# Patient Record
Sex: MALE | Race: WHITE | NOT HISPANIC OR LATINO | Employment: OTHER | URBAN - METROPOLITAN AREA
[De-identification: names, ages, dates, MRNs, and addresses within clinical notes are randomized per-mention and may not be internally consistent; named-entity substitution may affect disease eponyms.]

---

## 2017-01-31 ENCOUNTER — TRANSFERRED RECORDS (OUTPATIENT)
Dept: HEALTH INFORMATION MANAGEMENT | Facility: CLINIC | Age: 77
End: 2017-01-31

## 2017-02-01 ENCOUNTER — OFFICE VISIT (OUTPATIENT)
Dept: FAMILY MEDICINE | Facility: CLINIC | Age: 77
End: 2017-02-01
Payer: COMMERCIAL

## 2017-02-01 VITALS
BODY MASS INDEX: 23.43 KG/M2 | DIASTOLIC BLOOD PRESSURE: 74 MMHG | WEIGHT: 140.6 LBS | SYSTOLIC BLOOD PRESSURE: 130 MMHG | RESPIRATION RATE: 15 BRPM | HEIGHT: 65 IN | OXYGEN SATURATION: 97 % | HEART RATE: 65 BPM | TEMPERATURE: 97.5 F

## 2017-02-01 DIAGNOSIS — C44.310 BCC (BASAL CELL CARCINOMA), FACE: ICD-10-CM

## 2017-02-01 DIAGNOSIS — I10 HYPERTENSION GOAL BP (BLOOD PRESSURE) < 140/90: Primary | ICD-10-CM

## 2017-02-01 DIAGNOSIS — J43.9 PULMONARY EMPHYSEMA, UNSPECIFIED EMPHYSEMA TYPE (H): ICD-10-CM

## 2017-02-01 DIAGNOSIS — J42 CHRONIC BRONCHITIS, UNSPECIFIED CHRONIC BRONCHITIS TYPE (H): ICD-10-CM

## 2017-02-01 PROCEDURE — 99214 OFFICE O/P EST MOD 30 MIN: CPT | Performed by: INTERNAL MEDICINE

## 2017-02-01 NOTE — NURSING NOTE
"Chief Complaint   Patient presents with     COPD     insurance will no longer cover Advair Diskus   possbile alternative Dulera, Symbicort.       Initial /74 mmHg  Pulse 65  Temp(Src) 97.5  F (36.4  C) (Oral)  Resp 15  Ht 5' 5\" (1.651 m)  Wt 140 lb 9.6 oz (63.776 kg)  BMI 23.40 kg/m2  SpO2 97% Estimated body mass index is 23.4 kg/(m^2) as calculated from the following:    Height as of this encounter: 5' 5\" (1.651 m).    Weight as of this encounter: 140 lb 9.6 oz (63.776 kg).  BP completed using cuff size: regular    "

## 2017-02-01 NOTE — PROGRESS NOTES
"  SUBJECTIVE:                                                    Deepak Mckay is a 76 year old male who presents to clinic today for the following health issues:      HISTORY OF PRESENT ILLNESS:    COPD Follow-Up    Symptoms are currently: Stable    Current fatigue or dyspnea with ambulation: Stable       Shortness of breath: Stable    Increased or change in Cough/Sputum: No    Fever(s): No    Baseline ambulation without stopping to rest 1.5 miles. Able to walk up three flights of stairs without stopping to rest.    Any ER/UC or hospital admissions since your last visit? No     Concerns Today:             The patient presents to the clinic today to discuss alternatives to Advair because his insurance will no longer cover it. He is interested in either Dulera or Symbicort, whichever will be covered.    History   Smoking status     Former Smoker     Quit date: 01/01/1980   Smokeless tobacco     Never Used     Comment: Pipie smoker, quit 1980 max 1/2ppd cigarettes since 1955     FEV1        1.47   11/21/2013  [id number]       43   11/21/2013       Amount of exercise or physical activity: 4 days/week for an average of greater than 60 minutes    Problems taking medications regularly: No    Medication side effects: none    Diet: regular (no restrictions)    Problem list and histories reviewed & adjusted, as indicated.  Additional history: as documented    Labs reviewed in EPIC  Problem list, Medication list, Allergies, and Medical/Social/Surgical histories reviewed in [facility] and updated as appropriate.      REVIEW OF SYSTEMS:  C: NEGATIVE for fever, chills, or change in weight  I: Hx of Basal Cell Carcinoma - \"Blue Light Treatment\" at [facility]; POSITIVE for a seborrheic keratosis located on top of his head - follows with Dr. Regino Portillo at [facility]; NEGATIVE for worrisome rashes  R:  Chronic Obstructive Pulmonary Disease - use of Advair and Albuterol; no frequent exacerbations; NEGATIVE for " "significant cough or SOB  CV: Hypertension - use of Valsartan-Hydrochlorothiazide; NEGATIVE for chest pain, palpitations or peripheral edema  GI: POSITIVE for intermittent heartburn; NEGATIVE for nausea, abdominal pain, heartburn, or change in bowel habits  N: NEGATIVE for weakness, dizziness or paresthesias  P: NEGATIVE for changes in mood or affect    This document serves as a record of the services and decisions personally performed and made by Gaby Richmond MD. It was created on her behalf by Juliana Proctor, a trained medical scribe. The creation of this document is based the provider's statements to the medical scribe.  Juliana Proctor, February 1, 2017 11:45 AM     OBJECTIVE:                                                    /74 mmHg  Pulse 65  Temp(Src) 97.5  F (36.4  C) (Oral)  Resp 15  Ht 1.651 m (5' 5\")  Wt 63.776 kg (140 lb 9.6 oz)  BMI 23.40 kg/m2  SpO2 97%  Body mass index is 23.4 kg/(m^2).    EXAM:  GENERAL: Patient appears healthy, alert and not distressed.  EYES: Eyes appear grossly normal to inspection, with normal conjunctivae and sclerae  NECK: No adenopathy present, no asymmetry, masses, or scars noted, thyroid is normal to palpation  RESP: Lungs are clear to auscultation - no rales, rhonchi or wheezes present  CV: Regular rate and rhythm, normal S1 S2 heart sounds, no ectopy, no peripheral edema present, peripheral pulses normal, no carotid bruit.  ABDOMEN: Soft, nontender, no hepatosplenomegaly, no masses, normal bowel sounds  MS: No gross musculoskeletal defects noted, no edema, gait is age appropriate without ataxia  SKIN: No suspicious rashes or moles; Seborrheic keratosis noted on scalp.  NEURO: Patient exhibits normal strength and tone, normal speech and mentation  PSYCH: Mentation appears normal, affect is normal/bright    Diagnostic Test Results:  No results found for this or any previous visit (from the past 24 hour(s)).      ASSESSMENT/PLAN:                                 " "                   (C44.310) BCC (basal cell carcinoma), face    Comment: Shave biopsy performed and treated with \"Blue Light Treatment\" by Dr. Regino Portillo at [facility]; Next appointment with Dr. Portillo is 5/6/2017.   Plan: Follow-up appointment with Dr. Portillo on 5/6/2017.    (I10) Hypertension goal BP (blood pressure) < 140/90 (primary encounter diagnosis)  Comment: Stable and optimally controlled with Valsartan-Hydrochlorothiazide; Well-tolerated; No changes in medication or dosing.  Plan: Will continue to monitor.    (J42) Chronic bronchitis, unspecified chronic bronchitis type (H)  Comment: Insurance will no longer cover Advair; Formulary switch to Dulera; Symptoms are otherwise very well-controlled without the need for Albuterol rescue inhaler for over five months; Continues to keep Albuterol on hand just in case.   Plan: mometasone-formoterol (DULERA) 200-5 MCG/ACT oral inhaler            MEDICATIONS:   Orders Placed This Encounter   Medications     mometasone-formoterol (DULERA) 200-5 MCG/ACT oral inhaler     Sig: Inhale 2 puffs into the lungs 2 times daily     Dispense:  3 Inhaler     Refill:  3     Medications Discontinued During This Encounter   Medication Reason     fluticasone-salmeterol (ADVAIR) 250-50 MCG/DOSE diskus inhaler Cost/Formulary change          - Continue other medications without change      The information in this document, created by a medical scribe for me, accurately reflects the services I personally performed and the decisions made by me. I have reviewed and approved this document for accuracy.  Dr. Gaby Richmond, 11:54 AM, February 1, 2017    Gaby Richmond MD  Internal Medicine   [facility]  "

## 2017-02-01 NOTE — MR AVS SNAPSHOT
"              After Visit Summary   2017    Deepak Mckay    MRN: [id number]           Patient Information     Date Of Birth          1940        Visit Information        Provider Department      2017 11:00 AM Gaby Richmond MD [facility]        Today's Diagnoses     BCC (basal cell carcinoma), face    -  1     Hypertension goal BP (blood pressure) < 140/90         Chronic bronchitis, unspecified chronic bronchitis type (H)            Follow-ups after your visit        Who to contact     If you have questions or need follow up information about today's clinic visit or your schedule please contact [facility] directly at [phone].  Normal or non-critical lab and imaging results will be communicated to you by MyChart, letter or phone within 4 business days after the clinic has received the results. If you do not hear from us within 7 days, please contact the clinic through MyChart or phone. If you have a critical or abnormal lab result, we will notify you by phone as soon as possible.  Submit refill requests through [employer] or call your pharmacy and they will forward the refill request to us. Please allow 3 business days for your refill to be completed.          Additional Information About Your Visit        MyChart Information     [employer] lets you send messages to your doctor, view your test results, renew your prescriptions, schedule appointments and more. To sign up, go to [email or web address] . Click on \"Log in\" on the left side of the screen, which will take you to the Welcome page. Then click on \"Sign up Now\" on the right side of the page.     You will be asked to enter the access code listed below, as well as some personal information. Please follow the directions to create your username and password.     Your access code is: [email or web address]  Expires: 2017 11:53 AM     Your access code will  in 90 days. If you need help or a new code, please " "call your [facility] clinic or [phone].        Care EveryWhere ID     This is your Care EveryWhere ID. This could be used by other organizations to access your [facility] medical records  [id number]        Your Vitals Were     Pulse Temperature Respirations Height BMI (Body Mass Index) Pulse Oximetry    65 97.5  F (36.4  C) (Oral) 15 5' 5\" (1.651 m) 23.40 kg/m2 97%       Blood Pressure from Last 3 Encounters:   02/01/17 130/74   08/10/16 122/64   08/03/15 118/66    Weight from Last 3 Encounters:   02/01/17 140 lb 9.6 oz (63.776 kg)   08/10/16 144 lb 6.4 oz (65.499 kg)   08/03/15 143 lb 4.8 oz (65 kg)              Today, you had the following     No orders found for display         Today's Medication Changes          These changes are accurate as of: 2/1/17 11:53 AM.  If you have any questions, ask your nurse or doctor.               Start taking these medicines.        Dose/Directions    mometasone-formoterol 200-5 MCG/ACT oral inhaler   Commonly known as:  DULERA   Used for:  Chronic bronchitis, unspecified chronic bronchitis type (H)   Started by:  Gaby Richmond MD        Dose:  2 puff   Inhale 2 puffs into the lungs 2 times daily   Quantity:  3 Inhaler   Refills:  3         Stop taking these medicines if you haven't already. Please contact your care team if you have questions.     fluticasone-salmeterol 250-50 MCG/DOSE diskus inhaler   Commonly known as:  ADVAIR   Stopped by:  Gaby Richmond MD                Where to get your medicines      These medications were sent to [id number] [address] [address]  [address] [facility] [address]     Phone:  [phone]    - mometasone-formoterol 200-5 MCG/ACT oral inhaler             Primary Care Provider Office Phone # Fax #    Gaby Richmond [phone]       [facility] [address] [facility] [address]        Thank you!     Thank you for choosing [facility] " [facility]  for your care. Our goal is always to provide you with excellent care. Hearing back from our patients is one way we can continue to improve our services. Please take a few minutes to complete the written survey that you may receive in the mail after your visit with us. Thank you!             Your Updated Medication List - Protect others around you: Learn how to safely use, store and throw away your medicines at www.disposemymeds.org.          This list is accurate as of: 2/1/17 11:53 AM.  Always use your most recent med list.                   Brand Name Dispense Instructions for use    albuterol 108 (90 BASE) MCG/ACT Inhaler    albuterol    1 Inhaler    Inhale 2 puffs into the lungs every 4 hours as needed       mometasone-formoterol 200-5 MCG/ACT oral inhaler    DULERA    3 Inhaler    Inhale 2 puffs into the lungs 2 times daily       valsartan-hydrochlorothiazide 160-25 MG per tablet    DIOVAN HCT    90 tablet    Take 1 tablet by mouth daily Profile Rx: patient will contact pharmacy when needed       VITAMIN D (CHOLECALCIFEROL) PO      Take by mouth daily

## 2017-05-05 ENCOUNTER — TRANSFERRED RECORDS (OUTPATIENT)
Dept: HEALTH INFORMATION MANAGEMENT | Facility: CLINIC | Age: 77
End: 2017-05-05

## 2017-10-20 ENCOUNTER — TRANSFERRED RECORDS (OUTPATIENT)
Dept: HEALTH INFORMATION MANAGEMENT | Facility: CLINIC | Age: 77
End: 2017-10-20

## 2017-11-27 ENCOUNTER — OFFICE VISIT (OUTPATIENT)
Dept: FAMILY MEDICINE | Facility: CLINIC | Age: 77
End: 2017-11-27
Payer: COMMERCIAL

## 2017-11-27 VITALS
WEIGHT: 140.5 LBS | SYSTOLIC BLOOD PRESSURE: 116 MMHG | OXYGEN SATURATION: 97 % | RESPIRATION RATE: 17 BRPM | HEIGHT: 65 IN | BODY MASS INDEX: 23.41 KG/M2 | TEMPERATURE: 97.6 F | DIASTOLIC BLOOD PRESSURE: 78 MMHG | HEART RATE: 74 BPM

## 2017-11-27 DIAGNOSIS — I10 ESSENTIAL HYPERTENSION, BENIGN: ICD-10-CM

## 2017-11-27 DIAGNOSIS — J42 CHRONIC BRONCHITIS, UNSPECIFIED CHRONIC BRONCHITIS TYPE (H): ICD-10-CM

## 2017-11-27 DIAGNOSIS — Q35.9 CLEFT PALATE: ICD-10-CM

## 2017-11-27 DIAGNOSIS — Z92.89 HISTORY OF USE OF HEARING AID IN BOTH EARS: ICD-10-CM

## 2017-11-27 DIAGNOSIS — H61.23 BILATERAL IMPACTED CERUMEN: ICD-10-CM

## 2017-11-27 DIAGNOSIS — Z00.00 ROUTINE GENERAL MEDICAL EXAMINATION AT A HEALTH CARE FACILITY: Primary | ICD-10-CM

## 2017-11-27 PROCEDURE — 80061 LIPID PANEL: CPT | Performed by: INTERNAL MEDICINE

## 2017-11-27 PROCEDURE — 80053 COMPREHEN METABOLIC PANEL: CPT | Performed by: INTERNAL MEDICINE

## 2017-11-27 PROCEDURE — 82043 UR ALBUMIN QUANTITATIVE: CPT | Performed by: INTERNAL MEDICINE

## 2017-11-27 PROCEDURE — 99207 C PAF COMPLETED  NO CHARGE: CPT | Performed by: INTERNAL MEDICINE

## 2017-11-27 PROCEDURE — 99397 PER PM REEVAL EST PAT 65+ YR: CPT | Performed by: INTERNAL MEDICINE

## 2017-11-27 PROCEDURE — 99213 OFFICE O/P EST LOW 20 MIN: CPT | Mod: 25 | Performed by: INTERNAL MEDICINE

## 2017-11-27 PROCEDURE — 36415 COLL VENOUS BLD VENIPUNCTURE: CPT | Performed by: INTERNAL MEDICINE

## 2017-11-27 RX ORDER — VALSARTAN AND HYDROCHLOROTHIAZIDE 160; 25 MG/1; MG/1
1 TABLET ORAL DAILY
Qty: 90 TABLET | Refills: 3 | Status: SHIPPED | OUTPATIENT
Start: 2017-11-27 | End: 2018-12-10

## 2017-11-27 RX ORDER — ALBUTEROL SULFATE 90 UG/1
2 AEROSOL, METERED RESPIRATORY (INHALATION) EVERY 4 HOURS PRN
Qty: 1 INHALER | Refills: 1 | Status: SHIPPED | OUTPATIENT
Start: 2017-11-27 | End: 2018-12-10

## 2017-11-27 NOTE — LETTER
My COPD Action Plan     Name: Deepak Mckay    YOB: 1940   Date: 11/27/2017    My doctor: Gaby Richmond MD   My clinic: [address] [address] [phone]  My Controller Medicine: Formoterol/mometasone (Dulera)   Dose: 200/5     My Rescue Medicine: Albuterol (Proair/Ventolin/Proventil) inhaler   Dose:      My Flare Up Medicine: none   Dose:  FEV-1 (no units)   Date Value   11/21/2013 1.47     FEV1/FVC (no units)   Date Value   11/21/2013 43      My COPD Severity:       Use of Oxygen: Oxygen Not Prescribed      Make sure you've had your pneumonia   vaccines.          GREEN ZONE       Doing well today      Usual level of activity and exercise    Usual amount of cough and mucus    No shortness of breath    Usual level of health (thinking clearly, sleeping well, feel like eating) Actions:      Take daily medicines    Use oxygen as prescribed    Follow regular exercise and diet plan    Avoid cigarette smoke and other irritants that harm the lungs           YELLOW ZONE          Having a bad day or flare up      Short of breath more than usual    A lot more sputum (mucus) than usual    Sputum looks yellow, green, tan, brown or bloody    More coughing or wheezing    Fever or chills    Less energy; trouble completing activities    Trouble thinking or focusing    Using quick relief inhaler or nebulizer more often    Poor sleep; symptoms wake me up    Do not feel like eating Actions:      Get plenty of rest    Take daily medicines    Use quick relief inhaler every 6 hours    If you use oxygen, call you doctor to see if you should adjust your oxygen    Do breathing exercises or other things to help you relax    Let a loved one, friend or neighbor know you are feeling worse    Call your care team if you have 2 or more symptoms.  Start taking steroids or antibiotics if directed by your care team           RED ZONE       Need medical care  now      Severe shortness of breath (feel you can't breathe)    Fever, chills    Not enough breath to do any activity    Trouble coughing up mucus, walking or talking    Blood in mucus    Frequent coughing   Rescue medicines are not working    Not able to sleep because of breathing    Feel confused or drowsy    Chest pain    Actions:      Call your health care team.  If you cannot reach your care team, call 911 or go to the emergency room.        Electronically signed by: Claudette Bright, November 27, 2017  Annual Reminders:  Meet with Care Team, Flu Shot every Fall  Pharmacy: [facility] [address] [place] [address] [facility]

## 2017-11-27 NOTE — PROGRESS NOTES
SUBJECTIVE:   Deepak Mckay is a 77 year old male who presents for Preventive Visit.  He is also due for assessment of Chronic Bronchitis ( COPD), Hypertension and decreased hearing    Are you in the first 12 months of your Medicare Part B coverage?  No    Healthy Habits:    Do you get at least three servings of calcium containing foods daily (dairy, green leafy vegetables, etc.)? yes    Amount of exercise or daily activities, outside of work: 7 day(s) per week  Walks 2 miles and lifts weights     Problems taking medications regularly No    Medication side effects: No    Have you had an eye exam in the past two years? yes    Do you see a dentist twice per year? yes    Do you have sleep apnea, excessive snoring or daytime drowsiness?no      COGNITIVE SCREEN  1) Repeat 3 items (Banana, Sunrise, Chair)    2) Clock draw: NORMAL  3) 3 item recall: Recalls 3 objects  Results: 3 items recalled: COGNITIVE IMPAIRMENT LESS LIKELY    Mini-CogTM Copyright S Gisela. Licensed by the author for use in [facility]; reprinted with permission (ghazala@[facility]). All rights reserved.      Reviewed and updated as needed this visit by clinical staffTobacco  Allergies  Meds  Med Hx  Surg Hx  Fam Hx  Soc Hx      Reviewed and updated as needed this visit by Provider        Social History   Substance Use Topics     Smoking status: Former Smoker     Quit date: 1/1/1980     Smokeless tobacco: Never Used      Comment: Pipie smoker, quit 1980 max 1/2ppd cigarettes since 1955     Alcohol use 1.0 oz/week     2 drink(s) per week     The patient does not drink >3 drinks per day nor >7 drinks per week.     Today's PHQ-2 Score:   PHQ-2 ( 1999 Pfizer) 11/27/2017 2/1/2017   Q1: Little interest or pleasure in doing things 0 0   Q2: Feeling down, depressed or hopeless 0 0   PHQ-2 Score 0 0     Do you feel safe in your environment - Yes    Do you have a Health Care Directive?: Yes: Advance Directive has been received and  scanned.    Current providers sharing in care for this patient include: Patient Care Team:  Gaby Richmond MD as PCP - General (Internal Medicine)      Hearing impairment: Yes, wears hearing aids    Ability to successfully perform activities of daily living: Yes, no assistance needed     Fall risk:  Fallen 2 or more times in the past year?: No  Any fall with injury in the past year?: No    Home safety:  none identified    The following health maintenance items are reviewed in Epic and correct as of today:  Health Maintenance   Topic Date Due     COPD ACTION PLAN Q1 YR  1940     FALL RISK ASSESSMENT  08/10/2017     ADVANCE DIRECTIVE PLANNING Q5 YRS  09/01/2020     LIPID SCREEN Q5 YR MALE (SYSTEM ASSIGNED)  08/10/2021     TETANUS IMMUNIZATION (SYSTEM ASSIGNED)  12/15/2021     INFLUENZA VACCINE (SYSTEM ASSIGNED)  Completed     SPIROMETRY ONETIME  Completed     PNEUMOCOCCAL  Completed     Labs reviewed in EPIC    ROS:  CONSTITUTIONAL: NEGATIVE for fever, chills, change in weight  INTEGUMENTARY/SKIN: NEGATIVE for worrisome rashes, moles or lesions  EYES: NEGATIVE for vision changes or irritation  ENT/MOUTH: POSITIVE for increased hearing loss, use of bilateral hearing aids, follows with [facility] in [place]; Cleft palate; NEGATIVE for mouth and throat problems  RESP: Hx of pulmonary emphysema and COPD - use of albuterol inhaler and dulera; NEGATIVE for significant cough or SOB  CV: Hypertension - use of valsartan-HCTZ; NEGATIVE for chest pain, palpitations or peripheral edema  GI: NEGATIVE for nausea, abdominal pain, heartburn, or change in bowel habits  : Hx of BPH; negative for dysuria, hematuria, erectile dysfunction  MUSCULOSKELETAL: NEGATIVE for significant arthralgias or myalgia  NEURO: NEGATIVE for weakness, dizziness or paresthesias  ENDOCRINE: Hx of hyperlipidemia; NEGATIVE for temperature intolerance, skin/hair changes  HEME/ALLERGY/IMMUNE: NEGATIVE for bleeding problems  PSYCHIATRIC:  "NEGATIVE for changes in mood or affect    This document serves as a record of the services and decisions personally performed and made by Gaby Richmond MD. It was created on her behalf by Anitra Jeffers, a trained medical scribe. The creation of this document is based on the provider's statements to the medical scribe.   Anitra Jeffers, 10:36 AM, November 27, 2017    OBJECTIVE:   /78  Pulse 74  Temp 97.6  F (36.4  C) (Oral)  Resp 17  Ht 5' 5\" (1.651 m)  Wt 140 lb 8 oz (63.7 kg)  SpO2 97%  BMI 23.38 kg/m2 Estimated body mass index is 23.38 kg/(m^2) as calculated from the following:    Height as of this encounter: 5' 5\" (1.651 m).    Weight as of this encounter: 140 lb 8 oz (63.7 kg).     EXAM:   GENERAL: healthy, alert and no distress  EYES: Eyes grossly normal to inspection and PERRL  HENT: Mild cerumen, R>L; normal cephalic/atraumatic, ear canals and TM's normal, nose and mouth without ulcers or lesions, oropharynx clear and oral mucous membranes moist  NECK: no adenopathy, no asymmetry, masses, or scars, thyroid normal to palpation and no carotid bruits  RESP: Prolonged expiratory phase; lungs clear to auscultation - no rales, rhonchi or wheezes  CV: regular rates and rhythm, normal S1 S2, no murmur, peripheral pulses strong and no peripheral edema  ABDOMEN: soft, nontender, without hepatosplenomegaly or masses and bowel sounds normal  MS: extremities normal- no gross deformities noted  SKIN: no suspicious lesions or rashes  NEURO: Normal strength and tone, sensory exam grossly normal and mentation intact  PSYCH: mentation appears normal and affect normal/bright    ASSESSMENT / PLAN:     (Z00.00) Routine general medical examination at a health care facility  (primary encounter diagnosis)  Comment: HEALTH CARE MAINTENANCE and immunizations reviewed and up to date; Fasting for labs today  Plan: Annual preventative visit     (I10) Essential hypertension, benign  Comment: BP reviewed and " well-controlled with valsartan-HCTZ; /78 today; no change in medications/dosage at this time; will continue to monitor   Plan: valsartan-hydrochlorothiazide (DIOVAN HCT)         160-25 MG per tablet, Comprehensive metabolic         panel, Albumin Random Urine Quantitative with         Creat Ratio    (J42) Chronic bronchitis, unspecified chronic bronchitis type (H)  Comment: Formulary switch to Dulera; has Albuterol available but not needed in over 5 months; Pt is using dulera once per day and has not used albuterol since his last appt; pt reports that his COPD is gradually becoming a little more difficult; he is recommended to use dulera twice per day during the winter if things worsen; pt walks daily outside and during the winter he goes to [employer]  Plan: albuterol (PROAIR HFA) 108 (90 BASE) MCG/ACT         Inhaler, mometasone-formoterol (DULERA) 200-5         MCG/ACT oral inhaler, Lipid panel reflex to         direct LDL Fasting, COPD ACTION PLAN    (Q35.9) Cleft palate  Comment: noted  Plan: no ongoing issues     (Z92.89) History of use of hearing aid in both ears  Comment: follows with [facility]; bilateral hearing aids since ~ 2010  Plan: continue to follow with [facility]    (H61.23) Bilateral impacted cerumen  Comment: recommend Debrox in ears to soften and could return for removal or have removed when hearing aids checked next week; Mild cerumen in bilateral ears, R>L; pt notes increased hearing loss; use of bilateral hearing aids; follows with [facility] in [place]; has an appt scheduled for his hearing loss  Plan: carbamide peroxide (DEBROX) 6.5 % otic solution to help clear cerumen; could then irrigate himself at home or return for nursing staff to assist clear ears; another option is to see hearing aid folks and upconing appt      End of Life Planning:  Patient currently has an advanced directive: Yes.  Practitioner is supportive of decision.    COUNSELING:  Reviewed preventive health  "counseling, as reflected in patient instructions  Special attention given to:       Regular exercise       Healthy diet/nutrition    Estimated body mass index is 23.38 kg/(m^2) as calculated from the following:    Height as of this encounter: 5' 5\" (1.651 m).    Weight as of this encounter: 140 lb 8 oz (63.7 kg).     reports that he quit smoking about 37 years ago. He has never used smokeless tobacco.    Appropriate preventive services were discussed with this patient, including applicable screening as appropriate for cardiovascular disease, diabetes, osteopenia/osteoporosis, and glaucoma.  As appropriate for age/gender, discussed screening for colorectal cancer, prostate cancer, breast cancer, and cervical cancer. Checklist reviewing preventive services available has been given to the patient.    Reviewed patients plan of care and provided an AVS. The Basic Care Plan (routine screening as documented in Health Maintenance) for Deepak meets the Care Plan requirement. This Care Plan has been established and reviewed with the Patient.    Counseling Resources:  ATP IV Guidelines  Pooled Cohorts Equation Calculator  Breast Cancer Risk Calculator  FRAX Risk Assessment  ICSI Preventive Guidelines  Dietary Guidelines for Americans, 2010  USDA's MyPlate  ASA Prophylaxis  Lung CA Screening    Gaby Richmond MD  Internal Medicine  [facility]  15 minutes in addition to HEALTH CARE MAINTENANCE are spent with patient, over 50% of that time spent providing counselling, discussing and reviewing medical conditions/concerns, meds and potential side effects.      The information in this document, created by a medical scribe for me, accurately reflects the services I personally performed and the decisions made by me. I have reviewed and approved this document for accuracy.  Dr. Gaby Richmond, 10:55 AM, November 27, 2017    "

## 2017-11-27 NOTE — MR AVS SNAPSHOT
After Visit Summary   11/27/2017    Deepak Mckay    MRN: [id number]           Patient Information     Date Of Birth          1940        Visit Information        Provider Department      11/27/2017 10:00 AM Gaby Richmond MD [facility]        Today's Diagnoses     Routine general medical examination at a health care facility    -  1    Essential hypertension, benign        Chronic bronchitis, unspecified chronic bronchitis type (H)        Cleft palate        History of use of hearing aid in both ears        Bilateral impacted cerumen          Care Instructions      Preventive Health Recommendations:       Male Ages 65 and over    Yearly exam:             See your health care provider every year in order to  o   Review health changes.   o   Discuss preventive care.    o   Review your medicines if your doctor has prescribed any.    Talk with your health care provider about whether you should have a test to screen for prostate cancer (PSA).    Every 3 years, have a diabetes test (fasting glucose). If you are at risk for diabetes, you should have this test more often.    Every 5 years, have a cholesterol test. Have this test more often if you are at risk for high cholesterol or heart disease.     Every 10 years, have a colonoscopy. Or, have a yearly FIT test (stool test). These exams will check for colon cancer.    Talk to with your health care provider about screening for Abdominal Aortic Aneurysm if you have a family history of AAA or have a history of smoking.  Shots:     Get a flu shot each year.     Get a tetanus shot every 10 years.     Talk to your doctor about your pneumonia vaccines. There are now two you should receive - Pneumovax (PPSV 23) and Prevnar (PCV 13).    Talk to your doctor about a shingles vaccine.     Talk to your doctor about the hepatitis B vaccine.  Nutrition:     Eat at least 5 servings of fruits and vegetables each day.     Eat whole-grain bread,  "whole-wheat pasta and brown rice instead of white grains and rice.     Talk to your doctor about Calcium and Vitamin D.   Lifestyle    Exercise for at least 150 minutes a week (30 minutes a day, 5 days a week). This will help you control your weight and prevent disease.     Limit alcohol to one drink per day.     No smoking.     Wear sunscreen to prevent skin cancer.     See your dentist every six months for an exam and cleaning.     See your eye doctor every 1 to 2 years to screen for conditions such as glaucoma, macular degeneration and cataracts.          Follow-ups after your visit        Who to contact     If you have questions or need follow up information about today's clinic visit or your schedule please contact [facility] directly at [phone].  Normal or non-critical lab and imaging results will be communicated to you by [employer], letter or phone within 4 business days after the clinic has received the results. If you do not hear from us within 7 days, please contact the clinic through [employer] or phone. If you have a critical or abnormal lab result, we will notify you by phone as soon as possible.  Submit refill requests through [email or web address] or call your pharmacy and they will forward the refill request to us. Please allow 3 business days for your refill to be completed.          Additional Information About Your Visit        [email or web address] Information     [email or web address] lets you send messages to your doctor, view your test results, renew your prescriptions, schedule appointments and more. To sign up, go to [email or web address] . Click on \"Log in\" on the left side of the screen, which will take you to the Welcome page. Then click on \"Sign up Now\" on the right side of the page.     You will be asked to enter the access code listed below, as well as some personal information. Please follow the directions to create your username and password.     Your access code is: [email or web address]  Expires: 2/25/2018 10:50 " "AM     Your access code will  in 90 days. If you need help or a new code, please call your [facility] clinic or [phone].        Care EveryWhere ID     This is your Care EveryWhere ID. This could be used by other organizations to access your [facility] medical records  [id number]        Your Vitals Were     Pulse Temperature Respirations Height Pulse Oximetry BMI (Body Mass Index)    74 97.6  F (36.4  C) (Oral) 17 5' 5\" (1.651 m) 97% 23.38 kg/m2       Blood Pressure from Last 3 Encounters:   17 116/78   17 130/74   08/10/16 122/64    Weight from Last 3 Encounters:   17 140 lb 8 oz (63.7 kg)   17 140 lb 9.6 oz (63.8 kg)   08/10/16 144 lb 6.4 oz (65.5 kg)              We Performed the Following     Albumin Random Urine Quantitative with Creat Ratio     Comprehensive metabolic panel     COPD ACTION PLAN     Lipid panel reflex to direct LDL Fasting          Today's Medication Changes          These changes are accurate as of: 17 10:50 AM.  If you have any questions, ask your nurse or doctor.               Start taking these medicines.        Dose/Directions    carbamide peroxide 6.5 % otic solution   Commonly known as:  DEBROX   Used for:  Bilateral impacted cerumen   Started by:  Gaby Richmond MD        Dose:  5-10 drop   Place 5-10 drops into the right ear 2 times daily   Quantity:  30 mL   Refills:  0            Where to get your medicines      These medications were sent to [facility] [address] [place] [address] [facility]  [address] [address] [facility] [address]     Phone:  [phone]     albuterol 108 (90 BASE) MCG/ACT Inhaler    mometasone-formoterol 200-5 MCG/ACT oral inhaler    valsartan-hydrochlorothiazide 160-25 MG per tablet         Some of these will need a paper prescription and others can be bought over the counter.  Ask your nurse if you have questions.     You don't need a prescription for these medications     carbamide peroxide 6.5 % " otic solution                Primary Care Provider Office Phone # Fax #    Gaby Richmond [phone]       [address] [address] [facility] [address]        Equal Access to Services     DANIEL GOLD : Hadii aad ku hadlovelyo Soomaali, waaxda luqadaha, qaybta kaalmada adeegyada, zoey gibbonsn aviva vizcarra laLeroyclaudine huber. So [facility] [phone].    ATENCIÓN: Si habla español, tiene a harris disposición servicios gratuitos de asistencia lingüística. Llame al [phone].    We comply with applicable federal civil rights laws and [place] laws. We do not discriminate on the basis of race, color, national origin, age, disability, sex, sexual orientation, or gender identity.            Thank you!     Thank you for choosing [facility]  for your care. Our goal is always to provide you with excellent care. Hearing back from our patients is one way we can continue to improve our services. Please take a few minutes to complete the written survey that you may receive in the mail after your visit with us. Thank you!             Your Updated Medication List - Protect others around you: Learn how to safely use, store and throw away your medicines at www.disposemymeds.org.          This list is accurate as of: 11/27/17 10:50 AM.  Always use your most recent med list.                   Brand Name Dispense Instructions for use Diagnosis    albuterol 108 (90 BASE) MCG/ACT Inhaler    PROAIR HFA    1 Inhaler    Inhale 2 puffs into the lungs every 4 hours as needed    Chronic bronchitis, unspecified chronic bronchitis type (H)       carbamide peroxide 6.5 % otic solution    DEBROX    30 mL    Place 5-10 drops into the right ear 2 times daily    Bilateral impacted cerumen       mometasone-formoterol 200-5 MCG/ACT oral inhaler    DULERA    3 Inhaler    Inhale 2 puffs into the lungs 2 times daily    Chronic bronchitis, unspecified chronic bronchitis type (H)       valsartan-hydrochlorothiazide 160-25 MG per  tablet    DIOVAN HCT    90 tablet    Take 1 tablet by mouth daily Profile Rx: patient will contact pharmacy when needed    Essential hypertension, benign       VITAMIN D (CHOLECALCIFEROL) PO      Take by mouth daily

## 2017-11-27 NOTE — NURSING NOTE
"Chief Complaint   Patient presents with     Physical     pt fasting        Initial /78  Pulse 74  Temp 97.6  F (36.4  C) (Oral)  Resp 17  Ht 5' 5\" (1.651 m)  Wt 140 lb 8 oz (63.7 kg)  SpO2 97%  BMI 23.38 kg/m2 Estimated body mass index is 23.38 kg/(m^2) as calculated from the following:    Height as of this encounter: 5' 5\" (1.651 m).    Weight as of this encounter: 140 lb 8 oz (63.7 kg).  Medication Reconciliation: complete    "

## 2017-11-28 LAB
ALBUMIN SERPL-MCNC: 3.8 G/DL (ref 3.4–5)
ALP SERPL-CCNC: 60 U/L (ref 40–150)
ALT SERPL W P-5'-P-CCNC: 17 U/L (ref 0–70)
ANION GAP SERPL CALCULATED.3IONS-SCNC: 9 MMOL/L (ref 3–14)
AST SERPL W P-5'-P-CCNC: 17 U/L (ref 0–45)
BILIRUB SERPL-MCNC: 0.6 MG/DL (ref 0.2–1.3)
BUN SERPL-MCNC: 16 MG/DL (ref 7–30)
CALCIUM SERPL-MCNC: 8.9 MG/DL (ref 8.5–10.1)
CHLORIDE SERPL-SCNC: 102 MMOL/L (ref 94–109)
CHOLEST SERPL-MCNC: 192 MG/DL
CO2 SERPL-SCNC: 28 MMOL/L (ref 20–32)
CREAT SERPL-MCNC: 0.79 MG/DL (ref 0.66–1.25)
CREAT UR-MCNC: 59 MG/DL
GFR SERPL CREATININE-BSD FRML MDRD: >90 ML/MIN/1.7M2
GLUCOSE SERPL-MCNC: 110 MG/DL (ref 70–99)
HDLC SERPL-MCNC: 75 MG/DL
LDLC SERPL CALC-MCNC: 101 MG/DL
MICROALBUMIN UR-MCNC: 7 MG/L
MICROALBUMIN/CREAT UR: 12.09 MG/G CR (ref 0–17)
NONHDLC SERPL-MCNC: 117 MG/DL
POTASSIUM SERPL-SCNC: 3.7 MMOL/L (ref 3.4–5.3)
PROT SERPL-MCNC: 7.3 G/DL (ref 6.8–8.8)
SODIUM SERPL-SCNC: 139 MMOL/L (ref 133–144)
TRIGL SERPL-MCNC: 82 MG/DL

## 2017-12-04 ENCOUNTER — TRANSFERRED RECORDS (OUTPATIENT)
Dept: HEALTH INFORMATION MANAGEMENT | Facility: CLINIC | Age: 77
End: 2017-12-04

## 2018-12-10 ENCOUNTER — OFFICE VISIT (OUTPATIENT)
Dept: FAMILY MEDICINE | Facility: CLINIC | Age: 78
End: 2018-12-10
Payer: COMMERCIAL

## 2018-12-10 VITALS
SYSTOLIC BLOOD PRESSURE: 131 MMHG | DIASTOLIC BLOOD PRESSURE: 81 MMHG | HEIGHT: 65 IN | BODY MASS INDEX: 22.78 KG/M2 | HEART RATE: 72 BPM | RESPIRATION RATE: 12 BRPM | WEIGHT: 136.7 LBS | TEMPERATURE: 97 F | OXYGEN SATURATION: 97 %

## 2018-12-10 DIAGNOSIS — Z00.00 ENCOUNTER FOR ROUTINE ADULT HEALTH EXAMINATION WITHOUT ABNORMAL FINDINGS: Primary | ICD-10-CM

## 2018-12-10 DIAGNOSIS — J42 CHRONIC BRONCHITIS, UNSPECIFIED CHRONIC BRONCHITIS TYPE (H): ICD-10-CM

## 2018-12-10 DIAGNOSIS — E78.5 HYPERLIPIDEMIA LDL GOAL <130: ICD-10-CM

## 2018-12-10 DIAGNOSIS — I10 HYPERTENSION GOAL BP (BLOOD PRESSURE) < 140/90: ICD-10-CM

## 2018-12-10 LAB
ALBUMIN SERPL-MCNC: 4.2 G/DL (ref 3.4–5)
ALP SERPL-CCNC: 58 U/L (ref 40–150)
ALT SERPL W P-5'-P-CCNC: 20 U/L (ref 0–70)
ANION GAP SERPL CALCULATED.3IONS-SCNC: 10 MMOL/L (ref 3–14)
AST SERPL W P-5'-P-CCNC: 20 U/L (ref 0–45)
BILIRUB SERPL-MCNC: 0.7 MG/DL (ref 0.2–1.3)
BUN SERPL-MCNC: 18 MG/DL (ref 7–30)
CALCIUM SERPL-MCNC: 9.6 MG/DL (ref 8.5–10.1)
CHLORIDE SERPL-SCNC: 99 MMOL/L (ref 94–109)
CHOLEST SERPL-MCNC: 197 MG/DL
CO2 SERPL-SCNC: 26 MMOL/L (ref 20–32)
CREAT SERPL-MCNC: 0.75 MG/DL (ref 0.66–1.25)
CREAT UR-MCNC: 53 MG/DL
GFR SERPL CREATININE-BSD FRML MDRD: >90 ML/MIN/1.7M2
GLUCOSE SERPL-MCNC: 116 MG/DL (ref 70–99)
HDLC SERPL-MCNC: 79 MG/DL
LDLC SERPL CALC-MCNC: 107 MG/DL
MICROALBUMIN UR-MCNC: 6 MG/L
MICROALBUMIN/CREAT UR: 11.52 MG/G CR (ref 0–17)
NONHDLC SERPL-MCNC: 118 MG/DL
POTASSIUM SERPL-SCNC: 3.4 MMOL/L (ref 3.4–5.3)
PROT SERPL-MCNC: 7.7 G/DL (ref 6.8–8.8)
SODIUM SERPL-SCNC: 135 MMOL/L (ref 133–144)
TRIGL SERPL-MCNC: 56 MG/DL

## 2018-12-10 PROCEDURE — 80053 COMPREHEN METABOLIC PANEL: CPT | Performed by: PHYSICIAN ASSISTANT

## 2018-12-10 PROCEDURE — 99213 OFFICE O/P EST LOW 20 MIN: CPT | Mod: 25 | Performed by: PHYSICIAN ASSISTANT

## 2018-12-10 PROCEDURE — G0439 PPPS, SUBSEQ VISIT: HCPCS | Performed by: PHYSICIAN ASSISTANT

## 2018-12-10 PROCEDURE — 80061 LIPID PANEL: CPT | Performed by: PHYSICIAN ASSISTANT

## 2018-12-10 PROCEDURE — 36415 COLL VENOUS BLD VENIPUNCTURE: CPT | Performed by: PHYSICIAN ASSISTANT

## 2018-12-10 PROCEDURE — 82043 UR ALBUMIN QUANTITATIVE: CPT | Performed by: PHYSICIAN ASSISTANT

## 2018-12-10 RX ORDER — VALSARTAN AND HYDROCHLOROTHIAZIDE 160; 25 MG/1; MG/1
1 TABLET ORAL DAILY
Qty: 90 TABLET | Refills: 3 | Status: SHIPPED | OUTPATIENT
Start: 2018-12-10 | End: 2019-12-16

## 2018-12-10 RX ORDER — ALBUTEROL SULFATE 90 UG/1
2 AEROSOL, METERED RESPIRATORY (INHALATION) EVERY 4 HOURS PRN
Qty: 1 INHALER | Refills: 1 | Status: SHIPPED | OUTPATIENT
Start: 2018-12-10 | End: 2021-01-20

## 2018-12-10 ASSESSMENT — ENCOUNTER SYMPTOMS
CHILLS: 0
PALPITATIONS: 0
DIZZINESS: 0
FREQUENCY: 0
NAUSEA: 0
FEVER: 0
COUGH: 1
HEMATOCHEZIA: 0
ABDOMINAL PAIN: 0
DIARRHEA: 0
SORE THROAT: 0
CONSTIPATION: 0
JOINT SWELLING: 0
NERVOUS/ANXIOUS: 0
HEARTBURN: 0
EYE PAIN: 0
HEADACHES: 0
HEMATURIA: 0

## 2018-12-10 ASSESSMENT — ACTIVITIES OF DAILY LIVING (ADL): CURRENT_FUNCTION: NO ASSISTANCE NEEDED

## 2018-12-10 ASSESSMENT — MIFFLIN-ST. JEOR: SCORE: 1249.01

## 2018-12-10 NOTE — LETTER
My COPD Action Plan     Name: Deepak Mckay    YOB: 1940   Date: 12/10/2018    My doctor: Davis Garcia PA-C   My clinic: [address] [address] [phone]  My Controller Medicine: Formoterol/mometasone (Dulera)   Dose: 2 puffs bid     My Rescue Medicine: Albuterol (Proair/Ventolin/Proventil) inhaler   Dose: as needed     My Flare Up Medicine: none   Dose: none   FEV-1 (no units)   Date Value   11/21/2013 1.47     FEV1/FVC (no units)   Date Value   11/21/2013 43      My COPD Severity:       Use of Oxygen: Oxygen Not Prescribed      Make sure you've had your pneumonia   vaccines.          GREEN ZONE       Doing well today      Usual level of activity and exercise    Usual amount of cough and mucus    No shortness of breath    Usual level of health (thinking clearly, sleeping well, feel like eating) Actions:      Take daily medicines    Use oxygen as prescribed    Follow regular exercise and diet plan    Avoid cigarette smoke and other irritants that harm the lungs           YELLOW ZONE          Having a bad day or flare up      Short of breath more than usual    A lot more sputum (mucus) than usual    Sputum looks yellow, green, tan, brown or bloody    More coughing or wheezing    Fever or chills    Less energy; trouble completing activities    Trouble thinking or focusing    Using quick relief inhaler or nebulizer more often    Poor sleep; symptoms wake me up    Do not feel like eating Actions:      Get plenty of rest    Take daily medicines    Use quick relief inhaler every 4 hours    If you use oxygen, call you doctor to see if you should adjust your oxygen    Do breathing exercises or other things to help you relax    Let a loved one, friend or neighbor know you are feeling worse    Call your care team if you have 2 or more symptoms.  Start taking steroids or antibiotics if directed by your care team           RED ZONE       Need  medical care now      Severe shortness of breath (feel you can't breathe)    Fever, chills    Not enough breath to do any activity    Trouble coughing up mucus, walking or talking    Blood in mucus    Frequent coughing   Rescue medicines are not working    Not able to sleep because of breathing    Feel confused or drowsy    Chest pain    Actions:      Call your health care team.  If you cannot reach your care team, call 911 or go to the emergency room.        Annual Reminders:  Meet with Care Team, Flu Shot every Fall  [facility] [address] [id number] [address]

## 2018-12-10 NOTE — PATIENT INSTRUCTIONS
Preventive Health Recommendations:     See your health care provider every year to    Review health changes.     Discuss preventive care.      Review your medicines if your doctor has prescribed any.    Talk with your health care provider about whether you should have a test to screen for prostate cancer (PSA).    Every 3 years, have a diabetes test (fasting glucose). If you are at risk for diabetes, you should have this test more often.    Every 5 years, have a cholesterol test. Have this test more often if you are at risk for high cholesterol or heart disease.     Every 10 years, have a colonoscopy. Or, have a yearly FIT test (stool test). These exams will check for colon cancer.    Talk to with your health care provider about screening for Abdominal Aortic Aneurysm if you have a family history of AAA or have a history of smoking.  Shots:     Get a flu shot each year.     Get a tetanus shot every 10 years.     Talk to your doctor about your pneumonia vaccines. There are now two you should receive - Pneumovax (PPSV 23) and Prevnar (PCV 13).    Talk to your pharmacist about a shingles vaccine.     Talk to your doctor about the hepatitis B vaccine.  Nutrition:     Eat at least 5 servings of fruits and vegetables each day.     Eat whole-grain bread, whole-wheat pasta and brown rice instead of white grains and rice.     Get adequate Calcium and Vitamin D.   Lifestyle    Exercise for at least 150 minutes a week (30 minutes a day, 5 days a week). This will help you control your weight and prevent disease.     Limit alcohol to one drink per day.     No smoking.     Wear sunscreen to prevent skin cancer.     See your dentist every six months for an exam and cleaning.     See your eye doctor every 1 to 2 years to screen for conditions such as glaucoma, macular degeneration and cataracts.    Personalized Prevention Plan  You are due for the preventive services outlined below.  Your care team is available to assist you in  scheduling these services.  If you have already completed any of these items, please share that information with your care team to update in your medical record.    Health Maintenance Due   Topic Date Due     Zoster (Chicken Pox) Vaccine (1 of 2) 08/26/1988     Flu Vaccine (1) 09/01/2018     COPD ACTION PLAN Q1 YR  11/27/2018     FALL RISK ASSESSMENT  11/27/2018

## 2018-12-10 NOTE — PROGRESS NOTES
"SUBJECTIVE:   Deepak Mckay is a 78 year old male who presents for Preventive Visit.  Are you in the first 12 months of your Medicare coverage?  No    Annual Wellness Visit     In general, how would you rate your overall health?  Good    Frequency of exercise:  4-5 days/week    Do you usually eat at least 4 servings of fruit and vegetables a day, include whole grains    & fiber and avoid regularly eating high fat or \"junk\" foods?  Yes    Taking medications regularly:  Yes    Medication side effects:  None    Ability to successfully perform activities of daily living:  No assistance needed    Home Safety:  No safety concerns identified    Hearing Impairment:  Difficulty following a conversation in a noisy restaurant or crowded room, feel that people are mumbling or not speaking clearly, difficulty following dialogue in the theater, difficult to understand a speaker at a public meeting or [employer] service, need to ask people to speak up or repeat themselves, find that men's voices are easier to understand than woman's, difficulty understanding soft or whispered speech and difficulty understanding speech on the telephone    In the past 6 months, have you been bothered by leaking of urine?  No    In general, how would you rate your overall mental or emotional health?  Excellent    PHQ-2 Total Score: 0    Additional concerns today:  No    Patient is an 79yo male presenting for annual physical  Overall he feels well; using dulera once daily; rarely needing rescue  He continues to have adequate control of BP; tolerating medications      Do you feel safe in your environment? Yes    Do you have a Health Care Directive? Yes: Advance Directive has been received and scanned.    Fall risk  Fallen 2 or more times in the past year?: No  Any fall with injury in the past year?: No  Cognitive Screening    1) Repeat 3 items (Leader, Season, Table)    2) Clock draw: NORMAL  3) 3 item recall: Recalls 3 objects  Results: 3 items " recalled: COGNITIVE IMPAIRMENT LESS LIKELY    Mini-CogTM Copyright S Gisela. Licensed by the author for use in [facility]; reprinted with permission (ghazala@.[facility]). All rights reserved.      Do you have sleep apnea, excessive snoring or daytime drowsiness?: no    Reviewed and updated as needed this visit by clinical staff  Tobacco  Allergies  Meds  Med Hx  Surg Hx  Fam Hx  Soc Hx        Reviewed and updated as needed this visit by Provider        Social History     Tobacco Use     Smoking status: Former Smoker     Last attempt to quit: 1980     Years since quittin.9     Smokeless tobacco: Never Used     Tobacco comment: Pipie smoker, quit  max 1/2ppd cigarettes since    Substance Use Topics     Alcohol use: Yes     Alcohol/week: 1.0 oz     Types: 2 drink(s) per week       Alcohol Use 12/10/2018   If you drink alcohol do you typically have greater than 3 drinks per day OR greater than 7 drinks per week? No       Current providers sharing in care for this patient include:   Patient Care Team:  Gaby Richmond MD as PCP - General (Internal Medicine)    The following health maintenance items are reviewed in Epic and correct as of today:  Health Maintenance   Topic Date Due     ZOSTER IMMUNIZATION (1 of 2) 1988     INFLUENZA VACCINE (1) 2018     COPD ACTION PLAN Q1 YR  2018     FALL RISK ASSESSMENT  2018     PHQ-2 Q1 YR  12/10/2019     ADVANCE DIRECTIVE PLANNING Q5 YRS  2020     DTAP/TDAP/TD IMMUNIZATION (4 - Td) 12/15/2021     LIPID SCREEN Q5 YR MALE (SYSTEM ASSIGNED)  2022     SPIROMETRY ONETIME  Completed     PNEUMOVAX IMMUNIZATION 65+ HIGH/HIGHEST RISK  Completed     IPV IMMUNIZATION  Aged Out     MENINGITIS IMMUNIZATION  Aged Out     Labs reviewed in EPIC  Pneumonia Vaccine: Up to date  Shingrix: planning to get    Review of Systems   Constitutional: Negative for chills and fever.   HENT: Positive for congestion and hearing loss (he  "continues with outside audiology; using hearing aids). Negative for ear pain and sore throat.    Eyes: Negative for pain.   Respiratory: Positive for cough (attributes this to chronic lung disease).    Cardiovascular: Negative for chest pain and palpitations.   Gastrointestinal: Negative for abdominal pain, constipation, diarrhea, heartburn, hematochezia and nausea.   Genitourinary: Negative for frequency, genital sores and hematuria.   Musculoskeletal: Negative for joint swelling.   Skin: Negative for rash.   Neurological: Negative for dizziness and headaches.   Psychiatric/Behavioral: Negative for mood changes. The patient is not nervous/anxious.        OBJECTIVE:   /81   Pulse 72   Temp 97  F (36.1  C) (Tympanic)   Resp 12   Ht 1.638 m (5' 4.5\")   Wt 62 kg (136 lb 11.2 oz)   SpO2 97%   BMI 23.10 kg/m   Estimated body mass index is 23.1 kg/m  as calculated from the following:    Height as of this encounter: 1.638 m (5' 4.5\").    Weight as of this encounter: 62 kg (136 lb 11.2 oz).  Physical Exam  GENERAL: healthy, alert and no distress  EYES: Eyes grossly normal to inspection, PERRL and conjunctivae and sclerae normal  HENT: ear canals and TM's normal, nose and mouth without ulcers or lesions  NECK: no adenopathy, no asymmetry, masses, or scars and thyroid normal to palpation  RESP: lungs clear to auscultation - no rales, rhonchi or wheezes  CV: regular rate and rhythm, normal S1 S2, no S3 or S4, no murmur, click or rub, no peripheral edema and peripheral pulses strong  ABDOMEN: soft, nontender, no hepatosplenomegaly, no masses and bowel sounds normal  MS: no gross musculoskeletal defects noted, no edema  SKIN: numerous seb k and actinic k to the scalp. Sees dermatology  PSYCH: mentation appears normal, affect normal/bright    Diagnostic Test Results:  See A/P    ASSESSMENT / PLAN:   1. Encounter for routine adult health examination without abnormal findings  Updated COPD plan. He has rec'd flu " "vaccine. Will update shingles today (was on wait list at [facility], will go to our pharmacy).    2. Hypertension goal BP (blood pressure) < 140/90  Well controlled. Continue present management.   - Comprehensive metabolic panel  - Albumin Random Urine Quantitative with Creat Ratio  - valsartan-hydrochlorothiazide (DIOVAN HCT) 160-25 MG tablet; Take 1 tablet by mouth daily Profile Rx: patient will contact pharmacy when needed  Dispense: 90 tablet; Refill: 3      3. Hyperlipidemia LDL goal <130  Rechecking. This has been managed with diet/exercise  - Lipid panel reflex to direct LDL Fasting    4. Chronic bronchitis, unspecified chronic bronchitis type (H)  Controlled. He often uses dulera even just 2 puffs once daily. Reviewed usage  - mometasone-formoterol (DULERA) 200-5 MCG/ACT inhaler; Inhale 2 puffs into the lungs 2 times daily  Dispense: 3 Inhaler; Refill: 3  - albuterol (PROAIR HFA) 108 (90 Base) MCG/ACT inhaler; Inhale 2 puffs into the lungs every 4 hours as needed for shortness of breath / dyspnea or wheezing  Dispense: 1 Inhaler; Refill: 1    End of Life Planning:  Patient currently has an advanced directive: Yes.  Practitioner is supportive of decision.    COUNSELING:  Reviewed preventive health counseling, as reflected in patient instructions    BP Readings from Last 1 Encounters:   12/10/18 131/81     Estimated body mass index is 23.1 kg/m  as calculated from the following:    Height as of this encounter: 1.638 m (5' 4.5\").    Weight as of this encounter: 62 kg (136 lb 11.2 oz).           reports that he quit smoking about 38 years ago. he has never used smokeless tobacco.      Appropriate preventive services were discussed with this patient, including applicable screening as appropriate for cardiovascular disease, diabetes, osteopenia/osteoporosis, and glaucoma.  As appropriate for age/gender, discussed screening for colorectal cancer, prostate cancer, breast cancer, and cervical cancer. Checklist reviewing " preventive services available has been given to the patient.    Reviewed patients plan of care and provided an AVS. The Basic Care Plan (routine screening as documented in Health Maintenance) for Deepak meets the Care Plan requirement. This Care Plan has been established and reviewed with the Patient.    Counseling Resources:  ATP IV Guidelines  Pooled Cohorts Equation Calculator  Breast Cancer Risk Calculator  FRAX Risk Assessment  ICSI Preventive Guidelines  Dietary Guidelines for Americans, 2010  USDA's MyPlate  ASA Prophylaxis  Lung CA Screening    Davis Garcia PA-C  [facility]

## 2018-12-10 NOTE — LETTER
December 11, 2018      Deepak Mckay  [address] [place] [address]          Misha Sotelo,    Very nice meeting you yesterday.  All of the labs are now returned and overall look pretty good.    The urine test, checking for protein, shows a normal level that is healthy.    The cholesterol test remains excellently controlled. Diet and exercise are certainly helping this one!    Finally, while the screening of the liver enzymes, kidney and electrolytes in the blood looked good, the sugar level was pretty high.  Getting close to a diabetic level. I would like to recheck this in about one month - I will put in some lab orders that I would l like you to complete.  All you have to do is schedule a lab only visit in about 1-2 months. I do recommend being fasting for these.    Please let me know any questions in the meantime and for now, try to go a bit easy on the carbs and sugars and keep active.      Toro Garcia PA-C

## 2018-12-11 DIAGNOSIS — R73.9 ELEVATED BLOOD SUGAR LEVEL: Primary | ICD-10-CM

## 2019-06-07 ENCOUNTER — TRANSFERRED RECORDS (OUTPATIENT)
Dept: HEALTH INFORMATION MANAGEMENT | Facility: CLINIC | Age: 79
End: 2019-06-07

## 2019-06-10 DIAGNOSIS — R73.9 ELEVATED BLOOD SUGAR LEVEL: ICD-10-CM

## 2019-06-10 LAB
ANION GAP SERPL CALCULATED.3IONS-SCNC: 7 MMOL/L (ref 3–14)
BUN SERPL-MCNC: 26 MG/DL (ref 7–30)
CALCIUM SERPL-MCNC: 8.9 MG/DL (ref 8.5–10.1)
CHLORIDE SERPL-SCNC: 102 MMOL/L (ref 94–109)
CO2 SERPL-SCNC: 29 MMOL/L (ref 20–32)
CREAT SERPL-MCNC: 0.92 MG/DL (ref 0.66–1.25)
GFR SERPL CREATININE-BSD FRML MDRD: 78 ML/MIN/{1.73_M2}
GLUCOSE SERPL-MCNC: 103 MG/DL (ref 70–99)
HBA1C MFR BLD: 5.6 % (ref 0–5.6)
POTASSIUM SERPL-SCNC: 3.7 MMOL/L (ref 3.4–5.3)
SODIUM SERPL-SCNC: 138 MMOL/L (ref 133–144)

## 2019-06-10 PROCEDURE — 36415 COLL VENOUS BLD VENIPUNCTURE: CPT | Performed by: PHYSICIAN ASSISTANT

## 2019-06-10 PROCEDURE — 80048 BASIC METABOLIC PNL TOTAL CA: CPT | Performed by: PHYSICIAN ASSISTANT

## 2019-06-10 PROCEDURE — 83036 HEMOGLOBIN GLYCOSYLATED A1C: CPT | Performed by: PHYSICIAN ASSISTANT

## 2019-10-22 ENCOUNTER — TELEPHONE (OUTPATIENT)
Dept: FAMILY MEDICINE | Facility: CLINIC | Age: 79
End: 2019-10-22

## 2019-10-22 DIAGNOSIS — E78.5 HYPERLIPIDEMIA LDL GOAL <130: Primary | ICD-10-CM

## 2019-10-22 DIAGNOSIS — I10 HYPERTENSION GOAL BP (BLOOD PRESSURE) < 140/90: ICD-10-CM

## 2019-10-22 NOTE — TELEPHONE ENCOUNTER
Palma Sotelo would like to do his labs prior to his Phy 12/16/19. Please call him and let them know when they are in so that he can schedule his labs and if this doesn't work out please let him know that as well.

## 2019-12-02 ENCOUNTER — TRANSFERRED RECORDS (OUTPATIENT)
Dept: HEALTH INFORMATION MANAGEMENT | Facility: CLINIC | Age: 79
End: 2019-12-02

## 2019-12-11 DIAGNOSIS — E78.5 HYPERLIPIDEMIA LDL GOAL <130: ICD-10-CM

## 2019-12-11 DIAGNOSIS — I10 HYPERTENSION GOAL BP (BLOOD PRESSURE) < 140/90: ICD-10-CM

## 2019-12-11 PROCEDURE — 80061 LIPID PANEL: CPT | Performed by: INTERNAL MEDICINE

## 2019-12-11 PROCEDURE — 82043 UR ALBUMIN QUANTITATIVE: CPT | Performed by: INTERNAL MEDICINE

## 2019-12-11 PROCEDURE — 80053 COMPREHEN METABOLIC PANEL: CPT | Performed by: INTERNAL MEDICINE

## 2019-12-11 PROCEDURE — 36415 COLL VENOUS BLD VENIPUNCTURE: CPT | Performed by: INTERNAL MEDICINE

## 2019-12-12 LAB
ALBUMIN SERPL-MCNC: 3.9 G/DL (ref 3.4–5)
ALP SERPL-CCNC: 56 U/L (ref 40–150)
ALT SERPL W P-5'-P-CCNC: 18 U/L (ref 0–70)
ANION GAP SERPL CALCULATED.3IONS-SCNC: 8 MMOL/L (ref 3–14)
AST SERPL W P-5'-P-CCNC: 18 U/L (ref 0–45)
BILIRUB SERPL-MCNC: 0.7 MG/DL (ref 0.2–1.3)
BUN SERPL-MCNC: 19 MG/DL (ref 7–30)
CALCIUM SERPL-MCNC: 9.2 MG/DL (ref 8.5–10.1)
CHLORIDE SERPL-SCNC: 102 MMOL/L (ref 94–109)
CHOLEST SERPL-MCNC: 180 MG/DL
CO2 SERPL-SCNC: 28 MMOL/L (ref 20–32)
CREAT SERPL-MCNC: 0.8 MG/DL (ref 0.66–1.25)
GFR SERPL CREATININE-BSD FRML MDRD: 84 ML/MIN/{1.73_M2}
GLUCOSE SERPL-MCNC: 100 MG/DL (ref 70–99)
HDLC SERPL-MCNC: 60 MG/DL
LDLC SERPL CALC-MCNC: 106 MG/DL
NONHDLC SERPL-MCNC: 120 MG/DL
POTASSIUM SERPL-SCNC: 3.7 MMOL/L (ref 3.4–5.3)
PROT SERPL-MCNC: 7.2 G/DL (ref 6.8–8.8)
SODIUM SERPL-SCNC: 138 MMOL/L (ref 133–144)
TRIGL SERPL-MCNC: 70 MG/DL

## 2019-12-13 LAB
CREAT UR-MCNC: 58 MG/DL
MICROALBUMIN UR-MCNC: 6 MG/L
MICROALBUMIN/CREAT UR: 9.69 MG/G CR (ref 0–17)

## 2019-12-16 ENCOUNTER — OFFICE VISIT (OUTPATIENT)
Dept: FAMILY MEDICINE | Facility: CLINIC | Age: 79
End: 2019-12-16
Payer: COMMERCIAL

## 2019-12-16 VITALS
WEIGHT: 138.2 LBS | DIASTOLIC BLOOD PRESSURE: 66 MMHG | BODY MASS INDEX: 23.03 KG/M2 | SYSTOLIC BLOOD PRESSURE: 112 MMHG | HEIGHT: 65 IN | RESPIRATION RATE: 17 BRPM | HEART RATE: 67 BPM | TEMPERATURE: 97.5 F | OXYGEN SATURATION: 97 %

## 2019-12-16 DIAGNOSIS — Z79.899 ENCOUNTER FOR MONITORING DIURETIC THERAPY: ICD-10-CM

## 2019-12-16 DIAGNOSIS — I10 HYPERTENSION GOAL BP (BLOOD PRESSURE) < 140/90: ICD-10-CM

## 2019-12-16 DIAGNOSIS — Z51.81 ENCOUNTER FOR MONITORING DIURETIC THERAPY: ICD-10-CM

## 2019-12-16 DIAGNOSIS — Z00.00 ENCOUNTER FOR MEDICARE ANNUAL WELLNESS EXAM: Primary | ICD-10-CM

## 2019-12-16 DIAGNOSIS — E78.5 HYPERLIPIDEMIA LDL GOAL <130: ICD-10-CM

## 2019-12-16 DIAGNOSIS — Q35.9 CLEFT PALATE: ICD-10-CM

## 2019-12-16 DIAGNOSIS — Z92.89 HISTORY OF USE OF HEARING AID IN BOTH EARS: ICD-10-CM

## 2019-12-16 DIAGNOSIS — J42 CHRONIC BRONCHITIS, UNSPECIFIED CHRONIC BRONCHITIS TYPE (H): ICD-10-CM

## 2019-12-16 PROCEDURE — 99213 OFFICE O/P EST LOW 20 MIN: CPT | Mod: 25 | Performed by: INTERNAL MEDICINE

## 2019-12-16 PROCEDURE — 99397 PER PM REEVAL EST PAT 65+ YR: CPT | Performed by: INTERNAL MEDICINE

## 2019-12-16 RX ORDER — VALSARTAN AND HYDROCHLOROTHIAZIDE 160; 25 MG/1; MG/1
1 TABLET ORAL DAILY
Qty: 90 TABLET | Refills: 3 | Status: SHIPPED | OUTPATIENT
Start: 2019-12-16 | End: 2021-01-20

## 2019-12-16 ASSESSMENT — ENCOUNTER SYMPTOMS
NERVOUS/ANXIOUS: 0
DIZZINESS: 0
CONSTIPATION: 0
EYE PAIN: 0
HEMATURIA: 0
COUGH: 0
ABDOMINAL PAIN: 0
CHILLS: 0
DIARRHEA: 0
HEMATOCHEZIA: 0
FEVER: 0
FREQUENCY: 1

## 2019-12-16 ASSESSMENT — ACTIVITIES OF DAILY LIVING (ADL): CURRENT_FUNCTION: NO ASSISTANCE NEEDED

## 2019-12-16 ASSESSMENT — MIFFLIN-ST. JEOR: SCORE: 1254.78

## 2019-12-16 NOTE — PROGRESS NOTES
"SUBJECTIVE:   Deepak Mckay is a 79 year old male who presents for Preventive Visit.    Discussed additional charges that may incur if addressing non physical related concerns.   Pt agreeable.  TISH Bright LPN    Are you in the first 12 months of your Medicare coverage?  No    Healthy Habits:     In general, how would you rate your overall health?  Good    Frequency of exercise:  4-5 days/week    Duration of exercise:  30-45 minutes    Do you usually eat at least 4 servings of fruit and vegetables a day, include whole grains    & fiber and avoid regularly eating high fat or \"junk\" foods?  Yes    Taking medications regularly:  Yes    Medication side effects:  None    Ability to successfully perform activities of daily living:  No assistance needed    Home Safety:  No safety concerns identified    Hearing Impairment:  Difficulty following a conversation in a noisy restaurant or crowded room, feel that people are mumbling or not speaking clearly, difficulty following dialogue in the theater, difficult to understand a speaker at a public meeting or Mu-ism service, need to ask people to speak up or repeat themselves, find that men's voices are easier to understand than woman's, difficulty understanding soft or whispered speech and difficulty understanding speech on the telephone    In the past 6 months, have you been bothered by leaking of urine?  No    In general, how would you rate your overall mental or emotional health?  Excellent      PHQ-2 Total Score: 0    Additional concerns today:  No    Do you feel safe in your environment? Yes    Have you ever done Advance Care Planning? (For example, a Health Directive, POLST, or a discussion with a medical provider or your loved ones about your wishes): Yes, advance care planning is on file.      Fall risk  Fallen 2 or more times in the past year?: No  Any fall with injury in the past year?: No    Cognitive Screening   1) Repeat 3 items (Leader, Season, Table)    2) " Clock draw: NORMAL  3) 3 item recall: Recalls 3 objects  Results: 3 items recalled: COGNITIVE IMPAIRMENT LESS LIKELY    Mini-CogTM Copyright S Gisela. Licensed by the author for use in [facility]; reprinted with permission (ghazala@[email or web address]). All rights reserved.      Do you have sleep apnea, excessive snoring or daytime drowsiness?: no    Reviewed and updated as needed this visit by clinical staff  Tobacco  Allergies  Meds  Med Hx  Surg Hx  Fam Hx  Soc Hx        Reviewed and updated as needed this visit by Provider  Tobacco  Allergies  Meds  Problems  Med Hx  Surg Hx  Fam Hx        Social History     Tobacco Use     Smoking status: Former Smoker     Last attempt to quit: 1980     Years since quittin.9     Smokeless tobacco: Never Used     Tobacco comment: Pipie smoker, quit  max 2ppd cigarettes since    Substance Use Topics     Alcohol use: Yes     Alcohol/week: 1.7 standard drinks     Types: 2 drink(s) per week     If you drink alcohol do you typically have >3 drinks per day or >7 drinks per week? No    Alcohol Use 2019   Prescreen: >3 drinks/day or >7 drinks/week? No   Prescreen: >3 drinks/day or >7 drinks/week? -           Hypertension Follow-up      Do you check your blood pressure regularly outside of the clinic? No     Are you following a low salt diet? Yes    Are your blood pressures ever more than 140 on the top number (systolic) OR more   than 90 on the bottom number (diastolic), for example 140/90? No    COPD Follow-Up    Overall, how are your COPD symptoms since your last clinic visit?  Slightly worse    How much fatigue or shortness of breath do you have when you are walking?  Same as usual    How much shortness of breath do you have when you are resting?  None    How often do you cough? Often    Have you noticed any change in your sputum/phlegm?  No    Have you experienced a recent fever? No    Please describe how far you can walk without stopping to rest:   Greater than 2 miles    How many flights of stairs are you able to walk up without stopping?  2    Have you had any Emergency Room Visits, Urgent Care Visits, or Hospital Admissions because of your COPD since your last office visit?  No    History   Smoking Status     Former Smoker     Quit date: 1/1/1980   Smokeless Tobacco     Never Used     Comment: Pipie smoker, quit 1980 max 1/2ppd cigarettes since 1955     Lab Results   Component Value Date    FEV1 1.47 11/21/2013    XBK0UZK 43 11/21/2013         Current providers sharing in care for this patient include:   Patient Care Team:  Gaby Richmond MD as PCP - General (Internal Medicine)  Davis Garcia PA-C as Assigned PCP    The following health maintenance items are reviewed in Epic and correct as of today:  Health Maintenance   Topic Date Due     FALL RISK ASSESSMENT  12/10/2019     MEDICARE ANNUAL WELLNESS VISIT  12/10/2019     ADVANCE CARE PLANNING  09/01/2020     DTAP/TDAP/TD IMMUNIZATION (4 - Td) 12/15/2021     COPD ACTION PLAN  Completed     PHQ-2  Completed     INFLUENZA VACCINE  Completed     PNEUMOCOCCAL IMMUNIZATION 65+ LOW/MEDIUM RISK  Completed     ZOSTER IMMUNIZATION  Completed     SPIROMETRY  Addressed     IPV IMMUNIZATION  Aged Out     MENINGITIS IMMUNIZATION  Aged Out     Labs reviewed in EPIC  BP Readings from Last 3 Encounters:   12/16/19 112/66   12/10/18 131/81   11/27/17 116/78    Wt Readings from Last 3 Encounters:   12/16/19 62.7 kg (138 lb 3.2 oz)   12/10/18 62 kg (136 lb 11.2 oz)   11/27/17 63.7 kg (140 lb 8 oz)                  Patient Active Problem List   Diagnosis     Other psoriasis     Cataract     Hyperlipidemia LDL goal <130     Pulmonary emphysema (H)     Cleft palate     Hypertension goal BP (blood pressure) < 140/90     Health Care Home     Advance Care Planning     BPH (benign prostatic hyperplasia)     COPD (chronic obstructive pulmonary disease) (H)     History of use of hearing aid in both ears     Past  Surgical History:   Procedure Laterality Date     C NONSPECIFIC PROCEDURE      cervical disk 4th plating     COLONOSCOPY N/A 2014    Procedure: COLONOSCOPY;  Surgeon: Clay Henley MD;  Location: [facility]     EYE SURGERY      catarct removal      HC COLONOSCOPY THRU STOMA, DIAGNOSTIC  ;     benign polyps in      HERNIA REPAIR           INSERT APPLIANCE CLEFT PALATE      multiple revisions.        Social History     Tobacco Use     Smoking status: Former Smoker     Last attempt to quit: 1980     Years since quittin.9     Smokeless tobacco: Never Used     Tobacco comment: Pipie smoker, quit  max 1/2ppd cigarettes since    Substance Use Topics     Alcohol use: Yes     Alcohol/week: 1.7 standard drinks     Types: 2 drink(s) per week     Family History   Problem Relation Age of Onset     C.A.D. Mother         CABG     Diabetes Maternal Uncle      C.A.D. Maternal Uncle         MI     Hypertension Maternal Uncle      Cancer - colorectal No family hx of          Current Outpatient Medications   Medication Sig Dispense Refill     albuterol (PROAIR HFA) 108 (90 Base) MCG/ACT inhaler Inhale 2 puffs into the lungs every 4 hours as needed for shortness of breath / dyspnea or wheezing 1 Inhaler 1     carbamide peroxide (DEBROX) 6.5 % otic solution Place 5-10 drops into the right ear 2 times daily 30 mL 0     mometasone-formoterol (DULERA) 200-5 MCG/ACT inhaler Inhale 2 puffs into the lungs 2 times daily 3 Inhaler 3     valsartan-hydrochlorothiazide (DIOVAN HCT) 160-25 MG tablet Take 1 tablet by mouth daily Profile Rx: patient will contact pharmacy when needed 90 tablet 3     Allergies   Allergen Reactions     No Known Allergies      Recent Labs   Lab Test 19  0803 06/10/19  0824 12/10/18  0939 17  1058 08/10/16  0929 08/03/15  0919  12/15/11  1432   A1C  --  5.6  --   --  5.8 5.7   < >  --    *  --  107* 101* 125* 93   < > 122   HDL 60  --  79 75 75 75   < > 79  "  TRIG 70  --  56 82 108 95   < > 65   ALT 18  --  20 17 17 21   < > 16   CR 0.80 0.92 0.75 0.79 0.78 0.79   < > 0.90   GFRESTIMATED 84 78 >90 >90 >90  Non  GFR Calc   >90  Non  GFR Calc     < > 83   GFRESTBLACK >90 90 >90 >90 >90   GFR Calc   >90   GFR Calc     < > >90   POTASSIUM 3.7 3.7 3.4 3.7 3.7 3.6   < > 3.6   TSH  --   --   --   --   --   --   --  0.62    < > = values in this interval not displayed.        Review of Systems   Constitutional: Negative for chills and fever.   HENT: Negative for congestion and ear pain.    Eyes: Negative for pain.   Respiratory: Negative for cough.    Cardiovascular: Negative for chest pain.   Gastrointestinal: Negative for abdominal pain, constipation, diarrhea and hematochezia.   Genitourinary: Positive for frequency (try to lessen intake after 4 PM). Negative for hematuria.   Neurological: Negative for dizziness.   Psychiatric/Behavioral: The patient is not nervous/anxious.          OBJECTIVE:   /66   Pulse 67   Temp 97.5  F (36.4  C) (Oral)   Resp 17   Ht 1.645 m (5' 4.75\")   Wt 62.7 kg (138 lb 3.2 oz)   SpO2 97%   BMI 23.18 kg/m   Estimated body mass index is 23.18 kg/m  as calculated from the following:    Height as of this encounter: 1.645 m (5' 4.75\").    Weight as of this encounter: 62.7 kg (138 lb 3.2 oz).  Physical Exam  GENERAL: healthy, alert and no distress  EYES: Eyes grossly normal to inspection  HENT: ear canals and TM's normal, nose and mouth without ulcers or lesions, oropharynx clear, oral mucous membranes moist and past cleft palate scars well healed.   NECK: no adenopathy, no asymmetry, masses, or scars and thyroid normal to palpation  RESP: lungs clear to auscultation - no rales, rhonchi or wheezes  CV: regular rates and rhythm, normal S1 S2, no S3 or S4, peripheral pulses strong and no peripheral edema  ABDOMEN: soft, nontender, no hepatosplenomegaly, no masses and bowel sounds " "normal  MS: no gross musculoskeletal defects noted, no edema  NEURO: Normal strength and tone, sensory exam grossly normal, mentation intact, gait normal including heel/toe/tandem walking and Romberg normal  PSYCH: mentation appears normal, affect normal/bright    Diagnostic Test Results:  Labs reviewed in Epic    ASSESSMENT / PLAN:   (Z00.00) Encounter for Medicare annual wellness exam  (primary encounter diagnosis)  Comment: HEALTH CARE MAINTENANCE reviewed  Plan:     (J42) Chronic bronchitis, unspecified chronic bronchitis type (H)  Comment: Formulary switched to Dulera few years back; well tolerated and effective; has Albuterol available but not needed in over 12 months  Plan: mometasone-formoterol (DULERA) 200-5 MCG/ACT         inhaler          (I10) Hypertension goal BP (blood pressure) < 140/90  Comment: goals of therapy reviewed; meds well tolerated.   Plan: valsartan-hydrochlorothiazide (DIOVAN HCT) 160-25 MG tablet          (E78.5) Hyperlipidemia LDL goal <130  Comment:   Lab Results   Component Value Date     12/11/2019   He is on no cholesterol medications  Plan:     (Q35.9) Cleft palate  Comment: past surgery; no ongoing issues.   Plan:     (Z92.89) History of use of hearing aid in both ears  Comment: bilateral hearing aids for approx 10 years; working well .   Plan:     (Z51.81,  Z79.899) Encounter for monitoring diuretic therapy  Comment: ARB-diuretic combination; reviewed labs; Potassium and renal function is reviewed and well controlled.   Plan: renew med combination    COUNSELING:  Reviewed preventive health counseling, as reflected in patient instructions       Regular exercise       Healthy diet/nutrition    Estimated body mass index is 23.18 kg/m  as calculated from the following:    Height as of this encounter: 1.645 m (5' 4.75\").    Weight as of this encounter: 62.7 kg (138 lb 3.2 oz).         reports that he quit smoking about 39 years ago. He has never used smokeless " tobacco.      Appropriate preventive services were discussed with this patient, including applicable screening as appropriate for cardiovascular disease, diabetes, osteopenia/osteoporosis, and glaucoma.  As appropriate for age/gender, discussed screening for colorectal cancer, prostate cancer, breast cancer, and cervical cancer. Checklist reviewing preventive services available has been given to the patient.    Reviewed patients plan of care and provided an AVS. The Basic Care Plan (routine screening as documented in Health Maintenance) for Deepak meets the Care Plan requirement. This Care Plan has been established and reviewed with the Patient.    Counseling Resources:  ATP IV Guidelines  Pooled Cohorts Equation Calculator  Breast Cancer Risk Calculator  FRAX Risk Assessment  ICSI Preventive Guidelines  Dietary Guidelines for Americans, 2010  USDA's MyPlate  ASA Prophylaxis  Lung CA Screening    Gaby Richmond MD  Internal Medicine   [facility] [place]  15 minutes in addition to HEALTH CARE MAINTENANCE are spent with patient, over 50% of that time spent providing counselling, discussing and reviewing medical conditions/concerns, meds and potential side effects.     Identified Health Risks:

## 2019-12-16 NOTE — PROGRESS NOTES
"    The patient was provided with written information regarding signs of hearing loss.  Answers for HPI/ROS submitted by the patient on 12/16/2019   Annual Exam:  In general, how would you rate your overall physical health?: good  Frequency of exercise:: 4-5 days/week  Do you usually eat at least 4 servings of fruit and vegetables a day, include whole grains & fiber, and avoid regularly eating high fat or \"junk\" foods? : Yes  Taking medications regularly:: Yes  Medication side effects:: None  Activities of Daily Living: no assistance needed  Home safety: no safety concerns identified  Hearing Impairment:: difficulty following a conversation in a noisy restaurant or crowded room, feel that people are mumbling or not speaking clearly, difficulty following dialogue in the theater, difficult to understand a speaker at a public meeting or Jainism service, need to ask people to speak up or repeat themselves, find that men's voices are easier to understand than woman's, difficulty understanding soft or whispered speech, difficulty understanding speech on the telephone  In the past 6 months, have you been bothered by leaking of urine?: No  abdominal pain: No  Blood in stool: No  Blood in urine: No  chest pain: No  chills: No  congestion: No  constipation: No  cough: No  diarrhea: No  dizziness: No  ear pain: No  eye pain: No  nervous/anxious: No  fever: No  frequency: Yes  In general, how would you rate your overall mental or emotional health?: excellent  Additional concerns today:: No  Duration of exercise:: 30-45 minutes      "

## 2019-12-16 NOTE — PATIENT INSTRUCTIONS
Patient Education   Personalized Prevention Plan  You are due for the preventive services outlined below.  Your care team is available to assist you in scheduling these services.  If you have already completed any of these items, please share that information with your care team to update in your medical record.  Health Maintenance Due   Topic Date Due     FALL RISK ASSESSMENT  12/10/2019     Annual Wellness Visit  12/10/2019       Signs of Hearing Loss     Hearing much better with one ear can be a sign of hearing loss.     Hearing loss is a problem shared by many people. In fact, it is one of the most common health conditions, particularly as people age. Most people over age 65 have some hearing loss, and by age 80, almost everyone does. Because hearing loss usually occurs slowly over the years, you may not realize your hearing ability has gotten worse.  Have your hearing checked  Contact your healthcare provider if you:    Have to strain to hear normal conversation    Have to watch other people s faces very carefully to follow what they re saying    Need to ask people to repeat what they ve said    Often misunderstand what people are saying    Turn the volume of the television or radio up so high that others complain    Feel that people are mumbling when they re talking to you    Find that the effort to hear leaves you feeling tired and irritated    Notice, when using the phone, that you hear better with one ear than the other  Date Last Reviewed: 12/1/2016 2000-2018 [employer]. [address] [place] [address], [place] [address]. All rights reserved. This information is not intended as a substitute for professional medical care. Always follow your healthcare professional's instructions.

## 2020-06-22 ENCOUNTER — TRANSFERRED RECORDS (OUTPATIENT)
Dept: HEALTH INFORMATION MANAGEMENT | Facility: CLINIC | Age: 80
End: 2020-06-22

## 2020-12-07 ENCOUNTER — OFFICE VISIT (OUTPATIENT)
Dept: PEDIATRICS | Facility: CLINIC | Age: 80
End: 2020-12-07
Payer: COMMERCIAL

## 2020-12-07 ENCOUNTER — TELEPHONE (OUTPATIENT)
Dept: FAMILY MEDICINE | Facility: CLINIC | Age: 80
End: 2020-12-07
Payer: COMMERCIAL

## 2020-12-07 ENCOUNTER — TELEPHONE (OUTPATIENT)
Dept: FAMILY MEDICINE | Facility: CLINIC | Age: 80
End: 2020-12-07

## 2020-12-07 DIAGNOSIS — Z79.899 ENCOUNTER FOR MONITORING DIURETIC THERAPY: ICD-10-CM

## 2020-12-07 DIAGNOSIS — N13.8 BENIGN PROSTATIC HYPERPLASIA WITH URINARY OBSTRUCTION: Primary | ICD-10-CM

## 2020-12-07 DIAGNOSIS — R73.9 ELEVATED BLOOD SUGAR LEVEL: ICD-10-CM

## 2020-12-07 DIAGNOSIS — E78.5 HYPERLIPIDEMIA LDL GOAL <130: ICD-10-CM

## 2020-12-07 DIAGNOSIS — I10 HYPERTENSION GOAL BP (BLOOD PRESSURE) < 140/90: Primary | ICD-10-CM

## 2020-12-07 DIAGNOSIS — N40.1 BENIGN PROSTATIC HYPERPLASIA WITH URINARY OBSTRUCTION: Primary | ICD-10-CM

## 2020-12-07 DIAGNOSIS — N13.8 BENIGN PROSTATIC HYPERPLASIA WITH URINARY OBSTRUCTION: ICD-10-CM

## 2020-12-07 DIAGNOSIS — Z51.81 ENCOUNTER FOR MONITORING DIURETIC THERAPY: ICD-10-CM

## 2020-12-07 DIAGNOSIS — N40.1 BENIGN PROSTATIC HYPERPLASIA WITH URINARY OBSTRUCTION: ICD-10-CM

## 2020-12-07 LAB
ALBUMIN UR-MCNC: NEGATIVE MG/DL
APPEARANCE UR: CLEAR
BILIRUB UR QL STRIP: NEGATIVE
COLOR UR AUTO: ABNORMAL
GLUCOSE UR STRIP-MCNC: NEGATIVE MG/DL
HGB UR QL STRIP: ABNORMAL
KETONES UR STRIP-MCNC: NEGATIVE MG/DL
LEUKOCYTE ESTERASE UR QL STRIP: NEGATIVE
MUCOUS THREADS #/AREA URNS LPF: PRESENT /LPF
NITRATE UR QL: NEGATIVE
PH UR STRIP: 5.5 PH (ref 5–7)
RBC #/AREA URNS AUTO: 12 /HPF (ref 0–2)
SOURCE: ABNORMAL
SP GR UR STRIP: 1.02 (ref 1–1.03)
UROBILINOGEN UR STRIP-MCNC: NORMAL MG/DL (ref 0–2)
WBC #/AREA URNS AUTO: 1 /HPF (ref 0–5)

## 2020-12-07 PROCEDURE — 81001 URINALYSIS AUTO W/SCOPE: CPT | Performed by: PREVENTIVE MEDICINE

## 2020-12-07 PROCEDURE — 99215 OFFICE O/P EST HI 40 MIN: CPT | Performed by: PREVENTIVE MEDICINE

## 2020-12-07 PROCEDURE — 99207 REFERRAL TO ACUTE AND DIAGNOSTIC SERVICES: CPT | Performed by: INTERNAL MEDICINE

## 2020-12-07 RX ORDER — TAMSULOSIN HYDROCHLORIDE 0.4 MG/1
0.4 CAPSULE ORAL DAILY
Qty: 10 CAPSULE | Refills: 0 | Status: SHIPPED | OUTPATIENT
Start: 2020-12-07 | End: 2020-12-17

## 2020-12-07 NOTE — NURSING NOTE
Catheter insertion documentation on 12/7/2020:    Deepak Mckay presents to the clinic for catheter insertion.  Reason for insertion: urinary retention  Order has been verified. yes  Catheter successfully inserted into the urethral meatus in the usual sterile fashion without immediate complication.  Type of catheter placed: 14 Spanish  Coude catheter  Urine is yellow in color.  1025 cc's of urine output returned.  Balloon was filled with 10cc's of normal saline.  Securement device placed for the catheter.  The patient tolerated the procedure and was instructed to follow up with their PCP or consultant as planned, monitor for catheter dysfunction, monitor for pain or discomfort, return or call for pain, fever, leakage or decreased urine flow and watch for signs of infection    Anuradha Kelly RN

## 2020-12-07 NOTE — PATIENT INSTRUCTIONS
Patient Education     Discharge Instructions: Caring for Your Indwelling Urinary Catheter  You have been discharged with an indwelling urinary catheter (also called a Sandoval catheter ). A catheter is a thin, flexible tube. An indwelling urinary catheter has two parts. The first part is a tube that drains urine from your bladder. The second part is a bag or other device that collects the urine.   The most important thing to remember is that you want to prevent infection. Always wash your hands before handling your catheter bag or tubing.   Draining the bedside bag    Wash your hands with soap and clean, running water. Or use an alcohol-based hand  that contains at least 60% alcohol.    Hold the drainage tube over a toilet or measuring container.    Unclamp the tube and let the bag drain.    Don t touch the tip of the drainage tube or let it touch the toilet or container.    You don't need to rinse the bag or drainage tube.    Cleaning the drainage tube    When the bag is empty, clean the tip of the drainage tube with an alcohol wipe.    Clamp the tube.    Reinsert the tube into the pocket on the drainage bag.    Cleaning your skin and tubing    Clean the skin near the catheter with soap and water.    Wash your genital area from front to back.    Wash the catheter tubing. Always wash the catheter in the direction away from your body.    You will be told when and how to change your bag and tubing.    Don t try to remove the catheter by yourself.    You may shower with the catheter in place.    Emptying a leg bag    Wash your hands.    Remove the stopper on the bag.    Drain the bag into the toilet or a measuring container. Don t let the tip of the drainage tube touch anything, including your fingers.    Clean the tip of the drainage tube with alcohol.    Replace the stopper.    Follow-up care  Make a follow-up appointment, or as directed by your healthcare provider   When to call your healthcare  provider  Call your healthcare provider right away if you have any of the following:    Fever of 100.4 F ( 38 C) or higher, or as directed by your provider    Chills    Leakage around the catheter insertion site    Increased spasms (uncontrollable twitching) in your legs, belly (abdomen), or bladder. Occasional mild spasms are normal.    Burning in the urinary tract, penis, or genital area    Nausea and vomiting    Aching in the lower back    Cloudy or bloody (pink or red) urine, sediment or mucus in the urine, or bad-smelling urine  Joan last reviewed this educational content on 12/1/2019 2000-2020 [employer]. [address] [place] [address], [place] [address]. All rights reserved. This information is not intended as a substitute for professional medical care. Always follow your healthcare professional's instructions.           Patient Education     Discharge Instructions: Caring for Your Leg Bag   You are going home with a urinary catheter and collection device (drainage bag) in place. One type of collection device is called a leg bag. This is a smaller drainage bag that you can wear on your leg to collect urine during the day. The bag can fit under your clothing. You can move around with greater ease when using a leg bag instead of a larger collection bag.   You were shown how to care for your catheter in the hospital. This sheet will help you remember those steps when you are at home.   Home care    Wash your hands thoroughly before and after you care for your catheter or collection device.    Gather your supplies:  ? Alcohol wipes  ? Soap and water  ? Towel and washcloth  ? Leg strap and leg bag    Use soap and water to wash the area where your catheter enters your body. Rinse well.    Secure the bag jara to your leg:  ? Put the leg band high on your thigh with the product label pointing away from your leg.  ? Stretch the leg band in place and fasten.  ? Place the catheter tubing over the bag and  secure it. You may secure it with a Velcro tab or other method, depending on the product you use. Be sure to leave enough loop in the catheter above the leg band so you won't pull on the tube.  ? Every 4 to 6 hours, reposition the band. This will prevent pressure from the elastic on your leg. You can do this by changing the bag to the other leg or by raising or lowering the leg band.  ? Wash the band as often as needed. You can hand wash and dry the leg band.    Place the bag in the bag jara.    Clean the urine bag end of the catheter and your catheter port with an alcohol wipe.    Place a towel under the bag and port to keep urine from dripping onto your leg.    Before connecting the outlet valve at the bottom of the bag to the catheter, make sure that it is firmly closed. Flip the valve up toward the bag. It needs to snap firmly in place. Don't tug on the tubing. Be gentle.    Attach the urine bag to the end of the catheter. Insert the connector snugly into the catheter port. You can prevent dribbling urine by bending the catheter tubing just below the tip and holding it while you disconnect it from the catheter. Be careful to keep the tip clean while connecting the leg bag tubing to the catheter. This keeps germs from getting into the system.    Drain the bag when it's full. To drain the bag, flip the clamp downward. Direct the flexible outlet tube to control the flow of urine. You don t have to disconnect the leg bag from the catheter to empty it. Raise your leg up to the edge of the toilet to reach the leg bag. Then you can empty the bag directly into the toilet. This way, you won t need to bend over, which may be uncomfortable.    Keep the leg bag clean. Your healthcare provider may advise that you use a certain solution to clean the bag. Solutions that may be advised include:  ? 2 parts vinegar and 3 parts water  ? 1 tablespoon of chlorine bleach mixed with a half cup of water    Ask your healthcare  provider how often you should clean your bag and what solution you should use ?to reduce odor and keep the bag free of germs.    Shake the solution a bit and allow it to remain in the bag for 30 minutes.    Drain the solution and rinse the bag with cold tap water.    Hang the bag to drain and air dry.    Remember to keep the drainage bag below the level of your bladder for correct drainage.    Follow-up  Make a follow-up appointment as directed by your healthcare provider.  When to call your healthcare provider  Call your healthcare provider right away if you have any of the following:    Redness, swelling, or warmth around the catheter entry site    Pus draining from your catheter entry site or into the catheter tubing and bag    Blood, clots, or floating debris in the urine    Nausea and vomiting    Shaking chills    Fever above 100.4 F ( 38 C), or as directed by your provider    Pain that is not eased by medicine     Catheter that falls out or is dislodged  Joan last reviewed this educational content on 10/1/2019    8652-6606 [employer]. [address] [place] [address]. All rights reserved. This information is not intended as a substitute for professional medical care. Always follow your healthcare professional's instructions.           Patient Education     Discharge Instructions: Caring for Your Leg Bag   You are going home with a urinary catheter and collection device (drainage bag) in place. One type of collection device is called a leg bag. This is a smaller drainage bag that you can wear on your leg to collect urine during the day. The bag can fit under your clothing. You can move around with greater ease when using a leg bag instead of a larger collection bag.   You were shown how to care for your catheter in the hospital. This sheet will help you remember those steps when you are at home.   Home care    Wash your hands thoroughly before and after you care for your catheter or  collection device.    Gather your supplies:  ? Alcohol wipes  ? Soap and water  ? Towel and washcloth  ? Leg strap and leg bag    Use soap and water to wash the area where your catheter enters your body. Rinse well.    Secure the bag jara to your leg:  ? Put the leg band high on your thigh with the product label pointing away from your leg.  ? Stretch the leg band in place and fasten.  ? Place the catheter tubing over the bag and secure it. You may secure it with a Velcro tab or other method, depending on the product you use. Be sure to leave enough loop in the catheter above the leg band so you won't pull on the tube.  ? Every 4 to 6 hours, reposition the band. This will prevent pressure from the elastic on your leg. You can do this by changing the bag to the other leg or by raising or lowering the leg band.  ? Wash the band as often as needed. You can hand wash and dry the leg band.    Place the bag in the bag jara.    Clean the urine bag end of the catheter and your catheter port with an alcohol wipe.    Place a towel under the bag and port to keep urine from dripping onto your leg.    Before connecting the outlet valve at the bottom of the bag to the catheter, make sure that it is firmly closed. Flip the valve up toward the bag. It needs to snap firmly in place. Don't tug on the tubing. Be gentle.    Attach the urine bag to the end of the catheter. Insert the connector snugly into the catheter port. You can prevent dribbling urine by bending the catheter tubing just below the tip and holding it while you disconnect it from the catheter. Be careful to keep the tip clean while connecting the leg bag tubing to the catheter. This keeps germs from getting into the system.    Drain the bag when it's full. To drain the bag, flip the clamp downward. Direct the flexible outlet tube to control the flow of urine. You don t have to disconnect the leg bag from the catheter to empty it. Raise your leg up to the edge of  the toilet to reach the leg bag. Then you can empty the bag directly into the toilet. This way, you won t need to bend over, which may be uncomfortable.    Keep the leg bag clean. Your healthcare provider may advise that you use a certain solution to clean the bag. Solutions that may be advised include:  ? 2 parts vinegar and 3 parts water  ? 1 tablespoon of chlorine bleach mixed with a half cup of water    Ask your healthcare provider how often you should clean your bag and what solution you should use ?to reduce odor and keep the bag free of germs.    Shake the solution a bit and allow it to remain in the bag for 30 minutes.    Drain the solution and rinse the bag with cold tap water.    Hang the bag to drain and air dry.    Remember to keep the drainage bag below the level of your bladder for correct drainage.    Follow-up  Make a follow-up appointment as directed by your healthcare provider.  When to call your healthcare provider  Call your healthcare provider right away if you have any of the following:    Redness, swelling, or warmth around the catheter entry site    Pus draining from your catheter entry site or into the catheter tubing and bag    Blood, clots, or floating debris in the urine    Nausea and vomiting    Shaking chills    Fever above 100.4 F ( 38 C), or as directed by your provider    Pain that is not eased by medicine     Catheter that falls out or is dislodged  Joan last reviewed this educational content on 10/1/2019    3113-5373 [employer]. [address] [place] [address]. All rights reserved. This information is not intended as a substitute for professional medical care. Always follow your healthcare professional's instructions.    (N40.1,  N13.8) Benign prostatic hyperplasia with urinary obstruction  Plan: tamsulosin (FLOMAX) 0.4 MG capsule, UROLOGY         ADULT REFERRAL, UA reflex to Microscopic (Christine Coombs; [facility]), Indwelling urinary          catheter (Sandoval), CANCELED: UA reflex to         Microscopic and Culture  Pt unable to much urine.  PVR >1000  Sandoval catheter placed with more than 1000 ml output.  Patient had significant relief and felt well on discharge.  Pt started on flomax 0.4 mg daily. Advised to follow up with urology in one week for reassessment.

## 2020-12-07 NOTE — TELEPHONE ENCOUNTER
Deepak calling in unable to urinate since yesterday morning.   Pt is in pain.   No fever.   No other symptoms noted.    Dr Delgado- called [facility] to see if they could see him and/or assist.     They accepted the pt and I notified the patient.     Sara COHEN RN

## 2020-12-07 NOTE — TELEPHONE ENCOUNTER
Reason for call:  Order   Order or referral being requested: Annual Labs  Reason for request: Patient would like to get his annual labs before his physical  Date needed: by 1/13/21   Has the patient been seen by the PCP for this problem? NO    Additional comments: Patient has a lab appt 1/13/21 and is hoping Dr. Richmond will put in orders for his annual labs. Patient is also scheduled for his annual physical 1/20/21.    Phone number to reach patient:  Cell number on file:    Telephone Information:   Mobile [phone]       Best Time:  any    Can we leave a detailed message on this number?  YES    Travel screening: Not Applicable     Margie Boogie,

## 2020-12-08 VITALS
BODY MASS INDEX: 24.15 KG/M2 | DIASTOLIC BLOOD PRESSURE: 85 MMHG | TEMPERATURE: 97.8 F | HEART RATE: 54 BPM | RESPIRATION RATE: 18 BRPM | SYSTOLIC BLOOD PRESSURE: 138 MMHG | OXYGEN SATURATION: 96 % | WEIGHT: 144 LBS

## 2020-12-08 NOTE — PROGRESS NOTES
SUBJECTIVE:  Deepak Mckay, a 80 year old male scheduled an appointment to discuss the following issues:  Benign prostatic hyperplasia with urinary obstruction     Patient is here at the [facility] unable to urinate for the past 30 hours.  He was urinating fine before this time with reasonable urinary flow.  No pain with urination, abdominal pain, change  In bowel movements, fever or chills.  He does feel some fullness over the bladder area of his abdomen.    Medical, social, surgical, and family histories reviewed.    Past Medical History:   Diagnosis Date     Brachial neuritis or radiculitis NOS     LUE weakness >> R UE      Cleft palate     multiple surgical repairs     Hypertension      Hypertrophy of prostate without urinary obstruction and other lower urinary tract symptoms (LUTS)      Obstructive chronic bronchitis with exacerbation (H) 1995     Squamous cell carcinoma of skin of scalp 11/2016     Unspecified cataract     left     Unspecified tinnitus      SH    reports that he quit smoking about 40 years ago. He has never used smokeless tobacco. He reports current alcohol use of about 1.7 standard drinks of alcohol per week. He reports that he does not use drugs.    Family History   Problem Relation Age of Onset     C.A.D. Mother         CABG     Diabetes Maternal Uncle      C.A.D. Maternal Uncle         MI     Hypertension Maternal Uncle      Cancer - colorectal No family hx of        ROS:  CONSTITUTIONAL: NEGATIVE for fever, chills  EYES: NEGATIVE for vision changes   RESP: NEGATIVE for significant cough or SOB  CV: NEGATIVE for chest pain, palpitations   GI: NEGATIVE for nausea, abdominal pain, heartburn, or change in bowel habits  : NEGATIVE for frequency, dysuria, or hematuria  MUSCULOSKELETAL: NEGATIVE for significant arthralgias or myalgia  NEURO: NEGATIVE for weakness, dizziness or paresthesias or headache    OBJECTIVE:  BP (!) 168/87 (BP Location: Left arm, Patient Position: Chair, Cuff Size: Adult  Large)   Pulse 54   Temp 97.8  F (36.6  C) (Oral)   Resp 18   Wt 65.3 kg (144 lb)   SpO2 96%   BMI 24.15 kg/m    EXAM:  GENERAL APPEARANCE: healthy, alert and no distress  EYES: EOMI,  PERRL  HENT: ear canals and TM's normal and nose and mouth without ulcers or lesions  RESP: lungs clear to auscultation - no rales, rhonchi or wheezes  CV: regular rates and rhythm, normal S1 S2, no S3 or S4 and no murmur, click or rub -  ABDOMEN:  soft, nontender, no HSM or masses and bowel sounds normal    UA - neg nitrite,neg LE, 1 wbc, 12 rbc    ASSESSMENT/PLAN:  (N40.1,  N13.8) Benign prostatic hyperplasia with urinary obstruction  Plan: tamsulosin (FLOMAX) 0.4 MG capsule, UROLOGY         ADULT REFERRAL, UA reflex to Microscopic (Christine Coombs; [facility]), Indwelling urinary         catheter (Sandoval), CANCELED: UA reflex to         Microscopic and Culture  Pt unable to much urine.  PVR >1000  Sandoval catheter placed with more than 1000 ml output.  Patient had significant relief and felt well on discharge.  Pt started on flomax 0.4 mg daily. Advised to follow up with urology in one week for reassessment.    40 minutes spent face to face with patient, over 50% time counseling, coordinating care and explaining about nature of the patient's conditions.

## 2020-12-09 ENCOUNTER — TELEPHONE (OUTPATIENT)
Dept: FAMILY MEDICINE | Facility: CLINIC | Age: 80
End: 2020-12-09

## 2020-12-09 ENCOUNTER — HOSPITAL ENCOUNTER (EMERGENCY)
Facility: CLINIC | Age: 80
Discharge: HOME | End: 2020-12-09
Attending: EMERGENCY MEDICINE | Admitting: EMERGENCY MEDICINE
Payer: COMMERCIAL

## 2020-12-09 VITALS
OXYGEN SATURATION: 99 % | RESPIRATION RATE: 20 BRPM | SYSTOLIC BLOOD PRESSURE: 142 MMHG | HEART RATE: 82 BPM | TEMPERATURE: 98 F | WEIGHT: 143.3 LBS | BODY MASS INDEX: 24.03 KG/M2 | DIASTOLIC BLOOD PRESSURE: 71 MMHG

## 2020-12-09 DIAGNOSIS — I10 HYPERTENSION, UNSPECIFIED TYPE: ICD-10-CM

## 2020-12-09 DIAGNOSIS — R31.9 HEMATURIA, UNSPECIFIED TYPE: ICD-10-CM

## 2020-12-09 DIAGNOSIS — Z97.8 PRESENCE OF INDWELLING FOLEY CATHETER: ICD-10-CM

## 2020-12-09 LAB
ALBUMIN UR-MCNC: 70 MG/DL
ANION GAP SERPL CALCULATED.3IONS-SCNC: 5 MMOL/L (ref 3–14)
APPEARANCE UR: ABNORMAL
BASOPHILS # BLD AUTO: 0 10E9/L (ref 0–0.2)
BASOPHILS NFR BLD AUTO: 0 %
BILIRUB UR QL STRIP: NEGATIVE
BUN SERPL-MCNC: 25 MG/DL (ref 7–30)
CALCIUM SERPL-MCNC: 8.5 MG/DL (ref 8.5–10.1)
CHLORIDE SERPL-SCNC: 102 MMOL/L (ref 94–109)
CO2 SERPL-SCNC: 31 MMOL/L (ref 20–32)
COLOR UR AUTO: YELLOW
CREAT SERPL-MCNC: 0.85 MG/DL (ref 0.66–1.25)
DIFFERENTIAL METHOD BLD: ABNORMAL
DIFFERENTIAL METHOD BLD: NORMAL
EOSINOPHIL # BLD AUTO: 0 10E9/L (ref 0–0.7)
EOSINOPHIL NFR BLD AUTO: 0 %
ERYTHROCYTE [DISTWIDTH] IN BLOOD BY AUTOMATED COUNT: 13.4 % (ref 10–15)
ERYTHROCYTE [DISTWIDTH] IN BLOOD BY AUTOMATED COUNT: NORMAL % (ref 10–15)
GFR SERPL CREATININE-BSD FRML MDRD: 81 ML/MIN/{1.73_M2}
GLUCOSE SERPL-MCNC: 113 MG/DL (ref 70–99)
GLUCOSE UR STRIP-MCNC: NEGATIVE MG/DL
HCT VFR BLD AUTO: 34.9 % (ref 40–53)
HCT VFR BLD AUTO: NORMAL % (ref 40–53)
HGB BLD-MCNC: 11.7 G/DL (ref 13.3–17.7)
HGB BLD-MCNC: NORMAL G/DL (ref 13.3–17.7)
HGB UR QL STRIP: ABNORMAL
KETONES UR STRIP-MCNC: NEGATIVE MG/DL
LEUKOCYTE ESTERASE UR QL STRIP: ABNORMAL
LYMPHOCYTES # BLD AUTO: 3.9 10E9/L (ref 0.8–5.3)
LYMPHOCYTES NFR BLD AUTO: 50 %
MCH RBC QN AUTO: 31.3 PG (ref 26.5–33)
MCH RBC QN AUTO: NORMAL PG (ref 26.5–33)
MCHC RBC AUTO-ENTMCNC: 33.5 G/DL (ref 31.5–36.5)
MCHC RBC AUTO-ENTMCNC: NORMAL G/DL (ref 31.5–36.5)
MCV RBC AUTO: 93 FL (ref 78–100)
MCV RBC AUTO: NORMAL FL (ref 78–100)
MONOCYTES # BLD AUTO: 1.5 10E9/L (ref 0–1.3)
MONOCYTES NFR BLD AUTO: 19 %
MUCOUS THREADS #/AREA URNS LPF: PRESENT /LPF
NEUTROPHILS # BLD AUTO: 2.4 10E9/L (ref 1.6–8.3)
NEUTROPHILS NFR BLD AUTO: 31 %
NITRATE UR QL: NEGATIVE
PH UR STRIP: 6.5 PH (ref 5–7)
PLATELET # BLD AUTO: 142 10E9/L (ref 150–450)
PLATELET # BLD AUTO: NORMAL 10E9/L (ref 150–450)
PLATELET # BLD EST: ABNORMAL 10*3/UL
POTASSIUM SERPL-SCNC: 3.8 MMOL/L (ref 3.4–5.3)
RBC # BLD AUTO: 3.74 10E12/L (ref 4.4–5.9)
RBC # BLD AUTO: NORMAL 10E12/L (ref 4.4–5.9)
RBC #/AREA URNS AUTO: >182 /HPF (ref 0–2)
RBC MORPH BLD: ABNORMAL
SODIUM SERPL-SCNC: 138 MMOL/L (ref 133–144)
SOURCE: ABNORMAL
SP GR UR STRIP: 1.02 (ref 1–1.03)
UROBILINOGEN UR STRIP-MCNC: NORMAL MG/DL (ref 0–2)
VARIANT LYMPHS BLD QL SMEAR: PRESENT
WBC # BLD AUTO: 7.8 10E9/L (ref 4–11)
WBC # BLD AUTO: NORMAL 10E9/L (ref 4–11)
WBC #/AREA URNS AUTO: 4 /HPF (ref 0–5)

## 2020-12-09 PROCEDURE — 96360 HYDRATION IV INFUSION INIT: CPT

## 2020-12-09 PROCEDURE — 81001 URINALYSIS AUTO W/SCOPE: CPT | Performed by: EMERGENCY MEDICINE

## 2020-12-09 PROCEDURE — 85025 COMPLETE CBC W/AUTO DIFF WBC: CPT | Performed by: EMERGENCY MEDICINE

## 2020-12-09 PROCEDURE — 99283 EMERGENCY DEPT VISIT LOW MDM: CPT | Mod: 25

## 2020-12-09 PROCEDURE — 80048 BASIC METABOLIC PNL TOTAL CA: CPT | Performed by: EMERGENCY MEDICINE

## 2020-12-09 PROCEDURE — 258N000003 HC RX IP 258 OP 636: Performed by: EMERGENCY MEDICINE

## 2020-12-09 PROCEDURE — 87086 URINE CULTURE/COLONY COUNT: CPT | Performed by: EMERGENCY MEDICINE

## 2020-12-09 RX ORDER — SODIUM CHLORIDE 9 MG/ML
INJECTION, SOLUTION INTRAVENOUS CONTINUOUS
Status: DISCONTINUED | OUTPATIENT
Start: 2020-12-09 | End: 2020-12-09 | Stop reason: HOSPADM

## 2020-12-09 RX ADMIN — SODIUM CHLORIDE 1000 ML: 9 INJECTION, SOLUTION INTRAVENOUS at 15:06

## 2020-12-09 ASSESSMENT — ENCOUNTER SYMPTOMS
FEVER: 0
NAUSEA: 0
ABDOMINAL PAIN: 0
VOMITING: 0
DIZZINESS: 0
HEMATURIA: 1
HEADACHES: 0
CHILLS: 0

## 2020-12-09 NOTE — ED AVS SNAPSHOT
[facility] Emergency Dept  [address]  [facility] [address]  Phone: [phone]  Fax: [phone]                                    Deepak Mckay   MRN: [id number]    Department: [facility] Emergency Dept   Date of Visit: 12/9/2020           After Visit Summary Signature Page    I have received my discharge instructions, and my questions have been answered. I have discussed any challenges I see with this plan with the nurse or doctor.    ..........................................................................................................................................  Patient/Patient Representative Signature      ..........................................................................................................................................  Patient Representative Print Name and Relationship to Patient    ..................................................               ................................................  Date                                   Time    ..........................................................................................................................................  Reviewed by Signature/Title    ...................................................              ..............................................  Date                                               Time          22EPIC Rev 08/18

## 2020-12-09 NOTE — ED TRIAGE NOTES
"Pt had louise catheter inserted on Monday after inabililty to urinate.  He notices blood in urine today.  No pain \"just a touch of discomfort when moving around\".  "

## 2020-12-09 NOTE — TELEPHONE ENCOUNTER
Spoke to Jayda Howard about below and reviewed note from 12/7/2020.  Advised to be seen in the ER.      Called the pt and advised.

## 2020-12-09 NOTE — ED PROVIDER NOTES
History     Chief Complaint:  Catheter Problem    HPI   Deepak Mckay is a 80 year old male with a history of hyperlipidemia, hypertension, and BPH who presents for evaluation of a catheter problem. The patient reports 2 days ago he had a louise catheter placed secondary to an enlarged prostate. He presents today because around 1200 he noticed blood in his output bag. He denies any blood clots and a change in output. He denies chills, fever, abdominal pain, nausea, vomiting, dizziness, headaches, and abnormal bruising or bleeding.    Allergies:  No known drug allergies      Medications:    Albuterol   Dulera  Flomax  Diovan     Past Medical History:    COPD   BPH  Pulmonary emphysema   Cleft palate  Hypertension   Hyperlipidemia  Cataract  Other psoriasis  Brachial neuritis   SCC of skin of scalp   Tinnitus     Past Surgical History:    Cataract removal  Benign polyps  Hernia repair   Insert appliance cleft palate   Cervical disk 4th plating    Family History:    CABG    Social History:  Smoking status- former smoker  Alcohol use- yes  Drug use- no   Marital Status:       Review of Systems   Constitutional: Negative for chills and fever.   Gastrointestinal: Negative for abdominal pain, nausea and vomiting.   Genitourinary: Positive for hematuria. Negative for decreased urine volume.   Neurological: Negative for dizziness and headaches.   All other systems reviewed and are negative.    Physical Exam     Patient Vitals for the past 24 hrs:   BP Temp Temp src Pulse Resp SpO2 Weight   12/09/20 1421 (!) 157/55 99.3  F (37.4  C) Temporal 92 16 98 % 65 kg (143 lb 4.8 oz)     Physical Exam  General: Elderly male, sitting upright  Eyes: PERRL, Conjunctive within normal limits  ENT: Moist mucous membranes, oropharynx clear.   CV: Normal S1S2, no murmur, rub or gallop. Regular rate and rhythm  Resp: Clear to auscultation bilaterally. Normal respiratory effort.  GI: Abdomen is soft, nontender and nondistended. No  palpable masses. No rebound or guarding.  : Sandoval catheter in place. Dark colored urine in tubing and leg bag. No clots or bright red blood.  MSK: No edema. Nontender. Normal active range of motion.  Skin: Warm and dry. No rashes or lesions or ecchymoses on visible skin.  Neuro: Alert and oriented. Responds appropriately to all questions and commands. No focal findings appreciated. Normal muscle tone.  Psych: Normal mood and affect. Pleasant.    Emergency Department Course       Laboratory:  Laboratory findings were communicated with the patient who voiced understanding of the findings.    CBC: RBC Count 3.74 (L), HGB 11.7 (L), Hematocrit 34.9 (L), PLT (L), absolute monocytes 1.5(H) o/w WNL  BMP: Glucose 113(H) o/w WNL (Creatinine: 0.85)    UA with Microscopic: blood - large, Protein Albumin 70, leukocyte esterase - small, RBC >182 (H), mucous - present, o/w negative.      Interventions:  1506 NS 1L IV Bolus         Emergency Department Course:  Past medical records, nursing notes, and vitals reviewed.    1443 I performed an exam of the patient as documented above.     1501 IV was inserted and blood was drawn for laboratory testing, results above.    1509 The patient provided a urine sample here in the emergency department. This was sent for laboratory testing, findings above.    I rechecked the patient and discussed the results of the ED workup thus far.  He continues to deny any symptoms.  The Sandoval catheter is now draining clear yellow urine.  I discussed the findings with the patient.  He felt comfortable plan for discharge home and outpatient follow-up as scheduled with urology.    Findings and plan explained to the Patient. Patient discharged home with instructions regarding supportive care, medications, and reasons to return. The importance of close follow-up was reviewed.     Impression & Plan       Medical Decision Making:  Deepak Mckay is an 80-year-old male with a history of prostate hypertrophy with  Sandoval catheter in place for now 3 days who presents emergency department concerns for bloody urine appearance.  He is denying any pain, fever, vomiting etc.  He was still putting out the same amount of urine.  The urine cleared while in the ED.  Is not clear if there is a clot from initial insertion that became dislodged and passed through the tubing or alternative mild inflammatory change which cause some blood in the urine but this time urinalysis did not show any significant signs of infection.  The urine is clear visibly and the patient is asymptomatic.  It seems appropriate to discharge the patient home with outpatient follow-up as scheduled urology.  He felt comfortable with plan.  He should come back if he has decreased urine output, clots in the urine, abdominal pain, fever, vomiting, etc.  He felt comfortable plan.  All question answered prior to discharge.    Diagnosis:    ICD-10-CM    1. Hematuria, unspecified type  R31.9 CBC with platelets differential     Urine Culture Aerobic Bacterial   2. Presence of indwelling Sandoval catheter  Z97.8    3. Hypertension, unspecified type  I10      Disposition:  Discharged to home.    Scribe Disclosure:  IGeorgina, am serving as a scribe at 2:28 PM on 12/9/2020 to document services personally performed by Christi Gomez MD based on my observations and the provider's statements to me.       Scribe Disclosure:  IGraham, am serving as a scribe at 4:06 PM on 12/9/2020 to document services personally performed by Christi Gomez MD based on my observations and the provider's statements to me.         Christi Gomez MD  12/09/20 7280

## 2020-12-09 NOTE — TELEPHONE ENCOUNTER
Reason for call:  Patient reporting a symptom    Symptom or request: blood in Urine-bright red today    Duration (how long have symptoms been present): today-catheter placed 12/7/2020 at [facility]    Have you been treated for this before? Urinary issues - [facility] 12/7/20    Additional comments: Please advise pt    Phone Number patient can be reached at:  Home number on file [phone] (home)    Best Time:  Any today    Can we leave a detailed message on this number:  Not Applicable    Call taken on 12/9/2020 at 1:35 PM by Angelika Calderon

## 2020-12-09 NOTE — TELEPHONE ENCOUNTER
Called and spoke to the pt.      He still has the catheter in.  He goes to see Urology - Dr. Hernandez on Monday.    The urine started being quite red today.  It is still red.  He is not having any pain.  No back pain.  No fever.  No blood clots.  No abdominal pain.

## 2020-12-09 NOTE — Clinical Note
Written and erbal discharge education provided. Pt verbalized understanding and willingness to comply. Pt deneis any questions or concerns.

## 2020-12-10 LAB
BACTERIA SPEC CULT: ABNORMAL
Lab: ABNORMAL
SPECIMEN SOURCE: ABNORMAL

## 2020-12-11 NOTE — RESULT ENCOUNTER NOTE
Final urine culture report is NEGATIVE per [facility] Result protocol.    If NEGATIVE result, no change in treatment, per [facility] Result protocol.

## 2020-12-14 ENCOUNTER — OFFICE VISIT (OUTPATIENT)
Dept: UROLOGY | Facility: CLINIC | Age: 80
End: 2020-12-14
Attending: PREVENTIVE MEDICINE
Payer: COMMERCIAL

## 2020-12-14 VITALS
BODY MASS INDEX: 23.32 KG/M2 | SYSTOLIC BLOOD PRESSURE: 110 MMHG | DIASTOLIC BLOOD PRESSURE: 70 MMHG | WEIGHT: 140 LBS | HEIGHT: 65 IN

## 2020-12-14 DIAGNOSIS — N40.1 BENIGN PROSTATIC HYPERPLASIA WITH URINARY OBSTRUCTION: ICD-10-CM

## 2020-12-14 DIAGNOSIS — R33.9 URINARY RETENTION: Primary | ICD-10-CM

## 2020-12-14 DIAGNOSIS — N13.8 BENIGN PROSTATIC HYPERPLASIA WITH URINARY OBSTRUCTION: ICD-10-CM

## 2020-12-14 DIAGNOSIS — N40.0 ENLARGED PROSTATE: ICD-10-CM

## 2020-12-14 DIAGNOSIS — Z79.2 PROPHYLACTIC ANTIBIOTIC: ICD-10-CM

## 2020-12-14 PROCEDURE — 52000 CYSTOURETHROSCOPY: CPT | Performed by: UROLOGY

## 2020-12-14 PROCEDURE — 99204 OFFICE O/P NEW MOD 45 MIN: CPT | Mod: 25 | Performed by: UROLOGY

## 2020-12-14 RX ORDER — LIDOCAINE HYDROCHLORIDE 20 MG/ML
JELLY TOPICAL ONCE
Status: COMPLETED | OUTPATIENT
Start: 2020-12-14 | End: 2020-12-14

## 2020-12-14 RX ORDER — CIPROFLOXACIN 500 MG/1
500 TABLET, FILM COATED ORAL ONCE
Qty: 1 TABLET | Refills: 0 | Status: SHIPPED | OUTPATIENT
Start: 2020-12-14 | End: 2020-12-14

## 2020-12-14 RX ORDER — TAMSULOSIN HYDROCHLORIDE 0.4 MG/1
0.4 CAPSULE ORAL DAILY
Qty: 90 CAPSULE | Refills: 3 | Status: SHIPPED | OUTPATIENT
Start: 2020-12-14 | End: 2021-12-06

## 2020-12-14 RX ORDER — FINASTERIDE 5 MG/1
5 TABLET, FILM COATED ORAL DAILY
Qty: 90 TABLET | Refills: 3 | Status: SHIPPED | OUTPATIENT
Start: 2020-12-14 | End: 2021-12-06

## 2020-12-14 RX ADMIN — LIDOCAINE HYDROCHLORIDE: 20 JELLY TOPICAL at 08:59

## 2020-12-14 ASSESSMENT — MIFFLIN-ST. JEOR: SCORE: 1261.92

## 2020-12-14 ASSESSMENT — PAIN SCALES - GENERAL: PAINLEVEL: NO PAIN (0)

## 2020-12-14 NOTE — LETTER
12/14/2020       RE: Deepak Mckay  [address] [place] [address]     Dear Colleague,    Thank you for referring your patient, Deepak Mckay, to the [facility] [place] at [facility]. Please see a copy of my visit note below.    [facility]  Main Office: [address]  [address]  [place] [address]       CHIEF COMPLAINT:  Urinary retention    HISTORY:   I was asked by Dr. Marin Duran to see this 80-year-old gentleman who presented with urinary retention 1 week ago.  He had a Sandoval catheter placed for over 1000 mL of output.  His urinalysis at the time did show 12 red blood cells per high-powered field but otherwise was negative for infection.  He was started on Flomax.  He had one episode of hematuria last week after he was active and went for a long walk with a Sandoval in place but otherwise he has done well.    I saw him previously in 2011 when his PSA was elevated at 7.4.  He underwent prostate biopsy at that time that showed an 80 g prostate but no prostate cancer was present.  He did not follow-up with me again after that time but it does appear that his PSA went down after that biopsy.  His final PSA was 5.58 in 2014.    In light of his recent history of retention I recommended a cystoscopy in clinic today.      PAST MEDICAL HISTORY:   Past Medical History:   Diagnosis Date     Brachial neuritis or radiculitis NOS     LUE weakness >> R UE      Cleft palate     multiple surgical repairs     Hernia, abdominal      History of spinal cord injury      Hypertension      Hypertrophy of prostate without urinary obstruction and other lower urinary tract symptoms (LUTS)      Obstructive chronic bronchitis with exacerbation (H) 1995     Squamous cell carcinoma of skin of scalp 11/2016     Unspecified cataract     left     Unspecified tinnitus        PAST SURGICAL HISTORY:   Past Surgical History:   Procedure Laterality Date      COLONOSCOPY N/A 2014    Procedure: COLONOSCOPY;  Surgeon: Clay Henley MD;  Location: [facility]     EYE SURGERY      catarct removal      HC COLONOSCOPY THRU STOMA, DIAGNOSTIC  ;     benign polyps in      HERNIA REPAIR           INSERT APPLIANCE CLEFT PALATE      multiple revisions.      VASECTOMY       ZZC NONSPECIFIC PROCEDURE  2001    cervical disk 4th plating       FAMILY HISTORY:   Family History   Problem Relation Age of Onset     C.A.D. Mother         CABG     Diabetes Maternal Uncle      C.A.D. Maternal Uncle         MI     Hypertension Maternal Uncle      Cancer - colorectal No family hx of        SOCIAL HISTORY:   Social History     Tobacco Use     Smoking status: Former Smoker     Quit date: 1980     Years since quittin.9     Smokeless tobacco: Never Used     Tobacco comment: Pipie smoker, quit  max 1/2ppd cigarettes since    Substance Use Topics     Alcohol use: Yes     Alcohol/week: 1.7 standard drinks     Types: 2 Standard drinks or equivalent per week     Comment: 1 drink a week          Allergies   Allergen Reactions     No Known Allergies          Current Outpatient Medications:      mometasone-formoterol (DULERA) 200-5 MCG/ACT inhaler, Inhale 2 puffs into the lungs 2 times daily, Disp: 3 Inhaler, Rfl: 3     tamsulosin (FLOMAX) 0.4 MG capsule, Take 1 capsule (0.4 mg) by mouth daily for 10 days, Disp: 10 capsule, Rfl: 0     valsartan-hydrochlorothiazide (DIOVAN HCT) 160-25 MG tablet, Take 1 tablet by mouth daily Profile Rx: patient will contact pharmacy when needed, Disp: 90 tablet, Rfl: 3     albuterol (PROAIR HFA) 108 (90 Base) MCG/ACT inhaler, Inhale 2 puffs into the lungs every 4 hours as needed for shortness of breath / dyspnea or wheezing (Patient not taking: Reported on 2020), Disp: 1 Inhaler, Rfl: 1     carbamide peroxide (DEBROX) 6.5 % otic solution, Place 5-10 drops into the right ear 2 times daily (Patient not taking: Reported on  "12/14/2020), Disp: 30 mL, Rfl: 0    Review Of Systems:  Skin: No rash, pruritis, or skin pigmentation  Eyes: No changes in vision  Ears/Nose/Throat: No changes in hearing, no nosebleeds  Respiratory: No shortness of breath, dyspnea on exertion, cough, or hemoptysis  Cardiovascular: No chest pain or palpitations  Gastrointestinal: No diarrhea or constipation. No abdominal pain. No hematochezia  Genitourinary: see HPI  Musculoskeletal: No pain or swelling of joints, normal range of motion  Neurologic: No weakness or tremors  Psychiatric: No recent changes in memory or mood  Hematologic/Lymphatic/Immunologic: No easy bruising or enlarged lymph nodes  Endocrine: No weight gain or loss      PHYSICAL EXAM:    /70   Ht 1.651 m (5' 5\")   Wt 63.5 kg (140 lb)   BMI 23.30 kg/m    General appearance: In NAD, conversant  HEENT: Normocephalic and atraumatic, anicteric sclera  Cardiovascular: Not examined  Respiratory: normal, non-labored breathing  Gastrointestinal: negative, Abdomen soft, non-tender, and non-distended.   Musculoskeletal: Not Examined  Peripheral Vascular/extremity: No peripheral edema  Skin: Normal temperature, turgor, and texture. No rash  Psychiatric: Appropriate affect, alert and oriented to person, place, and time    Penis: Normal  Scrotal skin: Normal, no lesions  Testicles: Normal to palpation bilaterally  Epididymis: Normal to palpation bilaterally  Lymphatic: Normal inguinal lymph nodes  Digital Rectal Exam: The prostate is very enlarged, benign and symmetric to palpation    Cystoscopy: I performed flexible cystoscopy and the bladder showed trabeculation but otherwise was normal throughout.  The prostate showed substantial enlargement with a large intravesical segment.  In particular the left lateral lobe was enlarged and protruding into the bladder.  I filled the bladder with 300 mL of water and he had a strong urge to urinate.  I removed the scope.  He was unable to urinate      PSA:     UA " RESULTS:  Recent Labs   Lab Test 12/09/20  1509   COLOR Yellow   APPEARANCE Slightly Cloudy   URINEGLC Negative   URINEBILI Negative   URINEKETONE Negative   SG 1.023   UBLD Large*   URINEPH 6.5   PROTEIN 70*   NITRITE Negative   LEUKEST Small*   RBCU >182*   WBCU 4       Bladder Scan:     Other Labs:      Imaging Studies: None      CLINICAL IMPRESSION:   Enlarged prostate, urinary retention    PLAN:   He has a very large prostate which has resulted in urinary retention.  He continues to be in retention today.  He has been taking Flomax, but only for 1 week at this time.  We discussed his options.  We discussed adding more medication as well as surgical treatment options.  At this time we will give the Flomax more time to work and I also added finasteride to be taken daily to his treatment regimen.  In the meantime we discussed keeping a Sandoval catheter in versus self intermittent catheterization.  We discussed the pros and cons of each option and he wishes to leave an indwelling Sandoval.  Sandoval catheter was replaced today.  I will see him back in 4 weeks for another catheter removal and trial of void.      Philippe Hernandez MD

## 2020-12-14 NOTE — PROGRESS NOTES
[facility]  Main Office: [address]  [address]  [place] [address]       CHIEF COMPLAINT:  Urinary retention    HISTORY:   I was asked by Dr. Marin Duran to see this 80-year-old gentleman who presented with urinary retention 1 week ago.  He had a Sandoval catheter placed for over 1000 mL of output.  His urinalysis at the time did show 12 red blood cells per high-powered field but otherwise was negative for infection.  He was started on Flomax.  He had one episode of hematuria last week after he was active and went for a long walk with a Sandoval in place but otherwise he has done well.    I saw him previously in 2011 when his PSA was elevated at 7.4.  He underwent prostate biopsy at that time that showed an 80 g prostate but no prostate cancer was present.  He did not follow-up with me again after that time but it does appear that his PSA went down after that biopsy.  His final PSA was 5.58 in 2014.    In light of his recent history of retention I recommended a cystoscopy in clinic today.      PAST MEDICAL HISTORY:   Past Medical History:   Diagnosis Date     Brachial neuritis or radiculitis NOS     LUE weakness >> R UE      Cleft palate     multiple surgical repairs     Hernia, abdominal      History of spinal cord injury      Hypertension      Hypertrophy of prostate without urinary obstruction and other lower urinary tract symptoms (LUTS)      Obstructive chronic bronchitis with exacerbation (H) 1995     Squamous cell carcinoma of skin of scalp 11/2016     Unspecified cataract     left     Unspecified tinnitus        PAST SURGICAL HISTORY:   Past Surgical History:   Procedure Laterality Date     COLONOSCOPY N/A 11/18/2014    Procedure: COLONOSCOPY;  Surgeon: Clay Henley MD;  Location:  GI     EYE SURGERY      catarct removal      HC COLONOSCOPY THRU STOMA, DIAGNOSTIC  2002; 2006    benign polyps in 2006     HERNIA REPAIR      1955     INSERT APPLIANCE CLEFT PALATE  2000    multiple revisions.       VASECTOMY       ZZC NONSPECIFIC PROCEDURE  2001    cervical disk 4th plating       FAMILY HISTORY:   Family History   Problem Relation Age of Onset     C.A.D. Mother         CABG     Diabetes Maternal Uncle      C.A.D. Maternal Uncle         MI     Hypertension Maternal Uncle      Cancer - colorectal No family hx of        SOCIAL HISTORY:   Social History     Tobacco Use     Smoking status: Former Smoker     Quit date: 1980     Years since quittin.9     Smokeless tobacco: Never Used     Tobacco comment: Pipie smoker, quit  max 1/2ppd cigarettes since    Substance Use Topics     Alcohol use: Yes     Alcohol/week: 1.7 standard drinks     Types: 2 Standard drinks or equivalent per week     Comment: 1 drink a week          Allergies   Allergen Reactions     No Known Allergies          Current Outpatient Medications:      mometasone-formoterol (DULERA) 200-5 MCG/ACT inhaler, Inhale 2 puffs into the lungs 2 times daily, Disp: 3 Inhaler, Rfl: 3     tamsulosin (FLOMAX) 0.4 MG capsule, Take 1 capsule (0.4 mg) by mouth daily for 10 days, Disp: 10 capsule, Rfl: 0     valsartan-hydrochlorothiazide (DIOVAN HCT) 160-25 MG tablet, Take 1 tablet by mouth daily Profile Rx: patient will contact pharmacy when needed, Disp: 90 tablet, Rfl: 3     albuterol (PROAIR HFA) 108 (90 Base) MCG/ACT inhaler, Inhale 2 puffs into the lungs every 4 hours as needed for shortness of breath / dyspnea or wheezing (Patient not taking: Reported on 2020), Disp: 1 Inhaler, Rfl: 1     carbamide peroxide (DEBROX) 6.5 % otic solution, Place 5-10 drops into the right ear 2 times daily (Patient not taking: Reported on 2020), Disp: 30 mL, Rfl: 0    Review Of Systems:  Skin: No rash, pruritis, or skin pigmentation  Eyes: No changes in vision  Ears/Nose/Throat: No changes in hearing, no nosebleeds  Respiratory: No shortness of breath, dyspnea on exertion, cough, or hemoptysis  Cardiovascular: No chest pain or  "palpitations  Gastrointestinal: No diarrhea or constipation. No abdominal pain. No hematochezia  Genitourinary: see HPI  Musculoskeletal: No pain or swelling of joints, normal range of motion  Neurologic: No weakness or tremors  Psychiatric: No recent changes in memory or mood  Hematologic/Lymphatic/Immunologic: No easy bruising or enlarged lymph nodes  Endocrine: No weight gain or loss      PHYSICAL EXAM:    /70   Ht 1.651 m (5' 5\")   Wt 63.5 kg (140 lb)   BMI 23.30 kg/m    General appearance: In NAD, conversant  HEENT: Normocephalic and atraumatic, anicteric sclera  Cardiovascular: Not examined  Respiratory: normal, non-labored breathing  Gastrointestinal: negative, Abdomen soft, non-tender, and non-distended.   Musculoskeletal: Not Examined  Peripheral Vascular/extremity: No peripheral edema  Skin: Normal temperature, turgor, and texture. No rash  Psychiatric: Appropriate affect, alert and oriented to person, place, and time    Penis: Normal  Scrotal skin: Normal, no lesions  Testicles: Normal to palpation bilaterally  Epididymis: Normal to palpation bilaterally  Lymphatic: Normal inguinal lymph nodes  Digital Rectal Exam: The prostate is very enlarged, benign and symmetric to palpation    Cystoscopy: I performed flexible cystoscopy and the bladder showed trabeculation but otherwise was normal throughout.  The prostate showed substantial enlargement with a large intravesical segment.  In particular the left lateral lobe was enlarged and protruding into the bladder.  I filled the bladder with 300 mL of water and he had a strong urge to urinate.  I removed the scope.  He was unable to urinate      PSA:     UA RESULTS:  Recent Labs   Lab Test 12/09/20  1509   COLOR Yellow   APPEARANCE Slightly Cloudy   URINEGLC Negative   URINEBILI Negative   URINEKETONE Negative   SG 1.023   UBLD Large*   URINEPH 6.5   PROTEIN 70*   NITRITE Negative   LEUKEST Small*   RBCU >182*   WBCU 4       Bladder Scan:     Other Labs: "      Imaging Studies: None      CLINICAL IMPRESSION:   Enlarged prostate, urinary retention    PLAN:   He has a very large prostate which has resulted in urinary retention.  He continues to be in retention today.  He has been taking Flomax, but only for 1 week at this time.  We discussed his options.  We discussed adding more medication as well as surgical treatment options.  At this time we will give the Flomax more time to work and I also added finasteride to be taken daily to his treatment regimen.  In the meantime we discussed keeping a Sandoval catheter in versus self intermittent catheterization.  We discussed the pros and cons of each option and he wishes to leave an indwelling Sandoval.  Sandoval catheter was replaced today.  I will see him back in 4 weeks for another catheter removal and trial of void.      Philippe Hernandez MD

## 2020-12-14 NOTE — NURSING NOTE
Chief Complaint   Patient presents with     Urinary Retention     Patient's catheter was disconnected from the leg bag and the balloon deflated. The catheter was removed with no complaints offered by the patient and the procedure was tolerated well.      Prior to the start of the procedure and with procedural staff participation, I verbally confirmed the patient s identity using two indicators, relevant allergies, that the procedure was appropriate and matched the consent or emergent situation, and that the correct equipment/implants were available. Immediately prior to starting the procedure I conducted the Time Out with the procedural staff and re-confirmed the patient s name, procedure, and site/side. I have wiped the patient off with the povidone-Iodine solution, draped them, and used Lidocaine hydrochloride jelly. (The Joint Commission universal protocol was followed.)  Yes    Sedation (Moderate or Deep): None    5mL 2% lidocaine hydrochloride Urojet instilled into urethra.    NDC# [address]-9  Lot #: [id number]  Expiration Date:  07/22    5mL 2% lidocaine hydrochloride Urojet instilled into urethra.    NDC# [address]-0  Lot #: [id number]  Expiration Date:  07/22      During cystoscopy,  300 mL of sterile water was instilled via gravity into the bladder.    Patient voided 50 mL, new Sandoval was placed.    Using sterile field technique a sterile, well lubricated 18 Uzbek Sandoval coude catheter was gently inserted with ease x 1 attempt.  The balloon was filled with 8 ml sterile water.  No discomfort was voiced by the patient.  Clear, yellow urine return from the catheter was noted.  Sterile tubing and drainage bag were attached to the catheter and secured to the thigh. Patient to follow up in approximately one month.      Katherine Hernandez, EMT

## 2020-12-14 NOTE — PATIENT INSTRUCTIONS

## 2020-12-21 NOTE — TELEPHONE ENCOUNTER
Pt currently with a louise catheter in place due to urinary retention related to enlarged prostate.  Will hold of on urine microalbuminuria as a result of indwelling louise catheter.     Orders placed.

## 2021-01-13 ENCOUNTER — OFFICE VISIT (OUTPATIENT)
Dept: UROLOGY | Facility: CLINIC | Age: 81
End: 2021-01-13
Payer: COMMERCIAL

## 2021-01-13 VITALS
WEIGHT: 145 LBS | DIASTOLIC BLOOD PRESSURE: 90 MMHG | BODY MASS INDEX: 24.16 KG/M2 | SYSTOLIC BLOOD PRESSURE: 130 MMHG | HEIGHT: 65 IN

## 2021-01-13 DIAGNOSIS — Z79.2 PROPHYLACTIC ANTIBIOTIC: Primary | ICD-10-CM

## 2021-01-13 DIAGNOSIS — N40.0 ENLARGED PROSTATE: ICD-10-CM

## 2021-01-13 DIAGNOSIS — Z51.81 ENCOUNTER FOR MONITORING DIURETIC THERAPY: ICD-10-CM

## 2021-01-13 DIAGNOSIS — Z79.899 ENCOUNTER FOR MONITORING DIURETIC THERAPY: ICD-10-CM

## 2021-01-13 DIAGNOSIS — E78.5 HYPERLIPIDEMIA LDL GOAL <130: ICD-10-CM

## 2021-01-13 DIAGNOSIS — I10 HYPERTENSION GOAL BP (BLOOD PRESSURE) < 140/90: ICD-10-CM

## 2021-01-13 DIAGNOSIS — R73.9 ELEVATED BLOOD SUGAR LEVEL: ICD-10-CM

## 2021-01-13 DIAGNOSIS — R33.9 URINARY RETENTION: ICD-10-CM

## 2021-01-13 LAB
ALBUMIN SERPL-MCNC: 3.6 G/DL (ref 3.4–5)
ALP SERPL-CCNC: 57 U/L (ref 40–150)
ALT SERPL W P-5'-P-CCNC: 19 U/L (ref 0–70)
ANION GAP SERPL CALCULATED.3IONS-SCNC: 6 MMOL/L (ref 3–14)
AST SERPL W P-5'-P-CCNC: 18 U/L (ref 0–45)
BILIRUB SERPL-MCNC: 0.2 MG/DL (ref 0.2–1.3)
BUN SERPL-MCNC: 26 MG/DL (ref 7–30)
CALCIUM SERPL-MCNC: 8.8 MG/DL (ref 8.5–10.1)
CHLORIDE SERPL-SCNC: 104 MMOL/L (ref 94–109)
CHOLEST SERPL-MCNC: 150 MG/DL
CO2 SERPL-SCNC: 27 MMOL/L (ref 20–32)
CREAT SERPL-MCNC: 0.84 MG/DL (ref 0.66–1.25)
GFR SERPL CREATININE-BSD FRML MDRD: 81 ML/MIN/{1.73_M2}
GLUCOSE SERPL-MCNC: 110 MG/DL (ref 70–99)
HBA1C MFR BLD: 6 % (ref 0–5.6)
HDLC SERPL-MCNC: 57 MG/DL
LDLC SERPL CALC-MCNC: 80 MG/DL
NONHDLC SERPL-MCNC: 93 MG/DL
POTASSIUM SERPL-SCNC: 3.8 MMOL/L (ref 3.4–5.3)
PROT SERPL-MCNC: 7.5 G/DL (ref 6.8–8.8)
SODIUM SERPL-SCNC: 137 MMOL/L (ref 133–144)
TRIGL SERPL-MCNC: 65 MG/DL

## 2021-01-13 PROCEDURE — 83036 HEMOGLOBIN GLYCOSYLATED A1C: CPT | Performed by: INTERNAL MEDICINE

## 2021-01-13 PROCEDURE — 36415 COLL VENOUS BLD VENIPUNCTURE: CPT | Performed by: INTERNAL MEDICINE

## 2021-01-13 PROCEDURE — 80053 COMPREHEN METABOLIC PANEL: CPT | Performed by: INTERNAL MEDICINE

## 2021-01-13 PROCEDURE — 99213 OFFICE O/P EST LOW 20 MIN: CPT | Performed by: UROLOGY

## 2021-01-13 PROCEDURE — 80061 LIPID PANEL: CPT | Performed by: INTERNAL MEDICINE

## 2021-01-13 RX ORDER — CIPROFLOXACIN 500 MG/1
500 TABLET, FILM COATED ORAL ONCE
Qty: 1 TABLET | Refills: 0 | Status: SHIPPED | OUTPATIENT
Start: 2021-01-13 | End: 2021-01-13

## 2021-01-13 ASSESSMENT — MIFFLIN-ST. JEOR: SCORE: 1284.6

## 2021-01-13 ASSESSMENT — PAIN SCALES - GENERAL: PAINLEVEL: NO PAIN (0)

## 2021-01-13 NOTE — PROGRESS NOTES
Office Visit Note  [facility]  [phone]    UROLOGIC DIAGNOSES:   Urinary retention    CURRENT INTERVENTIONS:   Flomax, finasteride, Sandoval catheter    HISTORY:   Deepak returns to clinic today for another Sandoval catheter removal and trial of void.  We filled his bladder with 250 mL of water and removed the Sandoval catheter.  He was able to void 150 mL of the water without difficulty.    PAST MEDICAL HISTORY:   Past Medical History:   Diagnosis Date     Brachial neuritis or radiculitis NOS     LUE weakness >> R UE      Cleft palate     multiple surgical repairs     Hernia, abdominal      History of spinal cord injury      Hypertension      Hypertrophy of prostate without urinary obstruction and other lower urinary tract symptoms (LUTS)      Obstructive chronic bronchitis with exacerbation (H) 1995     Squamous cell carcinoma of skin of scalp 11/2016     Unspecified cataract     left     Unspecified tinnitus        PAST SURGICAL HISTORY:   Past Surgical History:   Procedure Laterality Date     COLONOSCOPY N/A 11/18/2014    Procedure: COLONOSCOPY;  Surgeon: Clay Henley MD;  Location:  GI     EYE SURGERY      catarct removal      HC COLONOSCOPY THRU STOMA, DIAGNOSTIC  2002; 2006    benign polyps in 2006     HERNIA REPAIR      1955     INSERT APPLIANCE CLEFT PALATE  2000    multiple revisions.      VASECTOMY       ZZC NONSPECIFIC PROCEDURE  2001    cervical disk 4th plating       FAMILY HISTORY:   Family History   Problem Relation Age of Onset     C.A.D. Mother         CABG     Diabetes Maternal Uncle      C.A.D. Maternal Uncle         MI     Hypertension Maternal Uncle      Cancer - colorectal No family hx of        SOCIAL HISTORY:   Social History     Socioeconomic History     Marital status:      Spouse name: Not on file     Number of children: Not on file     Years of education: Not on file     Highest education level: Not on file   Occupational History     Not on file   Social Needs      Financial resource strain: Not on file     Food insecurity     Worry: Not on file     Inability: Not on file     Transportation needs     Medical: Not on file     Non-medical: Not on file   Tobacco Use     Smoking status: Former Smoker     Quit date: 1980     Years since quittin.0     Smokeless tobacco: Never Used     Tobacco comment: Pipie smoker, quit  max 1/2ppd cigarettes since    Substance and Sexual Activity     Alcohol use: Yes     Alcohol/week: 1.7 standard drinks     Types: 2 Standard drinks or equivalent per week     Comment: 1 drink a week     Drug use: No     Sexual activity: Not Currently   Lifestyle     Physical activity     Days per week: Not on file     Minutes per session: Not on file     Stress: Not on file   Relationships     Social connections     Talks on phone: Not on file     Gets together: Not on file     Attends [employer] service: Not on file     Active member of club or organization: Not on file     Attends meetings of clubs or organizations: Not on file     Relationship status: Not on file     Intimate partner violence     Fear of current or ex partner: Not on file     Emotionally abused: Not on file     Physically abused: Not on file     Forced sexual activity: Not on file   Other Topics Concern     Parent/sibling w/ CABG, MI or angioplasty before 65F 55M? No   Social History Narrative     Not on file       Review Of Systems:  Skin: No rash, pruritis, or skin pigmentation  Eyes: No changes in vision  Ears/Nose/Throat: No changes in hearing, no nosebleeds  Respiratory: No shortness of breath, dyspnea on exertion, cough, or hemoptysis  Cardiovascular: No chest pain or palpitations  Gastrointestinal: No diarrhea or constipation. No abdominal pain. No hematochezia  Genitourinary: see HPI  Musculoskeletal: No pain or swelling of joints, normal range of motion  Neurologic: No weakness or tremors  Psychiatric: No recent changes in memory or  mood  Hematologic/Lymphatic/Immunologic: No easy bruising or enlarged lymph nodes  Endocrine: No weight gain or loss      PHYSICAL EXAM:    There were no vitals taken for this visit.    Constitutional: Well developed. Conversant and in no acute distress  Eyes: Anicteric sclera, conjunctiva clear, normal extraocular movements  ENT: Normocephalic and atraumatic,   Skin: Warm and dry. No rashes or lesions  Cardiac: No peripheral edema  Back/Flank: Not done  CNS/PNS: Normal musculature and movements, moves all extremities normally  Respiratory: Normal non-labored breathing  Abdomen: Soft nontender and nondistended  Peripheral Vascular: No peripheral edema  Mental Status/Psych: Alert and Oriented x 3. Normal mood and affect    Penis: Not done  Scrotal Skin: Not done  Testicles: Not done  Epididymis: Not done  Digital Rectal Exam:     Cystoscopy: Not done    Imaging: None    Urinalysis: UA RESULTS:  Recent Labs   Lab Test 12/09/20  1509   COLOR Yellow   APPEARANCE Slightly Cloudy   URINEGLC Negative   URINEBILI Negative   URINEKETONE Negative   SG 1.023   UBLD Large*   URINEPH 6.5   PROTEIN 70*   NITRITE Negative   LEUKEST Small*   RBCU >182*   WBCU 4       PSA:     Post Void Residual:     Other labs: None today      IMPRESSION:  Urinary retention now resolved, enlarged prostate,    PLAN:  He is doing much better today and he is urinating on trial of void today.  I instructed to drink large amounts of water today and contact our clinic if he has difficulty voiding.  Otherwise he will resume the Flomax and the finasteride.  I will follow him closely and see him back for a urinalysis and bladder scan in 2 weeks.      Philippe Hernandez M.D.

## 2021-01-14 ENCOUNTER — ALLIED HEALTH/NURSE VISIT (OUTPATIENT)
Dept: UROLOGY | Facility: CLINIC | Age: 81
End: 2021-01-14
Payer: COMMERCIAL

## 2021-01-14 DIAGNOSIS — R33.9 URINARY RETENTION: Primary | ICD-10-CM

## 2021-01-14 LAB — RESIDUAL VOLUME (RV) (EXTERNAL): 538

## 2021-01-14 PROCEDURE — 51798 US URINE CAPACITY MEASURE: CPT

## 2021-01-14 RX ORDER — LIDOCAINE HYDROCHLORIDE 20 MG/ML
JELLY TOPICAL ONCE
Status: COMPLETED | OUTPATIENT
Start: 2021-01-14 | End: 2021-01-14

## 2021-01-14 RX ADMIN — LIDOCAINE HYDROCHLORIDE: 20 JELLY TOPICAL at 10:49

## 2021-01-14 NOTE — PROGRESS NOTES
Deepak Mckay comes into clinic today because he has been unable to urinate much from late last night through this morning, after having his catheter removed yesterday.    He was urinating ok through the afternoon yesterday and then starting in the evening he has been going very frequently and unable to void much volume at a time.    PVR: 538 mL    5mL 2% lidocaine hydrochloride Urojet instilled into urethra.    NDC# [address]-9  Lot #: [id number]  Expiration Date:  07/22    Using sterile field technique a sterile, well lubricated 18 [place]atian Sandoval coude catheter was gently inserted with ease x 1 attempt.  The balloon was filled with 8 ml sterile water.  No discomfort was voiced by the patient.  Clear, yellow urine return from the catheter was noted.      Drained 675 mL of urine from the bladder.    Sterile tubing and drainage bag were attached to the catheter and secured to the thigh.     Patient will follow up with Dr. Hernandez at this next scheduled appt. On 2/2/21.    This service provided today was under the supervising provider of the day Dr. Diaz, who was available if needed.    Katherine Hernandez, EMT

## 2021-01-20 ENCOUNTER — OFFICE VISIT (OUTPATIENT)
Dept: FAMILY MEDICINE | Facility: CLINIC | Age: 81
End: 2021-01-20
Payer: COMMERCIAL

## 2021-01-20 VITALS
HEIGHT: 65 IN | DIASTOLIC BLOOD PRESSURE: 56 MMHG | RESPIRATION RATE: 18 BRPM | HEART RATE: 79 BPM | TEMPERATURE: 97.5 F | WEIGHT: 142.5 LBS | SYSTOLIC BLOOD PRESSURE: 118 MMHG | OXYGEN SATURATION: 96 % | BODY MASS INDEX: 23.74 KG/M2

## 2021-01-20 DIAGNOSIS — N13.8 BENIGN PROSTATIC HYPERPLASIA WITH URINARY OBSTRUCTION: ICD-10-CM

## 2021-01-20 DIAGNOSIS — N40.1 BENIGN PROSTATIC HYPERPLASIA WITH URINARY OBSTRUCTION: ICD-10-CM

## 2021-01-20 DIAGNOSIS — J42 CHRONIC BRONCHITIS, UNSPECIFIED CHRONIC BRONCHITIS TYPE (H): ICD-10-CM

## 2021-01-20 DIAGNOSIS — I10 HYPERTENSION GOAL BP (BLOOD PRESSURE) < 140/90: ICD-10-CM

## 2021-01-20 DIAGNOSIS — Z00.00 ENCOUNTER FOR MEDICARE ANNUAL WELLNESS EXAM: Primary | ICD-10-CM

## 2021-01-20 DIAGNOSIS — Z97.4 WEARS HEARING AID IN BOTH EARS: ICD-10-CM

## 2021-01-20 DIAGNOSIS — R73.09 ELEVATED HEMOGLOBIN A1C: ICD-10-CM

## 2021-01-20 DIAGNOSIS — Z97.8 FOLEY CATHETER PRESENT: ICD-10-CM

## 2021-01-20 PROCEDURE — 99214 OFFICE O/P EST MOD 30 MIN: CPT | Mod: 25 | Performed by: INTERNAL MEDICINE

## 2021-01-20 PROCEDURE — 99397 PER PM REEVAL EST PAT 65+ YR: CPT | Performed by: INTERNAL MEDICINE

## 2021-01-20 RX ORDER — VALSARTAN AND HYDROCHLOROTHIAZIDE 160; 25 MG/1; MG/1
1 TABLET ORAL DAILY
Qty: 90 TABLET | Refills: 3 | Status: SHIPPED | OUTPATIENT
Start: 2021-01-20 | End: 2022-02-24

## 2021-01-20 ASSESSMENT — ENCOUNTER SYMPTOMS
CHILLS: 0
COUGH: 1
CONSTIPATION: 0
ABDOMINAL PAIN: 0
DIARRHEA: 0
HEMATURIA: 0
HEMATOCHEZIA: 0
PSYCHIATRIC NEGATIVE: 1

## 2021-01-20 ASSESSMENT — MIFFLIN-ST. JEOR: SCORE: 1277.22

## 2021-01-20 ASSESSMENT — ACTIVITIES OF DAILY LIVING (ADL): CURRENT_FUNCTION: NO ASSISTANCE NEEDED

## 2021-01-20 NOTE — PROGRESS NOTES
"Chief Complaint   Patient presents with     Wellness Visit     COPD     Hypertension         SUBJECTIVE:   Deepak Mckay is a 80 year old male who presents for Preventive Visit.      Patient has been advised of split billing requirements and indicates understanding: Yes   Are you in the first 12 months of your Medicare coverage?  No    Healthy Habits:     In general, how would you rate your overall health?  Good    Frequency of exercise:  6-7 days/week    Duration of exercise:  30-45 minutes    Do you usually eat at least 4 servings of fruit and vegetables a day, include whole grains    & fiber and avoid regularly eating high fat or \"junk\" foods?  Yes    Taking medications regularly:  Yes    Barriers to taking medications:  None    Medication side effects:  None    Ability to successfully perform activities of daily living:  No assistance needed    Home Safety:  No safety concerns identified    Hearing Impairment:  Difficulty following a conversation in a noisy restaurant or crowded room, feel that people are mumbling or not speaking clearly, difficulty following dialogue in the theater, difficult to understand a speaker at a public meeting or Bahai service, need to ask people to speak up or repeat themselves, find that men's voices are easier to understand than woman's, difficulty understanding soft or whispered speech and difficulty understanding speech on the telephone    In the past 6 months, have you been bothered by leaking of urine?  No    In general, how would you rate your overall mental or emotional health?  Excellent      PHQ-2 Total Score: 0    Additional concerns today:  No    Do you feel safe in your environment? Yes    Have you ever done Advance Care Planning? (For example, a Health Directive, POLST, or a discussion with a medical provider or your loved ones about your wishes): Yes, advance care planning is on file.      Fall risk  Fallen 2 or more times in the past year?: No  Any fall with " injury in the past year?: No    Cognitive Screening   1) Repeat 3 items (Leader, Season, Table)    2) Clock draw: NORMAL  3) 3 item recall: Recalls 3 objects  Results: 3 items recalled: COGNITIVE IMPAIRMENT LESS LIKELY    Mini-CogTM Copyright REBEKAH Higgins. Licensed by the author for use in [facility]; reprinted with permission (ghazala@[facility]). All rights reserved.      Do you have sleep apnea, excessive snoring or daytime drowsiness?: no    Reviewed and updated as needed this visit by clinical staff  Tobacco  Allergies  Meds   Med Hx  Surg Hx  Fam Hx  Soc Hx        Reviewed and updated as needed this visit by Provider  Tobacco  Allergies  Meds  Problems  Med Hx  Surg Hx  Fam Hx         Social History     Tobacco Use     Smoking status: Former Smoker     Quit date: 1980     Years since quittin.0     Smokeless tobacco: Never Used     Tobacco comment: Pipie smoker, quit  max 2ppd cigarettes since    Substance Use Topics     Alcohol use: Yes     Alcohol/week: 1.7 standard drinks     Types: 2 Standard drinks or equivalent per week     Comment: 1 drink a week         Alcohol Use 2021   Prescreen: >3 drinks/day or >7 drinks/week? No   Prescreen: >3 drinks/day or >7 drinks/week? -           Hypertension Follow-up      Do you check your blood pressure regularly outside of the clinic? No     Are you following a low salt diet? Yes    Are your blood pressures ever more than 140 on the top number (systolic) OR more   than 90 on the bottom number (diastolic), for example 140/90? Are not being checked     COPD Follow-Up    Overall, how are your COPD symptoms since your last clinic visit?  Better    How much fatigue or shortness of breath do you have when you are walking?  Same as usual    How much shortness of breath do you have when you are resting?  None    How often do you cough? Sometimes    Have you noticed any change in your sputum/phlegm?  No    Have you experienced a recent  fever? No    Please describe how far you can walk without stopping to rest:  1-2 miles    How many flights of stairs are you able to walk up without stopping?  2    Have you had any Emergency Room Visits, Urgent Care Visits, or Hospital Admissions because of your COPD since your last office visit?  No     He is currently using inhaler once per day. 2 inh in AM    History   Smoking Status     Former Smoker     Quit date: 1/1/1980   Smokeless Tobacco     Never Used     Comment: Pipie smoker, quit 1980 max 1/2ppd cigarettes since 1955     Lab Results   Component Value Date    FEV1 1.47 11/21/2013    XSV9YNP 43 11/21/2013         Current providers sharing in care for this patient include:   Patient Care Team:  Gaby Richmond MD as PCP - General (Internal Medicine)  Guido Duran MD as Assigned PCP  Philippe Hernandez MD as Assigned Surgical Provider    The following health maintenance items are reviewed in Epic and correct as of today:  Health Maintenance   Topic Date Due     ANNUAL REVIEW OF HM ORDERS  1940     MEDICARE ANNUAL WELLNESS VISIT  12/16/2020     FALL RISK ASSESSMENT  12/16/2020     DTAP/TDAP/TD IMMUNIZATION (4 - Td) 12/15/2021     ADVANCE CARE PLANNING  12/17/2024     SPIROMETRY  Completed     COPD ACTION PLAN  Completed     PHQ-2  Completed     INFLUENZA VACCINE  Completed     Pneumococcal Vaccine: 65+ Years  Completed     ZOSTER IMMUNIZATION  Completed     Pneumococcal Vaccine: Pediatrics (0 to 5 Years) and At-Risk Patients (6 to 64 Years)  Aged Out     IPV IMMUNIZATION  Aged Out     MENINGITIS IMMUNIZATION  Aged Out     HEPATITIS B IMMUNIZATION  Aged Out     Labs reviewed in EPIC  BP Readings from Last 3 Encounters:   01/20/21 118/56   01/13/21 (!) 130/90   12/14/20 110/70    Wt Readings from Last 3 Encounters:   01/20/21 64.6 kg (142 lb 8 oz)   01/13/21 65.8 kg (145 lb)   12/14/20 63.5 kg (140 lb)                  Patient Active Problem List   Diagnosis     Other  psoriasis     Cataract     Hyperlipidemia LDL goal <130     Pulmonary emphysema (H)     Cleft palate     Hypertension goal BP (blood pressure) < 140/90     Health Care Home     Advance Care Planning     BPH (benign prostatic hyperplasia)     COPD (chronic obstructive pulmonary disease) (H)     History of use of hearing aid in both ears     Sandoval catheter present     Past Surgical History:   Procedure Laterality Date     COLONOSCOPY N/A 2014    Procedure: COLONOSCOPY;  Surgeon: Clay Henley MD;  Location:  GI     EYE SURGERY      catarct removal      HC COLONOSCOPY THRU STOMA, DIAGNOSTIC  ;     benign polyps in      HERNIA REPAIR           INSERT APPLIANCE CLEFT PALATE      multiple revisions.      VASECTOMY       ZZC NONSPECIFIC PROCEDURE      cervical disk 4th plating       Social History     Tobacco Use     Smoking status: Former Smoker     Quit date: 1980     Years since quittin.0     Smokeless tobacco: Never Used     Tobacco comment: Pipie smoker, quit  max 1/2ppd cigarettes since    Substance Use Topics     Alcohol use: Yes     Alcohol/week: 1.7 standard drinks     Types: 2 Standard drinks or equivalent per week     Comment: 1 drink a week     Family History   Problem Relation Age of Onset     C.A.D. Mother         CABG     Diabetes Maternal Uncle      C.A.D. Maternal Uncle         MI     Hypertension Maternal Uncle      Cancer - colorectal No family hx of          Current Outpatient Medications   Medication Sig Dispense Refill     finasteride (PROSCAR) 5 MG tablet Take 1 tablet (5 mg) by mouth daily 90 tablet 3     mometasone-formoterol (DULERA) 200-5 MCG/ACT inhaler Inhale 2 puffs into the lungs 2 times daily 13 g 11     tamsulosin (FLOMAX) 0.4 MG capsule Take 1 capsule (0.4 mg) by mouth daily 90 capsule 3     valsartan-hydrochlorothiazide (DIOVAN HCT) 160-25 MG tablet Take 1 tablet by mouth daily Profile Rx: patient will contact pharmacy when needed 90  "tablet 3     Allergies   Allergen Reactions     No Known Allergies      Recent Labs   Lab Test 01/13/21  0759 12/09/20  1501 12/11/19  0803 06/10/19  0824 12/10/18  0939 08/10/16  0929 08/10/16  0929   A1C 6.0*  --   --  5.6  --   --  5.8   LDL 80  --  106*  --  107*   < > 125*   HDL 57  --  60  --  79   < > 75   TRIG 65  --  70  --  56   < > 108   ALT 19  --  18  --  20   < > 17   CR 0.84 0.85 0.80 0.92 0.75   < > 0.78   GFRESTIMATED 81 81 84 78 >90   < > >90  Non  GFR Calc     GFRESTBLACK >90 >90 >90 90 >90   < > >90  African American GFR Calc     POTASSIUM 3.8 3.8 3.7 3.7 3.4   < > 3.7    < > = values in this interval not displayed.        Review of Systems   Constitutional: Negative for chills.   HENT: Positive for congestion.    Respiratory: Positive for cough.         Copd; using inhaler in AM   Cardiovascular: Negative for chest pain.        Hypertension- meds reviewed;    Gastrointestinal: Negative for abdominal pain, constipation, diarrhea and hematochezia.   Endocrine:        Elevated A1C Lab Results       Component                Value               Date                       A1C                      6.0                 01/13/2021                 A1C                      5.6                 06/10/2019             No meds, no monitoring.   Genitourinary: Negative for hematuria.        Enlarged prostate resulting in urinary retention; PVR>1000 in 12/20. Sandoval catheter since 12/20 and on meds: Flomax and Finasteride.  Following with urology.    Psychiatric/Behavioral: Negative.          OBJECTIVE:   /56   Pulse 79   Temp 97.5  F (36.4  C) (Oral)   Resp 18   Ht 1.657 m (5' 5.25\")   Wt 64.6 kg (142 lb 8 oz)   SpO2 96%   BMI 23.53 kg/m   Estimated body mass index is 23.53 kg/m  as calculated from the following:    Height as of this encounter: 1.657 m (5' 5.25\").    Weight as of this encounter: 64.6 kg (142 lb 8 oz).  Physical Exam  GENERAL: healthy, alert and no distress  EYES: Eyes " grossly normal to inspection  HENT: ears with minimal cerumen, no obstruction; scar from  Cleft lip surgery as a child   NECK: no adenopathy, no asymmetry, masses, or scars and thyroid normal to palpation  RESP: lungs clear to auscultation - no rales, rhonchi or wheezes  CV: regular rates and rhythm, normal S1 S2, no S3 or S4, peripheral pulses strong and no peripheral edema  ABDOMEN: soft, nontender, no hepatosplenomegaly, no masses and bowel sounds normal   (male): louise catheter in place; leg bag in place  MS: no gross musculoskeletal defects noted, no edema  NEURO: Normal strength and tone, sensory exam grossly normal, mentation intact, gait normal including heel/toe/tandem walking and Romberg normal  PSYCH: mentation appears normal, affect normal/bright    Diagnostic Test Results:  Labs reviewed in Epic    ASSESSMENT / PLAN:   (Z00.00) Encounter for Medicare annual wellness exam  (primary encounter diagnosis)  Comment: HEALTH CARE MAINTENANCE reviewed.  Plan: reviewed core exercises     (I10) Hypertension goal BP (blood pressure) < 140/90  Comment: BLOOD PRESSURE well controlled; reviewed labs; including diuretic monitoring, renal function, etc.   Plan: valsartan-hydrochlorothiazide (DIOVAN HCT) 160-25 MG tablet          (J42) Chronic bronchitis, unspecified chronic bronchitis type (H)  Comment: Formulary switch to Dulera; has Albuterol available but not needed in over 12 + months  Plan: mometasone-formoterol (DULERA) 200-5 MCG/ACT  inhaler        Pt reports doing well on current in haler; using it in AM; reviewed increasing to BID if develop respiratory symptoms.     (N40.1,  N13.8) Benign prostatic hyperplasia with urinary obstruction  Comment: urinary retention since 12/7/20, failed removal of louise 1/13/21 evaluation reviewed; Flomax and Finasteride used daily;. monitored by Urology. louise in  Plan: meds and Louise per urology; evaluation and treatment per urology. Has follow up    (Z97.8) Louise catheter  "present  Comment: due to urinary retention and enlarged prostate. on meds and due to follow up with urology.   Plan: meds and Sandoval per urology; evaluation and treatment per urology. Has urology follow up scheduled.     (R73.09) Elevated hemoglobin A1c  Comment: prediabetes; A1C 6,0;  manage with diet and exercise. discussed glucose monitoring, he prefers not, OK unless A1C >6.2; will monitor yearly unless much higher.   Lab Results   Component Value Date    A1C 6.0 01/13/2021    A1C 5.6 06/10/2019      healthy diet and regular exercise.  Plan: reassess in one year    (Z97.4) Wears hearing aid in both ears  Comment: hearing aids \"for years\"; minimal cereumen build up  Plan: hearing aids.     Patient has been advised of split billing requirements and indicates understanding: Yes  COUNSELING:  Reviewed preventive health counseling, as reflected in patient instructions       Regular exercise       Healthy diet/nutrition    Estimated body mass index is 23.53 kg/m  as calculated from the following:    Height as of this encounter: 1.657 m (5' 5.25\").    Weight as of this encounter: 64.6 kg (142 lb 8 oz).        He reports that he quit smoking about 41 years ago. He has never used smokeless tobacco.      Appropriate preventive services were discussed with this patient, including applicable screening as appropriate for cardiovascular disease, diabetes, osteopenia/osteoporosis, and glaucoma.  As appropriate for age/gender, discussed screening for colorectal cancer, prostate cancer, breast cancer, and cervical cancer. Checklist reviewing preventive services available has been given to the patient.    Reviewed patients plan of care and provided an AVS. The Complex Care Plan (for patients with higher acuity and needing more deliberate coordination of services) for Deepak meets the Care Plan requirement. This Care Plan has been established and reviewed with the Patient.    Counseling Resources:  ATP IV Guidelines  Pooled Cohorts " Equation Calculator  Breast Cancer Risk Calculator  Breast Cancer: Medication to Reduce Risk  FRAX Risk Assessment  ICSI Preventive Guidelines  Dietary Guidelines for Americans, 2010  [employer]'s MyPlate  ASA Prophylaxis  Lung CA Screening    Gaby Richmond MD  Internal Medicine   [facility]    20 minutes in addition to HEALTH CARE MAINTENANCE are spent with patient, over 50% of that time spent providing counselling, discussing and reviewing medical conditions/concerns, meds and potential side effects.      Identified Health Risks:

## 2021-01-20 NOTE — PATIENT INSTRUCTIONS
Patient Education   Personalized Prevention Plan  You are due for the preventive services outlined below.  Your care team is available to assist you in scheduling these services.  If you have already completed any of these items, please share that information with your care team to update in your medical record.  Health Maintenance Due   Topic Date Due     ANNUAL REVIEW OF HM ORDERS  1940     Annual Wellness Visit  12/16/2020     FALL RISK ASSESSMENT  12/16/2020

## 2021-01-21 ENCOUNTER — TELEPHONE (OUTPATIENT)
Dept: UROLOGY | Facility: CLINIC | Age: 81
End: 2021-01-21

## 2021-01-21 ENCOUNTER — HOSPITAL ENCOUNTER (EMERGENCY)
Facility: CLINIC | Age: 81
Discharge: HOME | End: 2021-01-21
Attending: EMERGENCY MEDICINE | Admitting: EMERGENCY MEDICINE
Payer: COMMERCIAL

## 2021-01-21 VITALS
HEART RATE: 97 BPM | SYSTOLIC BLOOD PRESSURE: 147 MMHG | TEMPERATURE: 97.1 F | DIASTOLIC BLOOD PRESSURE: 94 MMHG | OXYGEN SATURATION: 98 % | RESPIRATION RATE: 18 BRPM

## 2021-01-21 DIAGNOSIS — R33.8 BENIGN PROSTATIC HYPERPLASIA WITH URINARY RETENTION: ICD-10-CM

## 2021-01-21 DIAGNOSIS — N40.1 BENIGN PROSTATIC HYPERPLASIA WITH URINARY RETENTION: ICD-10-CM

## 2021-01-21 DIAGNOSIS — Z46.6 URINARY CATHETER (FOLEY) CHANGE REQUIRED: ICD-10-CM

## 2021-01-21 PROCEDURE — 51702 INSERT TEMP BLADDER CATH: CPT

## 2021-01-21 PROCEDURE — 99284 EMERGENCY DEPT VISIT MOD MDM: CPT | Mod: 25

## 2021-01-21 ASSESSMENT — ENCOUNTER SYMPTOMS
FEVER: 0
ABDOMINAL PAIN: 0
DIFFICULTY URINATING: 1

## 2021-01-21 NOTE — ED TRIAGE NOTES
Pt to ER with c/o accidentally tugging on his catheter now bleeding from penis and catheter not draining

## 2021-01-21 NOTE — TELEPHONE ENCOUNTER
[facility] Call Center    Phone Message    May a detailed message be left on voicemail: yes     Reason for Call: Other: Pt Deepak called reporting he accidentally pulled his catheter out and visited [place] this AM to get reinsertion. Per Pt he was directed to call in, and requests a callback from RN to see if he should come in prior to his 02/01/2021 visit. Please RN call to discuss.     Action Taken: Other: UA Uro    Travel Screening: Not Applicable

## 2021-01-21 NOTE — ED PROVIDER NOTES
History   Chief Complaint:  Catheter Problem       HPI   Deepak Mckay is a 80 year old male with history of hypertension, COPD, with louise catheter in place who presents with catheter problem. The patient states that shortly before arrival, he accidentally got his louise catheter caught on something causing it to tug. He felt immediate pain, which has improved now, and bleeding from the penis. He also states that his catheter has stopped draining. The catheter was replaced one week ago (01/14) by Dr. Hernandez of urology after initial placement on 12/07 by Dr. Duran. He has a follow up appointment on 02/02 to discuss long term plans. The patient is not anticoagulated. Denies any abdominal pain or fever.     Review of Systems   Constitutional: Negative for fever.   Gastrointestinal: Negative for abdominal pain.   Genitourinary: Positive for difficulty urinating. Negative for penile pain.        Penile bleeding   All other systems reviewed and are negative.      Allergies:  No known drug allergies     Medications:  Albuterol   Proscar   Dulera     Past Medical History:    Cleft palate   Spinal cord injury   Hypertension   Brachial neuritis   Obstructive chronic bronchitis   Tinnitus   Cataract   Hernia    Louise catheter   COPD   Pulmonary emphysema     Past Surgical History:    Cataract removal   Benign polyps   Vasectomy   Hernia repair   Insert appliance cleft palate - multiple revisions   Cervical disk 4th plant     Family History:    Mother: coronary artery disease     Social History:  Presents to emergency department alone     Physical Exam     Patient Vitals for the past 24 hrs:   BP Temp Temp src Pulse Resp SpO2   01/21/21 0409 (!) 147/94 97.1  F (36.2  C) Temporal 97 18 98 %       Physical Exam  Constitutional: Alert, attentive, GCS 15  HENT:    Nose: Nose normal.    Mouth/Throat: Oropharynx is clear, mucous membranes are moist  Eyes: EOM are normal, anicteric, conjugate gaze  CV: regular rate and rhythm;  no murmurs  Chest: Effort normal and breath sounds clear without wheezing or rales, symmetric bilaterally   GI:  non tender. No distension. No guarding or rebound. Mild suprapubic tenderness   MSK: No LE edema, no tenderness to palpation of BLE.  Neurological: Alert, attentive, moving all extremities equally.   Skin: Skin is warm and dry.     Emergency Department Course   Imaging:  POC US ABDOMEN LIMITED   Final Result   Abnormal   PROCEDURE NOTE: ED BEDSIDE LIMITED ULTRASOUND   Name of the study: Limited Renal ULT   Performed by: Dr. Limon   Indications: Nondraining Sandoval   Body Location / Organs Imaged: Bladder   Findings: Large distended bladder with Sandoval balloon present in the prostate that extends up into the bladder.   Interpretation: Ultrasound shows acute urinary retention secondary to bladder malfunction after he was retracted down into the prostate.   Archiving of images: Images were also saved digitally to the internal hard drive of the ultrasound machine/PACS.                  Emergency Department Course:    Reviewed:  I reviewed nursing notes, vitals, past medical history and care everywhere    Assessments:  0420 I obtained history and examined the patient as noted above.   0535 I rechecked the patient and explained findings prior to discharge.     Disposition:  The patient was discharged to home.       Impression & Plan   Medical Decision Makin-year-old male presenting for evaluation of accidentally pulling on his Sandoval catheter which she has placed for history of BPH and urinary retention as well as hematuria.  He denies any significant pain but is unable to void, bedside ultrasound shows balloon from his catheter was pulled down into the prostate and is not draining, for this reason Sandoval catheter was exchanged and dark juana urine with small clots was draining freely.  Given traumatic injury, do not suspect urinary tract infection, labs and imaging deferred.  I recommended adequate hydration  to flush his catheter and close follow-up with urology.  He expressed understand this plan and he was discharged.    Diagnosis:    ICD-10-CM    1. Urinary catheter (Sandoval) change required  Z46.6    2. Benign prostatic hyperplasia with urinary retention  N40.1     R33.8      Andrés Limon MD  [employer]  6:20 AM 01/21/21       Scribe Disclosure:  I, Nenita Ellington, am serving as a scribe at 4:10 AM on 1/21/2021 to document services personally performed by Andrés Limon MD based on my observations and the provider's statements to me.           Andrés Limon MD  01/21/21 0620

## 2021-01-21 NOTE — DISCHARGE INSTRUCTIONS
You should call Dr. Hernandez's office later today to let them know you are in the emergency department and had to have your Sandoval replaced after he was accidentally tugged on anterior prostate.  You should drink fluids to stay well-hydrated and help flush the catheter, you can have some bleeding around the catheter however should you note that it stops flowing, you develop increased abdominal pain or fever you should return to the emergency department or call urology.

## 2021-01-21 NOTE — TELEPHONE ENCOUNTER
Returned phone call and spoke with patient he reports blood in his urine and around catheter. He reports when asked that catheter is draining properly and there are no blood clots. Patient was instructed to call our office to get a nurse appointment sooner that upcoming visit with MD. During non-office hours patient is aware to go to ER.  Patient expressed understanding. Patient does report that blood seems to be getting better now.     Eva Duran LPN

## 2021-02-01 ENCOUNTER — OFFICE VISIT (OUTPATIENT)
Dept: UROLOGY | Facility: CLINIC | Age: 81
End: 2021-02-01
Payer: COMMERCIAL

## 2021-02-01 VITALS
HEART RATE: 69 BPM | BODY MASS INDEX: 23.66 KG/M2 | HEIGHT: 65 IN | SYSTOLIC BLOOD PRESSURE: 106 MMHG | OXYGEN SATURATION: 97 % | DIASTOLIC BLOOD PRESSURE: 64 MMHG | WEIGHT: 142 LBS

## 2021-02-01 DIAGNOSIS — R33.9 URINARY RETENTION: Primary | ICD-10-CM

## 2021-02-01 DIAGNOSIS — N40.0 ENLARGED PROSTATE: ICD-10-CM

## 2021-02-01 DIAGNOSIS — R97.20 ELEVATED PROSTATE SPECIFIC ANTIGEN (PSA): ICD-10-CM

## 2021-02-01 PROCEDURE — 99214 OFFICE O/P EST MOD 30 MIN: CPT | Performed by: UROLOGY

## 2021-02-01 ASSESSMENT — PAIN SCALES - GENERAL: PAINLEVEL: NO PAIN (0)

## 2021-02-01 ASSESSMENT — MIFFLIN-ST. JEOR: SCORE: 1274.95

## 2021-02-01 NOTE — PROGRESS NOTES
Office Visit Note  [facility]  [phone]    UROLOGIC DIAGNOSES:   Enlarged prostate, urinary retention    CURRENT INTERVENTIONS:   Flomax, finasteride, Sandoval catheter    HISTORY:   Deepak was unable to urinate again after his last catheter removal so he came back to the clinic and had a catheter reinserted for 675mL of urine retained.  He had 1 episode of dislodging the catheter since that time but otherwise has done well.  The catheter is currently draining clear urine.  He remains on the Flomax and the finasteride.      PAST MEDICAL HISTORY:   Past Medical History:   Diagnosis Date     Brachial neuritis or radiculitis NOS     LUE weakness >> R UE      Cleft palate     multiple surgical repairs     Hernia, abdominal      History of spinal cord injury      Hypertension      Hypertrophy of prostate without urinary obstruction and other lower urinary tract symptoms (LUTS)      Obstructive chronic bronchitis with exacerbation (H) 1995     Squamous cell carcinoma of skin of scalp 11/2016     Unspecified cataract     left     Unspecified tinnitus        PAST SURGICAL HISTORY:   Past Surgical History:   Procedure Laterality Date     COLONOSCOPY N/A 11/18/2014    Procedure: COLONOSCOPY;  Surgeon: Clay Henley MD;  Location:  GI     EYE SURGERY      catarct removal      HC COLONOSCOPY THRU STOMA, DIAGNOSTIC  2002; 2006    benign polyps in 2006     HERNIA REPAIR      1955     INSERT APPLIANCE CLEFT PALATE  2000    multiple revisions.      VASECTOMY       ZZC NONSPECIFIC PROCEDURE  2001    cervical disk 4th plating       FAMILY HISTORY:   Family History   Problem Relation Age of Onset     C.A.D. Mother         CABG     Diabetes Maternal Uncle      C.A.D. Maternal Uncle         MI     Hypertension Maternal Uncle      Cancer - colorectal No family hx of        SOCIAL HISTORY:   Social History     Socioeconomic History     Marital status:      Spouse name: Not on file     Number of children: Not on  file     Years of education: Not on file     Highest education level: Not on file   Occupational History     Not on file   Social Needs     Financial resource strain: Not on file     Food insecurity     Worry: Not on file     Inability: Not on file     Transportation needs     Medical: Not on file     Non-medical: Not on file   Tobacco Use     Smoking status: Former Smoker     Quit date: 1980     Years since quittin.1     Smokeless tobacco: Never Used     Tobacco comment: Pipie smoker, quit  max 1/2ppd cigarettes since    Substance and Sexual Activity     Alcohol use: Yes     Alcohol/week: 1.7 standard drinks     Types: 2 Standard drinks or equivalent per week     Comment: 1 drink a week     Drug use: No     Sexual activity: Not Currently   Lifestyle     Physical activity     Days per week: Not on file     Minutes per session: Not on file     Stress: Not on file   Relationships     Social connections     Talks on phone: Not on file     Gets together: Not on file     Attends Sikh service: Not on file     Active member of club or organization: Not on file     Attends meetings of clubs or organizations: Not on file     Relationship status: Not on file     Intimate partner violence     Fear of current or ex partner: Not on file     Emotionally abused: Not on file     Physically abused: Not on file     Forced sexual activity: Not on file   Other Topics Concern     Parent/sibling w/ CABG, MI or angioplasty before 65F 55M? No   Social History Narrative     Not on file       Review Of Systems:  Skin: No rash, pruritis, or skin pigmentation  Eyes: No changes in vision  Ears/Nose/Throat: No changes in hearing, no nosebleeds  Respiratory: No shortness of breath, dyspnea on exertion, cough, or hemoptysis  Cardiovascular: No chest pain or palpitations  Gastrointestinal: No diarrhea or constipation. No abdominal pain. No hematochezia  Genitourinary: see HPI  Musculoskeletal: No pain or swelling of joints,  "normal range of motion  Neurologic: No weakness or tremors  Psychiatric: No recent changes in memory or mood  Hematologic/Lymphatic/Immunologic: No easy bruising or enlarged lymph nodes  Endocrine: No weight gain or loss      PHYSICAL EXAM:    /64 (BP Location: Right arm)   Pulse 69   Ht 1.657 m (5' 5.25\")   Wt 64.4 kg (142 lb)   SpO2 97%   BMI 23.45 kg/m      Constitutional: Well developed. Conversant and in no acute distress  Eyes: Anicteric sclera, conjunctiva clear, normal extraocular movements  ENT: Normocephalic and atraumatic,   Skin: Warm and dry. No rashes or lesions  Cardiac: No peripheral edema  Back/Flank: Not done  CNS/PNS: Normal musculature and movements, moves all extremities normally  Respiratory: Normal non-labored breathing  Abdomen: Soft nontender and nondistended  Peripheral Vascular: No peripheral edema  Mental Status/Psych: Alert and Oriented x 3. Normal mood and affect    Penis: Not done  Scrotal Skin: Not done  Testicles: Not done  Epididymis: Not done  Digital Rectal Exam:     Cystoscopy: Not done    Imaging: None    Urinalysis: UA RESULTS:  Recent Labs   Lab Test 12/09/20  1509   COLOR Yellow   APPEARANCE Slightly Cloudy   URINEGLC Negative   URINEBILI Negative   URINEKETONE Negative   SG 1.023   UBLD Large*   URINEPH 6.5   PROTEIN 70*   NITRITE Negative   LEUKEST Small*   RBCU >182*   WBCU 4       PSA: 5.58    Post Void Residual:     Other labs: None today      IMPRESSION:  Enlarged prostate, urinary retention    PLAN:  Unfortunately, he continues to be in urinary retention.  He has voided small amounts with catheter removals but has ultimately gone back into retention.  We discussed his options.  Self-intermittent catheterization would not be an option for him as his manual dexterity is fairly poor.  His options then would be to proceed with surgery such as TURP or to leave a Sandoval catheter in place with a future trial of void.  We discussed TURP in detail today along with its " risks and expected recovery.  All of his questions were answered.  He would like to give the medicine more time to work and see if he is able to urinate better in the future, but he may consider surgery in the future.    We will have Deepak come back to the clinic when he is due for his next catheter exchange for an a.m. appointment to have his catheter removed.  He will then see a provider in the afternoon for urinalysis and bladder scan to see if he needs to be catheterized again.      Philippe Hernandez M.D.

## 2021-02-01 NOTE — LETTER
2/1/2021       RE: Deepak Mckay  [address] [place] [address]     Dear Colleague,    Thank you for referring your patient, Deepak Mckay, to the [facility] [place] [facility]. Please see a copy of my visit note below.    Office Visit Note  [facility]  [phone]    UROLOGIC DIAGNOSES:   Enlarged prostate, urinary retention    CURRENT INTERVENTIONS:   Flomax, finasteride, Sandoval catheter    HISTORY:   Deepak was unable to urinate again after his last catheter removal so he came back to the clinic and had a catheter reinserted for 675mL of urine retained.  He had 1 episode of dislodging the catheter since that time but otherwise has done well.  The catheter is currently draining clear urine.  He remains on the Flomax and the finasteride.      PAST MEDICAL HISTORY:   Past Medical History:   Diagnosis Date     Brachial neuritis or radiculitis NOS     LUE weakness >> R UE      Cleft palate     multiple surgical repairs     Hernia, abdominal      History of spinal cord injury      Hypertension      Hypertrophy of prostate without urinary obstruction and other lower urinary tract symptoms (LUTS)      Obstructive chronic bronchitis with exacerbation (H) 1995     Squamous cell carcinoma of skin of scalp 11/2016     Unspecified cataract     left     Unspecified tinnitus        PAST SURGICAL HISTORY:   Past Surgical History:   Procedure Laterality Date     COLONOSCOPY N/A 11/18/2014    Procedure: COLONOSCOPY;  Surgeon: Clay Henley MD;  Location:  GI     EYE SURGERY      catarct removal      HC COLONOSCOPY THRU STOMA, DIAGNOSTIC  2002; 2006    benign polyps in 2006     HERNIA REPAIR      1955     INSERT APPLIANCE CLEFT PALATE  2000    multiple revisions.      VASECTOMY       ZZC NONSPECIFIC PROCEDURE  2001    cervical disk 4th plating       FAMILY HISTORY:   Family History   Problem Relation Age of Onset     C.A.D. Mother          CABG     Diabetes Maternal Uncle      C.A.D. Maternal Uncle         MI     Hypertension Maternal Uncle      Cancer - colorectal No family hx of        SOCIAL HISTORY:   Social History     Socioeconomic History     Marital status:      Spouse name: Not on file     Number of children: Not on file     Years of education: Not on file     Highest education level: Not on file   Occupational History     Not on file   Social Needs     Financial resource strain: Not on file     Food insecurity     Worry: Not on file     Inability: Not on file     Transportation needs     Medical: Not on file     Non-medical: Not on file   Tobacco Use     Smoking status: Former Smoker     Quit date: 1980     Years since quittin.1     Smokeless tobacco: Never Used     Tobacco comment: Pipie smoker, quit  max 1/2ppd cigarettes since    Substance and Sexual Activity     Alcohol use: Yes     Alcohol/week: 1.7 standard drinks     Types: 2 Standard drinks or equivalent per week     Comment: 1 drink a week     Drug use: No     Sexual activity: Not Currently   Lifestyle     Physical activity     Days per week: Not on file     Minutes per session: Not on file     Stress: Not on file   Relationships     Social connections     Talks on phone: Not on file     Gets together: Not on file     Attends Jehovah's witness service: Not on file     Active member of club or organization: Not on file     Attends meetings of clubs or organizations: Not on file     Relationship status: Not on file     Intimate partner violence     Fear of current or ex partner: Not on file     Emotionally abused: Not on file     Physically abused: Not on file     Forced sexual activity: Not on file   Other Topics Concern     Parent/sibling w/ CABG, MI or angioplasty before 65F 55M? No   Social History Narrative     Not on file       Review Of Systems:  Skin: No rash, pruritis, or skin pigmentation  Eyes: No changes in vision  Ears/Nose/Throat: No changes in  "hearing, no nosebleeds  Respiratory: No shortness of breath, dyspnea on exertion, cough, or hemoptysis  Cardiovascular: No chest pain or palpitations  Gastrointestinal: No diarrhea or constipation. No abdominal pain. No hematochezia  Genitourinary: see HPI  Musculoskeletal: No pain or swelling of joints, normal range of motion  Neurologic: No weakness or tremors  Psychiatric: No recent changes in memory or mood  Hematologic/Lymphatic/Immunologic: No easy bruising or enlarged lymph nodes  Endocrine: No weight gain or loss      PHYSICAL EXAM:    /64 (BP Location: Right arm)   Pulse 69   Ht 1.657 m (5' 5.25\")   Wt 64.4 kg (142 lb)   SpO2 97%   BMI 23.45 kg/m      Constitutional: Well developed. Conversant and in no acute distress  Eyes: Anicteric sclera, conjunctiva clear, normal extraocular movements  ENT: Normocephalic and atraumatic,   Skin: Warm and dry. No rashes or lesions  Cardiac: No peripheral edema  Back/Flank: Not done  CNS/PNS: Normal musculature and movements, moves all extremities normally  Respiratory: Normal non-labored breathing  Abdomen: Soft nontender and nondistended  Peripheral Vascular: No peripheral edema  Mental Status/Psych: Alert and Oriented x 3. Normal mood and affect    Penis: Not done  Scrotal Skin: Not done  Testicles: Not done  Epididymis: Not done  Digital Rectal Exam:     Cystoscopy: Not done    Imaging: None    Urinalysis: UA RESULTS:  Recent Labs   Lab Test 12/09/20  1509   COLOR Yellow   APPEARANCE Slightly Cloudy   URINEGLC Negative   URINEBILI Negative   URINEKETONE Negative   SG 1.023   UBLD Large*   URINEPH 6.5   PROTEIN 70*   NITRITE Negative   LEUKEST Small*   RBCU >182*   WBCU 4       PSA: 5.58    Post Void Residual:     Other labs: None today      IMPRESSION:  Enlarged prostate, urinary retention    PLAN:  Unfortunately, he continues to be in urinary retention.  He has voided small amounts with catheter removals but has ultimately gone back into retention.  We " discussed his options.  Self-intermittent catheterization would not be an option for him as his manual dexterity is fairly poor.  His options then would be to proceed with surgery such as TURP or to leave a Sandoval catheter in place with a future trial of void.  We discussed TURP in detail today along with its risks and expected recovery.  All of his questions were answered.  He would like to give the medicine more time to work and see if he is able to urinate better in the future, but he may consider surgery in the future.    We will have Deepak come back to the clinic when he is due for his next catheter exchange for an a.m. appointment to have his catheter removed.  He will then see a provider in the afternoon for urinalysis and bladder scan to see if he needs to be catheterized again.      Philippe Hernandez M.D.

## 2021-02-01 NOTE — NURSING NOTE
Chief Complaint   Patient presents with     Urinary Retention     Has catheter in place      Eva Duran LPN

## 2021-02-08 ENCOUNTER — IMMUNIZATION (OUTPATIENT)
Dept: NURSING | Facility: CLINIC | Age: 81
End: 2021-02-08
Payer: COMMERCIAL

## 2021-02-08 ENCOUNTER — HOSPITAL ENCOUNTER (EMERGENCY)
Facility: CLINIC | Age: 81
Discharge: HOME | End: 2021-02-08
Attending: PHYSICIAN ASSISTANT | Admitting: PHYSICIAN ASSISTANT
Payer: COMMERCIAL

## 2021-02-08 VITALS
OXYGEN SATURATION: 95 % | RESPIRATION RATE: 18 BRPM | DIASTOLIC BLOOD PRESSURE: 127 MMHG | TEMPERATURE: 97 F | HEART RATE: 86 BPM | SYSTOLIC BLOOD PRESSURE: 162 MMHG

## 2021-02-08 DIAGNOSIS — T83.091A OBSTRUCTION OF FOLEY CATHETER, INITIAL ENCOUNTER (H): ICD-10-CM

## 2021-02-08 DIAGNOSIS — R82.90 ABNORMAL URINALYSIS: ICD-10-CM

## 2021-02-08 LAB
ALBUMIN UR-MCNC: 50 MG/DL
APPEARANCE UR: ABNORMAL
BACTERIA #/AREA URNS HPF: ABNORMAL /HPF
BILIRUB UR QL STRIP: NEGATIVE
COLOR UR AUTO: YELLOW
GLUCOSE UR STRIP-MCNC: NEGATIVE MG/DL
HGB UR QL STRIP: ABNORMAL
KETONES UR STRIP-MCNC: NEGATIVE MG/DL
LEUKOCYTE ESTERASE UR QL STRIP: ABNORMAL
NITRATE UR QL: NEGATIVE
PH UR STRIP: 6.5 PH (ref 5–7)
RBC #/AREA URNS AUTO: >182 /HPF (ref 0–2)
SOURCE: ABNORMAL
SP GR UR STRIP: 1.02 (ref 1–1.03)
UROBILINOGEN UR STRIP-MCNC: NORMAL MG/DL (ref 0–2)
WBC #/AREA URNS AUTO: 15 /HPF (ref 0–5)

## 2021-02-08 PROCEDURE — 91300 PR COVID VAC PFIZER DIL RECON 30 MCG/0.3 ML IM: CPT

## 2021-02-08 PROCEDURE — 87088 URINE BACTERIA CULTURE: CPT | Performed by: PHYSICIAN ASSISTANT

## 2021-02-08 PROCEDURE — 51798 US URINE CAPACITY MEASURE: CPT

## 2021-02-08 PROCEDURE — 0001A PR COVID VAC PFIZER DIL RECON 30 MCG/0.3 ML IM: CPT

## 2021-02-08 PROCEDURE — 81001 URINALYSIS AUTO W/SCOPE: CPT | Performed by: PHYSICIAN ASSISTANT

## 2021-02-08 PROCEDURE — 87086 URINE CULTURE/COLONY COUNT: CPT | Performed by: PHYSICIAN ASSISTANT

## 2021-02-08 PROCEDURE — 99284 EMERGENCY DEPT VISIT MOD MDM: CPT | Mod: 25

## 2021-02-08 PROCEDURE — 250N000013 HC RX MED GY IP 250 OP 250 PS 637: Performed by: PHYSICIAN ASSISTANT

## 2021-02-08 RX ORDER — CEPHALEXIN 500 MG/1
500 CAPSULE ORAL 3 TIMES DAILY
Qty: 21 CAPSULE | Refills: 0 | Status: SHIPPED | OUTPATIENT
Start: 2021-02-08 | End: 2021-02-15

## 2021-02-08 RX ORDER — CEPHALEXIN 500 MG/1
500 CAPSULE ORAL ONCE
Status: COMPLETED | OUTPATIENT
Start: 2021-02-08 | End: 2021-02-08

## 2021-02-08 RX ADMIN — CEPHALEXIN 500 MG: 500 CAPSULE ORAL at 22:48

## 2021-02-08 ASSESSMENT — ENCOUNTER SYMPTOMS
FEVER: 0
DIFFICULTY URINATING: 1
NAUSEA: 0
BACK PAIN: 0
VOMITING: 0

## 2021-02-09 NOTE — ED PROVIDER NOTES
History     Chief Complaint:  Catheter Issue     HPI   Deepak Mckay is a 80 year old male with history of hypertension, hyperlipidemia, cleft palate, pulmonary emphysema, psoriasis, who presents for evaluation of catheter issue. The patient reports that today at 1500 he noticed a small amount in his catheter bag which he emptied but tonight did not have any more drainage into the bag. He states that this happens sometimes and he has never had a UTI with it. The patient denies fever, nausea, vomiting, suprapubic pain, abdominal pain, or back pain. He notes that the urine usually does not clog in the bag unless there is a mechanical dislodgement but that this is the first time it has happened with urine clogging spontaneously.  He follows Dr. Hernandez of urology and has an appointment 1 week from today.     Review of Systems   Constitutional: Negative for fever.   Gastrointestinal: Negative for nausea and vomiting.   Genitourinary: Positive for difficulty urinating.   Musculoskeletal: Negative for back pain.   All other systems reviewed and are negative.      Allergies:  No Known Allergies    Medications:  finasteride  mometasone-formoterol inhaler  tamsulosin   valsartan-hydrochlorothiazide    Past Medical History:    Brachial neuritis or radiculitis NOS   Cleft palate   Hernia, abdominal   Spinal cord injury   Hypertension   Hypertrophy of prostate without urinary obstruction and other lower urinary tract symptoms (LUTS)   Obstructive chronic bronchitis with exacerbation   Squamous cell carcinoma of skin of scalp   cataract   tinnitus   Pulmonary emphysema   Hyperlipidemia  Psoriasis     Past Surgical History:    Colonoscopy  Cataract removal   Hernia repair   Insert appliance cleft palate   vasectomy   Cervical disk 4th plating     Family History:    Mother: Coronary Artery Disease  Maternal uncle: diabetes, Coronary Artery Disease, hypertension, MI    Social History:  The patient was unaccompanied to the  ED.  PCP: Gaby Richmond     Physical Exam     Patient Vitals for the past 24 hrs:   BP Temp Pulse Resp SpO2   02/08/21 2045 (!) 162/127 97  F (36.1  C) 86 18 95 %     Physical Exam  General: Alert and cooperative with exam. Non-toxic appearing  Head:  Scalp is NC/AT  Eyes:  Conjunctiva normal, PERRL  ENT:  The external nose and ears are normal.   Neck:  Normal range of motion without rigidity.  CV:  Regular rate and rhythm    No pathologic murmur, rubs, or gallops.  Resp:  Breath sounds are clear bilaterally.  No crackles, wheezes, rhonchi, stridor.    Non-labored, no retractions or accessory muscle use  Abdomen: Abdomen is soft, no distension, no tenderness, no masses. No peritoneal signs. No CVA tenderness.  :  Sandoval catheter in place.  Bag draining small amount of yellow urine.  Some encrustation on tubing.  Initial bladder scan with ~500 ml.  MS:  No lower extremity edema or asymmetric calf swelling. Normal ROM in all joints without effusions.  Skin:  Warm and dry, No rash or lesions noted. 2+ peripheral pulses in all extremities  Neuro:  Alert and oriented x3.  No gross motor deficits.  No facial asymmetry.  Psych: Awake. Alert. Normal affect. Appropriate interactions.      Emergency Department Course     Laboratory:    Labs Ordered and Resulted from Time of ED Arrival Up to the Time of Departure from the ED   ROUTINE UA WITH MICROSCOPIC - Abnormal; Notable for the following components:       Result Value    Blood Urine Large (*)     Protein Albumin Urine 50 (*)     Leukocyte Esterase Urine Moderate (*)     WBC Urine 15 (*)     RBC Urine >182 (*)     Bacteria Urine Few (*)     All other components within normal limits         Emergency Department Course:    Reviewed:    I reviewed the patient's nursing notes, vitals, past medical records, Care Everywhere.     Assessments:    2138 I performed an exam of the patient as documented above.     Interventions:  Medications   cephALEXin (KEFLEX) capsule 500 mg  (500 mg Oral Given 2/8/21 1153)       Disposition:  The patient was discharged to home.     Impression & Plan      Medical Decision Making:  Deepak Mckay is a 80 year old male who presents to the emergency department today for evaluation of catheter problem.  Sandoval catheter in place with signs of clogging and not draining properly with >500 ml in bladder.  Otherwise feels well.  Catheter replaced by nursing and now draining well.  UA difficult to interpret given possibility of chronic colonization, but given possibility that catheter obstruction due to UTI will cover with antibiotics and send culture.  No other evidence of UTI, pyelonephritis, kidney injury, stone, or sepsis.  No history of resistant organisms on review of cultures.  Will start Keflex TID pending cx results and have pt follow-up with Dr. Hernandez in next several days discuss cx results and whether or not to continue or tailor antibiotics.  Return precautions given for any fever, chills, vomiting, pain, cathteter not draining, gross hematuria, etc.    Diagnosis:    ICD-10-CM    1. Obstruction of Sandoval catheter, initial encounter (H)  T83.091A Urine Culture   2. Abnormal urinalysis  R82.90        Discharge Medications:  Discharge Medication List as of 2/8/2021 10:42 PM      START taking these medications    Details   cephALEXin (KEFLEX) 500 MG capsule Take 1 capsule (500 mg) by mouth 3 times daily for 7 days, Disp-21 capsule, R-0, E-Prescribe           Scribe Disclosure:  I, Orla Severson, am serving as a scribe at 9:24 PM on 2/8/2021 to document services personally performed by Pollo Andres PA-C based on my observations and the provider's statements to me.     [facility] EMERGENCY DEPT         Pollo Andres PA-C  02/09/21 1723

## 2021-02-10 ENCOUNTER — TELEPHONE (OUTPATIENT)
Dept: EMERGENCY MEDICINE | Facility: CLINIC | Age: 81
End: 2021-02-10

## 2021-02-10 LAB
BACTERIA SPEC CULT: ABNORMAL
Lab: ABNORMAL
SPECIMEN SOURCE: ABNORMAL

## 2021-02-10 NOTE — TELEPHONE ENCOUNTER
[facility] Emergency Department/Urgent Care Lab result notification:    [place] ED lab result protocol used  Urine culture    Reason for call  Notify of lab results, assess symptoms,  review ED providers recommendations/discharge instructions (if necessary) and advise per ED lab result f/u protocol    Lab Result   Final Urine Culture Report on 2/10/21  Emergency Dept/Urgent Care discharge antibiotic prescribed: Cephalexin (Keflex) 500 mg capsule, 1 capsule (500 mg) by mouth 3 times daily for 7 days.  #1. Bacteria, >100,000 colonies/mL Neisseria species not gonorrhoeae or meningitidis. Susceptibility testing not routinely done   As per [place] ED Lab Result protocol, no change in antibiotic therapy.    Information table from ED Provider visit on 2/10/21  Symptoms reported at ED visit (Chief complaint, HPI) Chief Complaint:  Catheter Issue      HPI   Deepak Mckay is a 80 year old male with history of hypertension, hyperlipidemia, cleft palate, pulmonary emphysema, psoriasis, who presents for evaluation of catheter issue. The patient reports that today at 1500 he noticed a small amount in his catheter bag which he emptied but tonight did not have any more drainage into the bag. He states that this happens sometimes and he has never had a UTI with it. The patient denies fever, nausea, vomiting, suprapubic pain, abdominal pain, or back pain. He notes that the urine usually does not clog in the bag unless there is a mechanical dislodgement but that this is the first time it has happened with urine clogging spontaneously.  He follows Dr. Hernandez of urology and has an appointment 1 week from today.    ED providers Impression and Plan (applicable information) Deepak Mckay is a 80 year old male who presents to the emergency department today for evaluation of catheter problem.  Sandoval catheter in place with signs of clogging and not draining properly with >500 ml in bladder.  Otherwise feels well.  Catheter  replaced by nursing and now draining well.  UA difficult to interpret given possibility of chronic colonization, but given possibility that catheter obstruction due to UTI will cover with antibiotics and send culture.  No other evidence of UTI, pyelonephritis, kidney injury, stone, or sepsis.  No history of resistant organisms on review of cultures.  Will start Keflex TID pending cx results and have pt follow-up with Dr. Hernandez in next several days discuss cx results and whether or not to continue or tailor antibiotics.  Return precautions given for any fever, chills, vomiting, pain, cathteter not draining, gross hematuria, etc.   Miscellaneous information NA     RN Assessment (Patient s current Symptoms), include time called.  [Insert Left message here if message left]  He states she is feeling well.  Has appt with Urology on 2/15  Catheter is draining well.  Taking antibiotic as prescribed  RN Recommendations/Instructions per [place] ED lab result protocol  Patient notified of lab result and treatment recommendations.     Please Contact your PCP clinic or return to the Emergency department if your:    Symptoms worsen or other concerning symptom's.      [RN Name]  Julio Messer RN  [facility]  Emergency Dept Lab Result RN  Ph# [phone]      Copy of Lab result

## 2021-02-10 NOTE — RESULT ENCOUNTER NOTE
Final Urine Culture Report on 2/10/21  Emergency Dept/Urgent Care discharge antibiotic prescribed: Cephalexin (Keflex) 500 mg capsule, 1 capsule (500 mg) by mouth 3 times daily for 7 days.  #1. Bacteria, >100,000 colonies/mL Neisseria species not gonorrhoeae or meningitidis. Susceptibility testing not routinely done   As per [place] ED Lab Result protocol, no change in antibiotic therapy.

## 2021-02-11 DIAGNOSIS — R33.9 URINARY RETENTION: Primary | ICD-10-CM

## 2021-02-15 ENCOUNTER — OFFICE VISIT (OUTPATIENT)
Dept: UROLOGY | Facility: CLINIC | Age: 81
End: 2021-02-15
Payer: COMMERCIAL

## 2021-02-15 VITALS
SYSTOLIC BLOOD PRESSURE: 150 MMHG | HEIGHT: 65 IN | BODY MASS INDEX: 23.32 KG/M2 | WEIGHT: 140 LBS | DIASTOLIC BLOOD PRESSURE: 80 MMHG

## 2021-02-15 VITALS
BODY MASS INDEX: 23.32 KG/M2 | DIASTOLIC BLOOD PRESSURE: 68 MMHG | HEIGHT: 65 IN | WEIGHT: 140 LBS | SYSTOLIC BLOOD PRESSURE: 140 MMHG

## 2021-02-15 DIAGNOSIS — R33.9 URINARY RETENTION: Primary | ICD-10-CM

## 2021-02-15 LAB
ALBUMIN UR-MCNC: NEGATIVE MG/DL
APPEARANCE UR: CLEAR
BILIRUB UR QL STRIP: NEGATIVE
COLOR UR AUTO: YELLOW
GLUCOSE UR STRIP-MCNC: NEGATIVE MG/DL
HGB UR QL STRIP: ABNORMAL
KETONES UR STRIP-MCNC: NEGATIVE MG/DL
LEUKOCYTE ESTERASE UR QL STRIP: NEGATIVE
NITRATE UR QL: NEGATIVE
PH UR STRIP: 6.5 PH (ref 5–7)
RESIDUAL VOLUME (RV) (EXTERNAL): 206
SOURCE: ABNORMAL
SP GR UR STRIP: 1.01 (ref 1–1.03)
UROBILINOGEN UR STRIP-ACNC: 0.2 EU/DL (ref 0.2–1)

## 2021-02-15 PROCEDURE — 99213 OFFICE O/P EST LOW 20 MIN: CPT | Mod: 25 | Performed by: UROLOGY

## 2021-02-15 PROCEDURE — 99207 PR NO CHARGE NURSE ONLY: CPT

## 2021-02-15 PROCEDURE — 81003 URINALYSIS AUTO W/O SCOPE: CPT | Mod: QW | Performed by: UROLOGY

## 2021-02-15 PROCEDURE — 51798 US URINE CAPACITY MEASURE: CPT | Performed by: UROLOGY

## 2021-02-15 ASSESSMENT — MIFFLIN-ST. JEOR
SCORE: 1265.88
SCORE: 1265.88

## 2021-02-15 ASSESSMENT — PAIN SCALES - GENERAL
PAINLEVEL: NO PAIN (0)
PAINLEVEL: NO PAIN (0)

## 2021-02-15 NOTE — LETTER
2/15/2021       RE: Deepak Mckay  [address] [place] [address]     Dear Colleague,    Thank you for referring your patient, Deepak Mckay, to the [facility] UROLOGY CLINIC [place] at [facility]. Please see a copy of my visit note below.    Office Visit Note  [facility]  [phone]    UROLOGIC DIAGNOSES:   Enlarged prostate, urinary retention    CURRENT INTERVENTIONS:   Flomax, finasteride    HISTORY:   Deepak had his catheter removed at 8 AM this morning and he voided 2 times today.  He then came to clinic just now, at 4 PM, and voided again for urine sample.  He says he feels he is voiding well.      PAST MEDICAL HISTORY:   Past Medical History:   Diagnosis Date     Brachial neuritis or radiculitis NOS     LUE weakness >> R UE      Cleft palate     multiple surgical repairs     Hernia, abdominal      History of spinal cord injury      Hypertension      Hypertrophy of prostate without urinary obstruction and other lower urinary tract symptoms (LUTS)      Obstructive chronic bronchitis with exacerbation (H) 1995     Squamous cell carcinoma of skin of scalp 11/2016     Unspecified cataract     left     Unspecified tinnitus        PAST SURGICAL HISTORY:   Past Surgical History:   Procedure Laterality Date     COLONOSCOPY N/A 11/18/2014    Procedure: COLONOSCOPY;  Surgeon: Clay Henley MD;  Location:  GI     EYE SURGERY      catarct removal      HC COLONOSCOPY THRU STOMA, DIAGNOSTIC  2002; 2006    benign polyps in 2006     HERNIA REPAIR      1955     INSERT APPLIANCE CLEFT PALATE  2000    multiple revisions.      VASECTOMY       ZZC NONSPECIFIC PROCEDURE  2001    cervical disk 4th plating       FAMILY HISTORY:   Family History   Problem Relation Age of Onset     C.A.D. Mother         CABG     Diabetes Maternal Uncle      C.A.D. Maternal Uncle         MI     Hypertension Maternal Uncle      Cancer - colorectal No family hx of         SOCIAL HISTORY:   Social History     Socioeconomic History     Marital status:      Spouse name: Not on file     Number of children: Not on file     Years of education: Not on file     Highest education level: Not on file   Occupational History     Not on file   Social Needs     Financial resource strain: Not on file     Food insecurity     Worry: Not on file     Inability: Not on file     Transportation needs     Medical: Not on file     Non-medical: Not on file   Tobacco Use     Smoking status: Former Smoker     Quit date: 1980     Years since quittin.1     Smokeless tobacco: Never Used     Tobacco comment: Pipie smoker, quit  max 1/2ppd cigarettes since    Substance and Sexual Activity     Alcohol use: Yes     Alcohol/week: 1.7 standard drinks     Types: 2 Standard drinks or equivalent per week     Comment: 1 drink a week     Drug use: No     Sexual activity: Not Currently   Lifestyle     Physical activity     Days per week: Not on file     Minutes per session: Not on file     Stress: Not on file   Relationships     Social connections     Talks on phone: Not on file     Gets together: Not on file     Attends Yazdanism service: Not on file     Active member of club or organization: Not on file     Attends meetings of clubs or organizations: Not on file     Relationship status: Not on file     Intimate partner violence     Fear of current or ex partner: Not on file     Emotionally abused: Not on file     Physically abused: Not on file     Forced sexual activity: Not on file   Other Topics Concern     Parent/sibling w/ CABG, MI or angioplasty before 65F 55M? No   Social History Narrative     Not on file       Review Of Systems:  Skin: No rash, pruritis, or skin pigmentation  Eyes: No changes in vision  Ears/Nose/Throat: No changes in hearing, no nosebleeds  Respiratory: No shortness of breath, dyspnea on exertion, cough, or hemoptysis  Cardiovascular: No chest pain or  palpitations  Gastrointestinal: No diarrhea or constipation. No abdominal pain. No hematochezia  Genitourinary: see HPI  Musculoskeletal: No pain or swelling of joints, normal range of motion  Neurologic: No weakness or tremors  Psychiatric: No recent changes in memory or mood  Hematologic/Lymphatic/Immunologic: No easy bruising or enlarged lymph nodes  Endocrine: No weight gain or loss      PHYSICAL EXAM:    There were no vitals taken for this visit.    Constitutional: Well developed. Conversant and in no acute distress  Eyes: Anicteric sclera, conjunctiva clear, normal extraocular movements  ENT: Normocephalic and atraumatic,   Skin: Warm and dry. No rashes or lesions  Cardiac: No peripheral edema  Back/Flank: Not done  CNS/PNS: Normal musculature and movements, moves all extremities normally  Respiratory: Normal non-labored breathing  Abdomen: Soft nontender and nondistended  Peripheral Vascular: No peripheral edema  Mental Status/Psych: Alert and Oriented x 3. Normal mood and affect    Penis: Not done  Scrotal Skin: Not done  Testicles: Not done  Epididymis: Not done  Digital Rectal Exam:     Cystoscopy: Not done    Imaging: None    Urinalysis: UA RESULTS:  Recent Labs   Lab Test 02/08/21  2201   COLOR Yellow   APPEARANCE Slightly Cloudy   URINEGLC Negative   URINEBILI Negative   URINEKETONE Negative   SG 1.018   UBLD Large*   URINEPH 6.5   PROTEIN 50*   NITRITE Negative   LEUKEST Moderate*   RBCU >182*   WBCU 15*       PSA:     Post Void Residual: 206mL    Other labs: None today      IMPRESSION:  Urinary retention, now resolved    PLAN:  He appears to be voiding better today.  He will resume the Flomax and the finasteride.  I recommended that we continue to follow closely.  We will see him back in 1 month for another urinalysis and bladder scan to make certain he continues to void adequately.      Philippe Hernandez M.D.

## 2021-02-15 NOTE — LETTER
2/15/2021       RE: Deepak Mckay  [address] [place] [address]     Dear Colleague,    Thank you for referring your patient, Deepak Mckay, to the [facility] UROLOGY CLINIC [place] at [facility]. Please see a copy of my visit note below.    Deepak Mckay comes into clinic today at the request of Dr. Hernandez Ordering Provider for Catheter removal.    Patient's catheter was disconnected from the leg bag and the balloon deflated. The catheter was removed with no complaints offered by the patient and the procedure was tolerated well.      Patient was instructed to drink plenty of water and try to urinate at home.  He will return to clinic this afternoon at 4pm for UA/PVR.    This service provided today was under the supervising provider of the day Dr. Hernandez, who was available if needed.    Katherine Hernandez, EMT

## 2021-02-15 NOTE — NURSING NOTE
Chief Complaint   Patient presents with     Urinary Retention     PVR: 206 mL    Katherine Hernandez, EMT

## 2021-02-15 NOTE — PROGRESS NOTES
Deepak Mckay comes into clinic today at the request of Dr. Hernandez Ordering Provider for Catheter removal.    Patient's catheter was disconnected from the leg bag and the balloon deflated. The catheter was removed with no complaints offered by the patient and the procedure was tolerated well.      Patient was instructed to drink plenty of water and try to urinate at home.  He will return to clinic this afternoon at 4pm for UA/PVR.    This service provided today was under the supervising provider of the day Dr. Hernandez, who was available if needed.    Katherine Hernandez, EMT

## 2021-02-15 NOTE — PROGRESS NOTES
Office Visit Note  [facility]  [phone]    UROLOGIC DIAGNOSES:   Enlarged prostate, urinary retention    CURRENT INTERVENTIONS:   Flomax, finasteride    HISTORY:   Deepak had his catheter removed at 8 AM this morning and he voided 2 times today.  He then came to clinic just now, at 4 PM, and voided again for urine sample.  He says he feels he is voiding well.      PAST MEDICAL HISTORY:   Past Medical History:   Diagnosis Date     Brachial neuritis or radiculitis NOS     LUE weakness >> R UE      Cleft palate     multiple surgical repairs     Hernia, abdominal      History of spinal cord injury      Hypertension      Hypertrophy of prostate without urinary obstruction and other lower urinary tract symptoms (LUTS)      Obstructive chronic bronchitis with exacerbation (H) 1995     Squamous cell carcinoma of skin of scalp 11/2016     Unspecified cataract     left     Unspecified tinnitus        PAST SURGICAL HISTORY:   Past Surgical History:   Procedure Laterality Date     COLONOSCOPY N/A 11/18/2014    Procedure: COLONOSCOPY;  Surgeon: Clay Henley MD;  Location:  GI     EYE SURGERY      catarct removal      HC COLONOSCOPY THRU STOMA, DIAGNOSTIC  2002; 2006    benign polyps in 2006     HERNIA REPAIR      1955     INSERT APPLIANCE CLEFT PALATE  2000    multiple revisions.      VASECTOMY       ZZC NONSPECIFIC PROCEDURE  2001    cervical disk 4th plating       FAMILY HISTORY:   Family History   Problem Relation Age of Onset     C.A.D. Mother         CABG     Diabetes Maternal Uncle      C.A.D. Maternal Uncle         MI     Hypertension Maternal Uncle      Cancer - colorectal No family hx of        SOCIAL HISTORY:   Social History     Socioeconomic History     Marital status:      Spouse name: Not on file     Number of children: Not on file     Years of education: Not on file     Highest education level: Not on file   Occupational History     Not on file   Social Needs     Financial resource  strain: Not on file     Food insecurity     Worry: Not on file     Inability: Not on file     Transportation needs     Medical: Not on file     Non-medical: Not on file   Tobacco Use     Smoking status: Former Smoker     Quit date: 1980     Years since quittin.1     Smokeless tobacco: Never Used     Tobacco comment: Pipie smoker, quit  max 1/2ppd cigarettes since    Substance and Sexual Activity     Alcohol use: Yes     Alcohol/week: 1.7 standard drinks     Types: 2 Standard drinks or equivalent per week     Comment: 1 drink a week     Drug use: No     Sexual activity: Not Currently   Lifestyle     Physical activity     Days per week: Not on file     Minutes per session: Not on file     Stress: Not on file   Relationships     Social connections     Talks on phone: Not on file     Gets together: Not on file     Attends Faith service: Not on file     Active member of club or organization: Not on file     Attends meetings of clubs or organizations: Not on file     Relationship status: Not on file     Intimate partner violence     Fear of current or ex partner: Not on file     Emotionally abused: Not on file     Physically abused: Not on file     Forced sexual activity: Not on file   Other Topics Concern     Parent/sibling w/ CABG, MI or angioplasty before 65F 55M? No   Social History Narrative     Not on file       Review Of Systems:  Skin: No rash, pruritis, or skin pigmentation  Eyes: No changes in vision  Ears/Nose/Throat: No changes in hearing, no nosebleeds  Respiratory: No shortness of breath, dyspnea on exertion, cough, or hemoptysis  Cardiovascular: No chest pain or palpitations  Gastrointestinal: No diarrhea or constipation. No abdominal pain. No hematochezia  Genitourinary: see HPI  Musculoskeletal: No pain or swelling of joints, normal range of motion  Neurologic: No weakness or tremors  Psychiatric: No recent changes in memory or mood  Hematologic/Lymphatic/Immunologic: No easy bruising  or enlarged lymph nodes  Endocrine: No weight gain or loss      PHYSICAL EXAM:    There were no vitals taken for this visit.    Constitutional: Well developed. Conversant and in no acute distress  Eyes: Anicteric sclera, conjunctiva clear, normal extraocular movements  ENT: Normocephalic and atraumatic,   Skin: Warm and dry. No rashes or lesions  Cardiac: No peripheral edema  Back/Flank: Not done  CNS/PNS: Normal musculature and movements, moves all extremities normally  Respiratory: Normal non-labored breathing  Abdomen: Soft nontender and nondistended  Peripheral Vascular: No peripheral edema  Mental Status/Psych: Alert and Oriented x 3. Normal mood and affect    Penis: Not done  Scrotal Skin: Not done  Testicles: Not done  Epididymis: Not done  Digital Rectal Exam:     Cystoscopy: Not done    Imaging: None    Urinalysis: UA RESULTS:  Recent Labs   Lab Test 02/08/21  2201   COLOR Yellow   APPEARANCE Slightly Cloudy   URINEGLC Negative   URINEBILI Negative   URINEKETONE Negative   SG 1.018   UBLD Large*   URINEPH 6.5   PROTEIN 50*   NITRITE Negative   LEUKEST Moderate*   RBCU >182*   WBCU 15*       PSA:     Post Void Residual: 206mL    Other labs: None today      IMPRESSION:  Urinary retention, now resolved    PLAN:  He appears to be voiding better today.  He will resume the Flomax and the finasteride.  I recommended that we continue to follow closely.  We will see him back in 1 month for another urinalysis and bladder scan to make certain he continues to void adequately.      Philippe Hernandez M.D.

## 2021-03-01 ENCOUNTER — IMMUNIZATION (OUTPATIENT)
Dept: NURSING | Facility: CLINIC | Age: 81
End: 2021-03-01
Attending: INTERNAL MEDICINE
Payer: COMMERCIAL

## 2021-03-01 PROCEDURE — 0002A PR COVID VAC PFIZER DIL RECON 30 MCG/0.3 ML IM: CPT

## 2021-03-01 PROCEDURE — 91300 PR COVID VAC PFIZER DIL RECON 30 MCG/0.3 ML IM: CPT

## 2021-03-16 DIAGNOSIS — N40.0 BPH (BENIGN PROSTATIC HYPERPLASIA): Primary | ICD-10-CM

## 2021-03-17 ENCOUNTER — OFFICE VISIT (OUTPATIENT)
Dept: UROLOGY | Facility: CLINIC | Age: 81
End: 2021-03-17
Payer: COMMERCIAL

## 2021-03-17 VITALS
BODY MASS INDEX: 23.32 KG/M2 | WEIGHT: 140 LBS | HEIGHT: 65 IN | SYSTOLIC BLOOD PRESSURE: 130 MMHG | DIASTOLIC BLOOD PRESSURE: 78 MMHG | HEART RATE: 72 BPM

## 2021-03-17 DIAGNOSIS — N40.1 BENIGN PROSTATIC HYPERPLASIA WITH NOCTURIA: ICD-10-CM

## 2021-03-17 DIAGNOSIS — R35.1 BENIGN PROSTATIC HYPERPLASIA WITH NOCTURIA: ICD-10-CM

## 2021-03-17 LAB
ALBUMIN UR-MCNC: NEGATIVE MG/DL
APPEARANCE UR: CLEAR
BILIRUB UR QL STRIP: NEGATIVE
COLOR UR AUTO: YELLOW
GLUCOSE UR STRIP-MCNC: NEGATIVE MG/DL
HGB UR QL STRIP: ABNORMAL
KETONES UR STRIP-MCNC: NEGATIVE MG/DL
LEUKOCYTE ESTERASE UR QL STRIP: ABNORMAL
NITRATE UR QL: NEGATIVE
PH UR STRIP: 5.5 PH (ref 5–7)
RBC #/AREA URNS AUTO: 0 /HPF (ref 0–2)
RESIDUAL VOLUME (RV) (EXTERNAL): 140
SOURCE: ABNORMAL
SP GR UR STRIP: 1.01 (ref 1–1.03)
SQUAMOUS #/AREA URNS AUTO: <1 /HPF (ref 0–1)
UROBILINOGEN UR STRIP-ACNC: 0.2 EU/DL (ref 0.2–1)
WBC #/AREA URNS AUTO: 5 /HPF (ref 0–5)

## 2021-03-17 PROCEDURE — 51798 US URINE CAPACITY MEASURE: CPT | Performed by: PHYSICIAN ASSISTANT

## 2021-03-17 PROCEDURE — 99213 OFFICE O/P EST LOW 20 MIN: CPT | Mod: 25 | Performed by: PHYSICIAN ASSISTANT

## 2021-03-17 PROCEDURE — 84999 UNLISTED CHEMISTRY PROCEDURE: CPT | Mod: 90 | Performed by: PHYSICIAN ASSISTANT

## 2021-03-17 PROCEDURE — 99000 SPECIMEN HANDLING OFFICE-LAB: CPT | Performed by: PHYSICIAN ASSISTANT

## 2021-03-17 PROCEDURE — 81003 URINALYSIS AUTO W/O SCOPE: CPT | Performed by: PHYSICIAN ASSISTANT

## 2021-03-17 ASSESSMENT — MIFFLIN-ST. JEOR: SCORE: 1261.92

## 2021-03-17 ASSESSMENT — PAIN SCALES - GENERAL: PAINLEVEL: NO PAIN (0)

## 2021-03-17 NOTE — PROGRESS NOTES
Office Visit Note  [facility]  [phone]    UROLOGIC DIAGNOSES:   Enlarged prostate, urinary retention    CURRENT INTERVENTIONS:   Flomax, finasteride    HISTORY:   Pleasant 81 y/o with history of retention, requiring Foleys and cysto with Dr. Hernandez. Now on dual therapy and here for UA, PVR. Feeling great. Improved stream, no gross hematuria. Nocturia x 1-2.     Some urgency and frequency in the AM after coffee.     PAST MEDICAL HISTORY:   Past Medical History:   Diagnosis Date     Brachial neuritis or radiculitis NOS     LUE weakness >> R UE      Cleft palate     multiple surgical repairs     Hernia, abdominal      History of spinal cord injury      Hypertension      Hypertrophy of prostate without urinary obstruction and other lower urinary tract symptoms (LUTS)      Obstructive chronic bronchitis with exacerbation (H) 1995     Squamous cell carcinoma of skin of scalp 11/2016     Unspecified cataract     left     Unspecified tinnitus        PAST SURGICAL HISTORY:   Past Surgical History:   Procedure Laterality Date     COLONOSCOPY N/A 11/18/2014    Procedure: COLONOSCOPY;  Surgeon: Clay Henley MD;  Location:  GI     EYE SURGERY      catarct removal      HC COLONOSCOPY THRU STOMA, DIAGNOSTIC  2002; 2006    benign polyps in 2006     HERNIA REPAIR      1955     INSERT APPLIANCE CLEFT PALATE  2000    multiple revisions.      VASECTOMY       ZZC NONSPECIFIC PROCEDURE  2001    cervical disk 4th plating       FAMILY HISTORY:   Family History   Problem Relation Age of Onset     C.A.D. Mother         CABG     Diabetes Maternal Uncle      C.A.D. Maternal Uncle         MI     Hypertension Maternal Uncle      Cancer - colorectal No family hx of        SOCIAL HISTORY:   Social History     Socioeconomic History     Marital status:      Spouse name: Not on file     Number of children: Not on file     Years of education: Not on file     Highest education level: Not on file   Occupational  "History     Not on file   Social Needs     Financial resource strain: Not on file     Food insecurity     Worry: Not on file     Inability: Not on file     Transportation needs     Medical: Not on file     Non-medical: Not on file   Tobacco Use     Smoking status: Former Smoker     Quit date: 1980     Years since quittin.1     Smokeless tobacco: Never Used     Tobacco comment: Pipie smoker, quit  max 1/2ppd cigarettes since    Substance and Sexual Activity     Alcohol use: Yes     Alcohol/week: 1.7 standard drinks     Types: 2 Standard drinks or equivalent per week     Comment: 1 drink a week     Drug use: No     Sexual activity: Not Currently   Lifestyle     Physical activity     Days per week: Not on file     Minutes per session: Not on file     Stress: Not on file   Relationships     Social connections     Talks on phone: Not on file     Gets together: Not on file     Attends Sabianist service: Not on file     Active member of club or organization: Not on file     Attends meetings of clubs or organizations: Not on file     Relationship status: Not on file     Intimate partner violence     Fear of current or ex partner: Not on file     Emotionally abused: Not on file     Physically abused: Not on file     Forced sexual activity: Not on file   Other Topics Concern     Parent/sibling w/ CABG, MI or angioplasty before 65F 55M? No   Social History Narrative     Not on file         ROS: 14-pt ROS neg other than that noted in the HPI.    PHYSICAL EXAM:  /78   Pulse 72   Ht 1.651 m (5' 5\")   Wt 63.5 kg (140 lb)   BMI 23.30 kg/m    PSYCH: NAD  EYES: EOMI  MOUTH: MMM  NECK: Supple, no notable adenopathy  RESP: Unlabored breathing  NEURO: AAO x3    Cystoscopy 20, Dr. Hernandez: I performed flexible cystoscopy and the bladder showed trabeculation but otherwise was normal throughout.  The prostate showed substantial enlargement with a large intravesical segment.  In particular the left lateral " lobe was enlarged and protruding into the bladder.  I filled the bladder with 300 mL of water and he had a strong urge to urinate.  I removed the scope.  He was unable to urinate    Imaging: None    Post Void Residual: 140mL    Other labs: None today      IMPRESSION:  Urinary retention, now resolved on dual therapy    PLAN:  Continue with Flomax and the finasteride. We will see him back in 6 months for another urinalysis and bladder scan to make certain he continues to void adequately.      Yuliana Sinha PA-C  [facility]    13min spent in total for the encounter reviewing progress notes, UAs/UCs, cysto note.

## 2021-03-17 NOTE — LETTER
3/17/2021       RE: Deepak Mckay  [address] [place] [address]     Dear Colleague,    Thank you for referring your patient, Deepak Mckay, to the [facility] UROLOGY CLINIC [place] at [facility]. Please see a copy of my visit note below.    Office Visit Note  [facility]  [phone]    UROLOGIC DIAGNOSES:   Enlarged prostate, urinary retention    CURRENT INTERVENTIONS:   Flomax, finasteride    HISTORY:   Pleasant 79 y/o with history of retention, requiring Foleys and cysto with Dr. Hernandez. Now on dual therapy and here for UA, PVR. Feeling great. Improved stream, no gross hematuria. Nocturia x 1-2.     Some urgency and frequency in the AM after coffee.     PAST MEDICAL HISTORY:   Past Medical History:   Diagnosis Date     Brachial neuritis or radiculitis NOS     LUE weakness >> R UE      Cleft palate     multiple surgical repairs     Hernia, abdominal      History of spinal cord injury      Hypertension      Hypertrophy of prostate without urinary obstruction and other lower urinary tract symptoms (LUTS)      Obstructive chronic bronchitis with exacerbation (H) 1995     Squamous cell carcinoma of skin of scalp 11/2016     Unspecified cataract     left     Unspecified tinnitus        PAST SURGICAL HISTORY:   Past Surgical History:   Procedure Laterality Date     COLONOSCOPY N/A 11/18/2014    Procedure: COLONOSCOPY;  Surgeon: Clay Henley MD;  Location:  GI     EYE SURGERY      catarct removal      HC COLONOSCOPY THRU STOMA, DIAGNOSTIC  2002; 2006    benign polyps in 2006     HERNIA REPAIR      1955     INSERT APPLIANCE CLEFT PALATE  2000    multiple revisions.      VASECTOMY       ZZC NONSPECIFIC PROCEDURE  2001    cervical disk 4th plating       FAMILY HISTORY:   Family History   Problem Relation Age of Onset     C.A.D. Mother         CABG     Diabetes Maternal Uncle      C.A.D. Maternal Uncle         MI     Hypertension  "Maternal Uncle      Cancer - colorectal No family hx of        SOCIAL HISTORY:   Social History     Socioeconomic History     Marital status:      Spouse name: Not on file     Number of children: Not on file     Years of education: Not on file     Highest education level: Not on file   Occupational History     Not on file   Social Needs     Financial resource strain: Not on file     Food insecurity     Worry: Not on file     Inability: Not on file     Transportation needs     Medical: Not on file     Non-medical: Not on file   Tobacco Use     Smoking status: Former Smoker     Quit date: 1980     Years since quittin.1     Smokeless tobacco: Never Used     Tobacco comment: Pipie smoker, quit  max 1/2ppd cigarettes since    Substance and Sexual Activity     Alcohol use: Yes     Alcohol/week: 1.7 standard drinks     Types: 2 Standard drinks or equivalent per week     Comment: 1 drink a week     Drug use: No     Sexual activity: Not Currently   Lifestyle     Physical activity     Days per week: Not on file     Minutes per session: Not on file     Stress: Not on file   Relationships     Social connections     Talks on phone: Not on file     Gets together: Not on file     Attends Buddhist service: Not on file     Active member of club or organization: Not on file     Attends meetings of clubs or organizations: Not on file     Relationship status: Not on file     Intimate partner violence     Fear of current or ex partner: Not on file     Emotionally abused: Not on file     Physically abused: Not on file     Forced sexual activity: Not on file   Other Topics Concern     Parent/sibling w/ CABG, MI or angioplasty before 65F 55M? No   Social History Narrative     Not on file         ROS: 14-pt ROS neg other than that noted in the HPI.    PHYSICAL EXAM:  /78   Pulse 72   Ht 1.651 m (5' 5\")   Wt 63.5 kg (140 lb)   BMI 23.30 kg/m    PSYCH: NAD  EYES: EOMI  MOUTH: MMM  NECK: Supple, no notable " adenopathy  RESP: Unlabored breathing  NEURO: AAO x3    Cystoscopy 12/14/20, Dr. Hernandez: I performed flexible cystoscopy and the bladder showed trabeculation but otherwise was normal throughout.  The prostate showed substantial enlargement with a large intravesical segment.  In particular the left lateral lobe was enlarged and protruding into the bladder.  I filled the bladder with 300 mL of water and he had a strong urge to urinate.  I removed the scope.  He was unable to urinate    Imaging: None    Post Void Residual: 140mL    Other labs: None today      IMPRESSION:  Urinary retention, now resolved on dual therapy    PLAN:  Continue with Flomax and the finasteride. We will see him back in 6 months for another urinalysis and bladder scan to make certain he continues to void adequately.      Yuliana Sinha PA-C  [facility]    13min spent in total for the encounter reviewing progress notes, UAs/UCs, cysto note.

## 2021-09-15 ENCOUNTER — OFFICE VISIT (OUTPATIENT)
Dept: UROLOGY | Facility: CLINIC | Age: 81
End: 2021-09-15
Payer: COMMERCIAL

## 2021-09-15 VITALS
SYSTOLIC BLOOD PRESSURE: 132 MMHG | WEIGHT: 140 LBS | BODY MASS INDEX: 23.32 KG/M2 | DIASTOLIC BLOOD PRESSURE: 74 MMHG | HEIGHT: 65 IN

## 2021-09-15 DIAGNOSIS — R35.1 BENIGN PROSTATIC HYPERPLASIA WITH NOCTURIA: Primary | ICD-10-CM

## 2021-09-15 DIAGNOSIS — N40.1 BENIGN PROSTATIC HYPERPLASIA WITH NOCTURIA: Primary | ICD-10-CM

## 2021-09-15 LAB
ALBUMIN UR-MCNC: NEGATIVE MG/DL
APPEARANCE UR: CLEAR
BILIRUB UR QL STRIP: NEGATIVE
COLOR UR AUTO: YELLOW
GLUCOSE UR STRIP-MCNC: NEGATIVE MG/DL
HGB UR QL STRIP: ABNORMAL
KETONES UR STRIP-MCNC: NEGATIVE MG/DL
LEUKOCYTE ESTERASE UR QL STRIP: ABNORMAL
NITRATE UR QL: NEGATIVE
PH UR STRIP: 6.5 [PH] (ref 5–7)
RESIDUAL VOLUME (RV) (EXTERNAL): 180
SP GR UR STRIP: 1.01 (ref 1–1.03)
UROBILINOGEN UR STRIP-ACNC: 0.2 E.U./DL

## 2021-09-15 PROCEDURE — 51798 US URINE CAPACITY MEASURE: CPT | Performed by: UROLOGY

## 2021-09-15 PROCEDURE — 81003 URINALYSIS AUTO W/O SCOPE: CPT | Mod: QW | Performed by: UROLOGY

## 2021-09-15 PROCEDURE — 99213 OFFICE O/P EST LOW 20 MIN: CPT | Mod: 25 | Performed by: UROLOGY

## 2021-09-15 ASSESSMENT — PAIN SCALES - GENERAL: PAINLEVEL: NO PAIN (0)

## 2021-09-15 ASSESSMENT — MIFFLIN-ST. JEOR: SCORE: 1256.92

## 2021-09-15 NOTE — LETTER
9/15/2021       RE: Deepak Mckay  [address] [place] [address]     Dear Colleague,    Thank you for referring your patient, Deepak Mckay, to the [facility] [place] at [facility]. Please see a copy of my visit note below.    Office Visit Note  [facility]  [phone]    UROLOGIC DIAGNOSES:   Enlarged prostate, history of retention, incomplete bladder emptying    CURRENT INTERVENTIONS:   Flomax and finasteride    HISTORY:   Deepak returns to clinic today for urologic follow-up.  He remains on the Flomax and finasteride.  He says that he is voiding well and has no urinary complaints at this time.      PAST MEDICAL HISTORY:   Past Medical History:   Diagnosis Date     Brachial neuritis or radiculitis NOS     LUE weakness >> R UE      Cleft palate     multiple surgical repairs     Hernia, abdominal      History of spinal cord injury      Hypertension      Hypertrophy of prostate without urinary obstruction and other lower urinary tract symptoms (LUTS)      Obstructive chronic bronchitis with exacerbation (H) 1995     Squamous cell carcinoma of skin of scalp 11/2016     Unspecified cataract     left     Unspecified tinnitus        PAST SURGICAL HISTORY:   Past Surgical History:   Procedure Laterality Date     COLONOSCOPY N/A 11/18/2014    Procedure: COLONOSCOPY;  Surgeon: Clay Henley MD;  Location:  GI     EYE SURGERY      catarct removal      HERNIA REPAIR      1955     INSERT APPLIANCE CLEFT PALATE  2000    multiple revisions.      VASECTOMY       ZZC NONSPECIFIC PROCEDURE  2001    cervical disk 4th plating     ZZ COLONOSCOPY THRU STOMA, DIAGNOSTIC  2002; 2006    benign polyps in 2006       FAMILY HISTORY:   Family History   Problem Relation Age of Onset     C.A.D. Mother         CABG     Diabetes Maternal Uncle      C.A.D. Maternal Uncle         MI     Hypertension Maternal Uncle      Cancer - colorectal No  family hx of        SOCIAL HISTORY:   Social History     Socioeconomic History     Marital status:      Spouse name: Not on file     Number of children: Not on file     Years of education: Not on file     Highest education level: Not on file   Occupational History     Not on file   Tobacco Use     Smoking status: Former Smoker     Quit date: 1980     Years since quittin.7     Smokeless tobacco: Never Used     Tobacco comment: Pipie smoker, quit  max 1/2ppd cigarettes since    Substance and Sexual Activity     Alcohol use: Yes     Alcohol/week: 1.7 standard drinks     Types: 2 Standard drinks or equivalent per week     Comment: 1 drink a week     Drug use: No     Sexual activity: Not Currently   Other Topics Concern     Parent/sibling w/ CABG, MI or angioplasty before 65F 55M? No   Social History Narrative     Not on file     Social Determinants of Health     Financial Resource Strain:      Difficulty of Paying Living Expenses:    Food Insecurity:      Worried About Running Out of Food in the Last Year:      Ran Out of Food in the Last Year:    Transportation Needs:      Lack of Transportation (Medical):      Lack of Transportation (Non-Medical):    Physical Activity:      Days of Exercise per Week:      Minutes of Exercise per Session:    Stress:      Feeling of Stress :    Social Connections:      Frequency of Communication with Friends and Family:      Frequency of Social Gatherings with Friends and Family:      Attends [employer] Services:      Active Member of Clubs or Organizations:      Attends Club or Organization Meetings:      Marital Status:    Intimate Partner Violence:      Fear of Current or Ex-Partner:      Emotionally Abused:      Physically Abused:      Sexually Abused:        Review Of Systems:  Skin: No rash, pruritis, or skin pigmentation  Eyes: No changes in vision  Ears/Nose/Throat: No changes in hearing, no nosebleeds  Respiratory: No shortness of breath, dyspnea on  exertion, cough, or hemoptysis  Cardiovascular: No chest pain or palpitations  Gastrointestinal: No diarrhea or constipation. No abdominal pain. No hematochezia  Genitourinary: see HPI  Musculoskeletal: No pain or swelling of joints, normal range of motion  Neurologic: No weakness or tremors  Psychiatric: No recent changes in memory or mood  Hematologic/Lymphatic/Immunologic: No easy bruising or enlarged lymph nodes  Endocrine: No weight gain or loss      PHYSICAL EXAM:    There were no vitals taken for this visit.    Constitutional: Well developed. Conversant and in no acute distress  Eyes: Anicteric sclera, conjunctiva clear, normal extraocular movements  ENT: Normocephalic and atraumatic,   Skin: Warm and dry. No rashes or lesions  Cardiac: No peripheral edema  Back/Flank: Not done  CNS/PNS: Normal musculature and movements, moves all extremities normally  Respiratory: Normal non-labored breathing  Abdomen: Soft nontender and nondistended  Peripheral Vascular: No peripheral edema  Mental Status/Psych: Alert and Oriented x 3. Normal mood and affect    Penis: Not done  Scrotal Skin: Not done  Testicles: Not done  Epididymis: Not done  Digital Rectal Exam:     Cystoscopy: Not done    Imaging: None    Urinalysis: UA RESULTS:  Recent Labs   Lab Test 03/17/21  1435 03/17/21  1419 02/08/21  2201   COLOR  --  Yellow  --    APPEARANCE  --  Clear  --    URINEGLC  --  Negative  --    URINEBILI  --  Negative  --    URINEKETONE  --  Negative  --    SG  --  1.010  --    UBLD  --  Trace*  --    URINEPH  --  5.5  --    PROTEIN  --  Negative  --    UROBILINOGEN  --  0.2  --    NITRITE  --  Negative  --    LEUKEST  --  Trace*  --    RBCU 0  --    < >   WBCU 5  --    < >    < > = values in this interval not displayed.       PSA:     Post Void Residual: 180mL    Other labs: None today      IMPRESSION:  Doing well    PLAN:  He continues to do well on the Flomax and the finasteride and he continues to empty his bladder adequately.  I  recommended that he follow-up with us as needed in the future.  He should continue the 2 medications and come back and see me if he feels that he has developed any new or worsening urinary symptoms.      Philippe Hernandez M.D.

## 2021-09-15 NOTE — PROGRESS NOTES
Office Visit Note  [facility]  [phone]    UROLOGIC DIAGNOSES:   Enlarged prostate, history of retention, incomplete bladder emptying    CURRENT INTERVENTIONS:   Flomax and finasteride    HISTORY:   Deepak returns to clinic today for urologic follow-up.  He remains on the Flomax and finasteride.  He says that he is voiding well and has no urinary complaints at this time.      PAST MEDICAL HISTORY:   Past Medical History:   Diagnosis Date     Brachial neuritis or radiculitis NOS     LUE weakness >> R UE      Cleft palate     multiple surgical repairs     Hernia, abdominal      History of spinal cord injury      Hypertension      Hypertrophy of prostate without urinary obstruction and other lower urinary tract symptoms (LUTS)      Obstructive chronic bronchitis with exacerbation (H) 1995     Squamous cell carcinoma of skin of scalp 11/2016     Unspecified cataract     left     Unspecified tinnitus        PAST SURGICAL HISTORY:   Past Surgical History:   Procedure Laterality Date     COLONOSCOPY N/A 11/18/2014    Procedure: COLONOSCOPY;  Surgeon: Clay Henley MD;  Location:  GI     EYE SURGERY      catarct removal      HERNIA REPAIR      1955     INSERT APPLIANCE CLEFT PALATE  2000    multiple revisions.      VASECTOMY       ZZC NONSPECIFIC PROCEDURE  2001    cervical disk 4th plating     ZZHC COLONOSCOPY THRU STOMA, DIAGNOSTIC  2002; 2006    benign polyps in 2006       FAMILY HISTORY:   Family History   Problem Relation Age of Onset     C.A.D. Mother         CABG     Diabetes Maternal Uncle      C.A.D. Maternal Uncle         MI     Hypertension Maternal Uncle      Cancer - colorectal No family hx of        SOCIAL HISTORY:   Social History     Socioeconomic History     Marital status:      Spouse name: Not on file     Number of children: Not on file     Years of education: Not on file     Highest education level: Not on file   Occupational History     Not on file   Tobacco Use      Smoking status: Former Smoker     Quit date: 1980     Years since quittin.7     Smokeless tobacco: Never Used     Tobacco comment: Pipie smoker, quit  max 1/2ppd cigarettes since    Substance and Sexual Activity     Alcohol use: Yes     Alcohol/week: 1.7 standard drinks     Types: 2 Standard drinks or equivalent per week     Comment: 1 drink a week     Drug use: No     Sexual activity: Not Currently   Other Topics Concern     Parent/sibling w/ CABG, MI or angioplasty before 65F 55M? No   Social History Narrative     Not on file     Social Determinants of Health     Financial Resource Strain:      Difficulty of Paying Living Expenses:    Food Insecurity:      Worried About Running Out of Food in the Last Year:      Ran Out of Food in the Last Year:    Transportation Needs:      Lack of Transportation (Medical):      Lack of Transportation (Non-Medical):    Physical Activity:      Days of Exercise per Week:      Minutes of Exercise per Session:    Stress:      Feeling of Stress :    Social Connections:      Frequency of Communication with Friends and Family:      Frequency of Social Gatherings with Friends and Family:      Attends [employer] Services:      Active Member of Clubs or Organizations:      Attends Club or Organization Meetings:      Marital Status:    Intimate Partner Violence:      Fear of Current or Ex-Partner:      Emotionally Abused:      Physically Abused:      Sexually Abused:        Review Of Systems:  Skin: No rash, pruritis, or skin pigmentation  Eyes: No changes in vision  Ears/Nose/Throat: No changes in hearing, no nosebleeds  Respiratory: No shortness of breath, dyspnea on exertion, cough, or hemoptysis  Cardiovascular: No chest pain or palpitations  Gastrointestinal: No diarrhea or constipation. No abdominal pain. No hematochezia  Genitourinary: see HPI  Musculoskeletal: No pain or swelling of joints, normal range of motion  Neurologic: No weakness or tremors  Psychiatric: No  recent changes in memory or mood  Hematologic/Lymphatic/Immunologic: No easy bruising or enlarged lymph nodes  Endocrine: No weight gain or loss      PHYSICAL EXAM:    There were no vitals taken for this visit.    Constitutional: Well developed. Conversant and in no acute distress  Eyes: Anicteric sclera, conjunctiva clear, normal extraocular movements  ENT: Normocephalic and atraumatic,   Skin: Warm and dry. No rashes or lesions  Cardiac: No peripheral edema  Back/Flank: Not done  CNS/PNS: Normal musculature and movements, moves all extremities normally  Respiratory: Normal non-labored breathing  Abdomen: Soft nontender and nondistended  Peripheral Vascular: No peripheral edema  Mental Status/Psych: Alert and Oriented x 3. Normal mood and affect    Penis: Not done  Scrotal Skin: Not done  Testicles: Not done  Epididymis: Not done  Digital Rectal Exam:     Cystoscopy: Not done    Imaging: None    Urinalysis: UA RESULTS:  Recent Labs   Lab Test 03/17/21  1435 03/17/21  1419 02/08/21  2201   COLOR  --  Yellow  --    APPEARANCE  --  Clear  --    URINEGLC  --  Negative  --    URINEBILI  --  Negative  --    URINEKETONE  --  Negative  --    SG  --  1.010  --    UBLD  --  Trace*  --    URINEPH  --  5.5  --    PROTEIN  --  Negative  --    UROBILINOGEN  --  0.2  --    NITRITE  --  Negative  --    LEUKEST  --  Trace*  --    RBCU 0  --    < >   WBCU 5  --    < >    < > = values in this interval not displayed.       PSA:     Post Void Residual: 180mL    Other labs: None today      IMPRESSION:  Doing well    PLAN:  He continues to do well on the Flomax and the finasteride and he continues to empty his bladder adequately.  I recommended that he follow-up with us as needed in the future.  He should continue the 2 medications and come back and see me if he feels that he has developed any new or worsening urinary symptoms.      Philippe Hernandez M.D.

## 2021-09-15 NOTE — NURSING NOTE
Chief Complaint   Patient presents with     Benign Prostatic Hypertrophy     PVR:  180 mL    Katherine Hernandez, EMT

## 2021-12-06 DIAGNOSIS — N40.0 ENLARGED PROSTATE: ICD-10-CM

## 2021-12-06 RX ORDER — TAMSULOSIN HYDROCHLORIDE 0.4 MG/1
CAPSULE ORAL
Qty: 90 CAPSULE | Refills: 2 | Status: SHIPPED | OUTPATIENT
Start: 2021-12-06 | End: 2022-02-24

## 2021-12-06 RX ORDER — FINASTERIDE 5 MG/1
TABLET, FILM COATED ORAL
Qty: 90 TABLET | Refills: 2 | Status: SHIPPED | OUTPATIENT
Start: 2021-12-06 | End: 2022-02-24

## 2022-02-07 ENCOUNTER — DOCUMENTATION ONLY (OUTPATIENT)
Dept: LAB | Facility: CLINIC | Age: 82
End: 2022-02-07
Payer: COMMERCIAL

## 2022-02-07 DIAGNOSIS — I10 HYPERTENSION GOAL BP (BLOOD PRESSURE) < 140/90: ICD-10-CM

## 2022-02-07 DIAGNOSIS — R73.9 ELEVATED BLOOD SUGAR LEVEL: ICD-10-CM

## 2022-02-07 DIAGNOSIS — E78.5 HYPERLIPIDEMIA LDL GOAL <130: Primary | ICD-10-CM

## 2022-02-07 NOTE — PROGRESS NOTES
This patient is requesting labs for a physical before his appt with Dr Richmond. Thanks Palma in lab

## 2022-02-17 ENCOUNTER — LAB (OUTPATIENT)
Dept: LAB | Facility: CLINIC | Age: 82
End: 2022-02-17
Payer: COMMERCIAL

## 2022-02-17 DIAGNOSIS — I10 HYPERTENSION GOAL BP (BLOOD PRESSURE) < 140/90: ICD-10-CM

## 2022-02-17 DIAGNOSIS — R73.9 ELEVATED BLOOD SUGAR LEVEL: ICD-10-CM

## 2022-02-17 DIAGNOSIS — E78.5 HYPERLIPIDEMIA LDL GOAL <130: ICD-10-CM

## 2022-02-17 LAB
ALBUMIN SERPL-MCNC: 3.6 G/DL (ref 3.4–5)
ALP SERPL-CCNC: 63 U/L (ref 40–150)
ALT SERPL W P-5'-P-CCNC: 20 U/L (ref 0–70)
ANION GAP SERPL CALCULATED.3IONS-SCNC: 6 MMOL/L (ref 3–14)
AST SERPL W P-5'-P-CCNC: 18 U/L (ref 0–45)
BILIRUB SERPL-MCNC: 0.7 MG/DL (ref 0.2–1.3)
BUN SERPL-MCNC: 17 MG/DL (ref 7–30)
CALCIUM SERPL-MCNC: 8.8 MG/DL (ref 8.5–10.1)
CHLORIDE BLD-SCNC: 97 MMOL/L (ref 94–109)
CHOLEST SERPL-MCNC: 166 MG/DL
CO2 SERPL-SCNC: 27 MMOL/L (ref 20–32)
CREAT SERPL-MCNC: 0.79 MG/DL (ref 0.66–1.25)
CREAT UR-MCNC: 50 MG/DL
FASTING STATUS PATIENT QL REPORTED: YES
GFR SERPL CREATININE-BSD FRML MDRD: 88 ML/MIN/1.73M2
GLUCOSE BLD-MCNC: 108 MG/DL (ref 70–99)
HBA1C MFR BLD: 6.3 % (ref 0–5.6)
HDLC SERPL-MCNC: 60 MG/DL
LDLC SERPL CALC-MCNC: 93 MG/DL
MICROALBUMIN UR-MCNC: 7 MG/L
MICROALBUMIN/CREAT UR: 14 MG/G CR (ref 0–17)
NONHDLC SERPL-MCNC: 106 MG/DL
POTASSIUM BLD-SCNC: 3.6 MMOL/L (ref 3.4–5.3)
PROT SERPL-MCNC: 7.6 G/DL (ref 6.8–8.8)
SODIUM SERPL-SCNC: 130 MMOL/L (ref 133–144)
TRIGL SERPL-MCNC: 64 MG/DL

## 2022-02-17 PROCEDURE — 80053 COMPREHEN METABOLIC PANEL: CPT

## 2022-02-17 PROCEDURE — 82043 UR ALBUMIN QUANTITATIVE: CPT

## 2022-02-17 PROCEDURE — 36415 COLL VENOUS BLD VENIPUNCTURE: CPT

## 2022-02-17 PROCEDURE — 83036 HEMOGLOBIN GLYCOSYLATED A1C: CPT

## 2022-02-17 PROCEDURE — 80061 LIPID PANEL: CPT

## 2022-02-24 ENCOUNTER — OFFICE VISIT (OUTPATIENT)
Dept: FAMILY MEDICINE | Facility: CLINIC | Age: 82
End: 2022-02-24
Payer: COMMERCIAL

## 2022-02-24 VITALS
DIASTOLIC BLOOD PRESSURE: 68 MMHG | HEIGHT: 65 IN | TEMPERATURE: 97.9 F | SYSTOLIC BLOOD PRESSURE: 106 MMHG | WEIGHT: 140.8 LBS | RESPIRATION RATE: 16 BRPM | OXYGEN SATURATION: 97 % | BODY MASS INDEX: 23.46 KG/M2 | HEART RATE: 85 BPM

## 2022-02-24 DIAGNOSIS — R79.89 LOW SERUM SODIUM: ICD-10-CM

## 2022-02-24 DIAGNOSIS — J42 CHRONIC BRONCHITIS, UNSPECIFIED CHRONIC BRONCHITIS TYPE (H): ICD-10-CM

## 2022-02-24 DIAGNOSIS — Z79.899 LONG TERM CURRENT USE OF DIURETIC: ICD-10-CM

## 2022-02-24 DIAGNOSIS — I10 HYPERTENSION GOAL BP (BLOOD PRESSURE) < 140/90: ICD-10-CM

## 2022-02-24 DIAGNOSIS — Z00.00 ENCOUNTER FOR MEDICARE ANNUAL WELLNESS EXAM: Primary | ICD-10-CM

## 2022-02-24 DIAGNOSIS — N40.0 ENLARGED PROSTATE: ICD-10-CM

## 2022-02-24 PROCEDURE — 99214 OFFICE O/P EST MOD 30 MIN: CPT | Mod: 25 | Performed by: INTERNAL MEDICINE

## 2022-02-24 PROCEDURE — 99397 PER PM REEVAL EST PAT 65+ YR: CPT | Performed by: INTERNAL MEDICINE

## 2022-02-24 RX ORDER — FINASTERIDE 5 MG/1
1 TABLET, FILM COATED ORAL DAILY
Qty: 90 TABLET | Refills: 3 | Status: SHIPPED | OUTPATIENT
Start: 2022-02-24 | End: 2023-02-24

## 2022-02-24 RX ORDER — TAMSULOSIN HYDROCHLORIDE 0.4 MG/1
0.4 CAPSULE ORAL DAILY
Qty: 90 CAPSULE | Refills: 3 | Status: SHIPPED | OUTPATIENT
Start: 2022-02-24 | End: 2023-02-24

## 2022-02-24 RX ORDER — VALSARTAN AND HYDROCHLOROTHIAZIDE 160; 25 MG/1; MG/1
1 TABLET ORAL DAILY
Qty: 90 TABLET | Refills: 3 | Status: SHIPPED | OUTPATIENT
Start: 2022-02-24 | End: 2023-02-27

## 2022-02-24 ASSESSMENT — ENCOUNTER SYMPTOMS
PALPITATIONS: 0
PARESTHESIAS: 0
FEVER: 0
DIZZINESS: 0
JOINT SWELLING: 0
DYSURIA: 0
EYE PAIN: 0
SHORTNESS OF BREATH: 1
SORE THROAT: 0
HEMATURIA: 0
CHILLS: 0
HEARTBURN: 0
COUGH: 1
HEADACHES: 0
HEMATOCHEZIA: 0
WEAKNESS: 1
ARTHRALGIAS: 0
FREQUENCY: 0
NAUSEA: 0
NERVOUS/ANXIOUS: 0
ABDOMINAL PAIN: 0
DIARRHEA: 0
CONSTIPATION: 0
MYALGIAS: 0

## 2022-02-24 ASSESSMENT — PAIN SCALES - GENERAL: PAINLEVEL: NO PAIN (0)

## 2022-02-24 ASSESSMENT — ACTIVITIES OF DAILY LIVING (ADL): CURRENT_FUNCTION: NO ASSISTANCE NEEDED

## 2022-02-24 NOTE — NURSING NOTE
"Chief Complaint   Patient presents with     Medicare Visit     Refill Request     Initial /68 (BP Location: Right arm, Patient Position: Sitting, Cuff Size: Adult Regular)   Pulse 85   Temp 97.9  F (36.6  C) (Oral)   Resp 16   Ht 1.645 m (5' 4.75\")   Wt 63.9 kg (140 lb 12.8 oz)   SpO2 97%   BMI 23.61 kg/m   Estimated body mass index is 23.61 kg/m  as calculated from the following:    Height as of this encounter: 1.645 m (5' 4.75\").    Weight as of this encounter: 63.9 kg (140 lb 12.8 oz).  BP completed using cuff size regular right arm    Lisa Magill, CMA    "

## 2022-02-24 NOTE — PROGRESS NOTES
"  SUBJECTIVE:   Deepak Mckay is a 81 year old male who presents for Preventive Visit.    {Split Bill scripting  The purpose of this visit is to discuss your medical history and prevent health problems before you are sick. You may be responsible for a co-pay, coinsurance, or deductible if your visit today includes services such as checking on a sore throat, having an x-ray or lab test, or treating and evaluating a new or existing condition :436277}  Patient has been advised of split billing requirements and indicates understanding: Yes  Are you in the first 12 months of your Medicare Part B coverage?  No    Physical Health:    In general, how would you rate your overall physical health? { :474990}    Outside of work, how many days during the week do you exercise? { :388934}    Outside of work, approximately how many minutes a day do you exercise?{ :587657}    If you drink alcohol do you typically have >3 drinks per day or >7 drinks per week? { :370027}    Do you usually eat at least 4 servings of fruit and vegetables a day, include whole grains & fiber and avoid regularly eating high fat or \"junk\" foods? { :391439::\"Yes\"}    Do you have any problems taking medications regularly?  { :315448::\"No\"}    Do you have any side effects from medications? { :257093}    Needs assistance for the following daily activities: { :408156}    Which of the following safety concerns are present in your home?  { :970630::\"none identified\"}     Hearing impairment: { :273590}    In the past 6 months, have you been bothered by leaking of urine? { :946887}    Mental Health:    In general, how would you rate your overall mental or emotional health? { :358882}  PHQ-2 Score:      Do you feel safe in your environment? { :029047}    Have you ever done Advance Care Planning? (For example, a Health Directive, POLST, or a discussion with a medical provider or your loved ones about your wishes): { :674333}    Additional concerns to address?  {If " "YES, notify patient they may be responsible for a copay, coinsurance or deductible if the visit today includes services such as checking on a sore throat, having an x-ray or lab test, or treating and evaluating a new or existing condition :371543::\"No\"}    Fall risk:  { :820420}  {If any of the above assessments are answered yes, consider ordering appropriate referrals (Optional):172373::\"click delete button to remove this line now\"}  Cognitive Screening: { :043365}    {Do you have sleep apnea, excessive snoring or daytime drowsiness? (Optional):957336}    {Outside tests to abstract? :579200}    {additional problems to add (Optional):950568}    Reviewed and updated as needed this visit by clinical staff                  Reviewed and updated as needed this visit by Provider                 Social History     Tobacco Use     Smoking status: Former Smoker     Quit date: 1980     Years since quittin.1     Smokeless tobacco: Never Used     Tobacco comment: Pipie smoker, quit  max 1/2ppd cigarettes since    Substance Use Topics     Alcohol use: Yes     Alcohol/week: 1.7 standard drinks     Types: 2 Standard drinks or equivalent per week     Comment: 1 drink a week                           Current providers sharing in care for this patient include: {Rooming staff:  Please update Care Team in Rooming Activity, refresh this note and then delete this statement}  Patient Care Team:  Gaby Richmond MD as PCP - General (Internal Medicine)  Philippe Hernandez MD as Assigned Surgical Provider  Gaby Richmond MD as Assigned PCP  Yuliana Sinha PA-C as Assigned OBGYN Provider    The following health maintenance items are reviewed in Epic and correct as of today:  Health Maintenance   Topic Date Due     ANNUAL REVIEW OF HM ORDERS  Never done     DTAP/TDAP/TD IMMUNIZATION (4 - Td or Tdap) 12/15/2021     PHQ-2  2022     FALL RISK ASSESSMENT  2022     MEDICARE ANNUAL WELLNESS VISIT  " "2023     ADVANCE CARE PLANNING  2026     SPIROMETRY  Completed     COPD ACTION PLAN  Completed     INFLUENZA VACCINE  Completed     Pneumococcal Vaccine: 65+ Years  Completed     ZOSTER IMMUNIZATION  Completed     COVID-19 Vaccine  Completed     IPV IMMUNIZATION  Aged Out     MENINGITIS IMMUNIZATION  Aged Out     HEPATITIS B IMMUNIZATION  Aged Out     {Chronicprobdata (Optional):283731}  {Decision Support (Optional):729638}    ROS:  {ROS COMP:632246}    OBJECTIVE:   There were no vitals taken for this visit. Estimated body mass index is 23.3 kg/m  as calculated from the following:    Height as of 9/15/21: 1.651 m (5' 5\").    Weight as of 9/15/21: 63.5 kg (140 lb).  EXAM:   {Exam :286460}    {Diagnostic Test Results (Optional):927869::\"Diagnostic Test Results:\",\"Labs reviewed in Epic\"}    ASSESSMENT / PLAN:   {Diag Picklist:024495}    {Patient advised of split billing (Optional):712799}    COUNSELING:  {Medicare Counselin}    Estimated body mass index is 23.3 kg/m  as calculated from the following:    Height as of 9/15/21: 1.651 m (5' 5\").    Weight as of 9/15/21: 63.5 kg (140 lb).    {Weight Management Plan (ACO) Complete if BMI is abnormal-  Ages 18-64  BMI >24.9.  Age 65+ with BMI <23 or >30 (Optional):397560}    He reports that he quit smoking about 42 years ago. He has never used smokeless tobacco.    Appropriate preventive services were discussed with this patient, including applicable screening as appropriate for cardiovascular disease, diabetes, osteopenia/osteoporosis, and glaucoma.  As appropriate for age/gender, discussed screening for colorectal cancer, prostate cancer, breast cancer, and cervical cancer. Checklist reviewing preventive services available has been given to the patient.    Reviewed patients plan of care and provided an AVS. The {CarePlan:098590} for Deepak meets the Care Plan requirement. This Care Plan has been established and reviewed with the {PATIENT, FAMILY MEMBER, " CAREGIVER:082164}.    Counseling Resources:  ATP IV Guidelines  Pooled Cohorts Equation Calculator  Breast Cancer Risk Calculator  BRCA-Related Cancer Risk Assessment: FHS-7 Tool  FRAX Risk Assessment  ICSI Preventive Guidelines  Dietary Guidelines for Americans, 2010  USDA's MyPlate  ASA Prophylaxis  Lung CA Screening    Gaby Richmond MD  [facility]

## 2022-02-24 NOTE — PROGRESS NOTES
"Chief Complaint   Patient presents with     Medicare Visit     Refill Request       SUBJECTIVE:   Deepak Mckay is a 81 year old male who presents for Preventive Visit.      Patient has been advised of split billing requirements and indicates understanding: Yes  Are you in the first 12 months of your Medicare coverage?  No    Healthy Habits:     In general, how would you rate your overall health?  Good    Frequency of exercise:  4-5 days/week    Duration of exercise:  30-45 minutes    Do you usually eat at least 4 servings of fruit and vegetables a day, include whole grains    & fiber and avoid regularly eating high fat or \"junk\" foods?  Yes    Taking medications regularly:  Yes    Medication side effects:  None    Ability to successfully perform activities of daily living:  No assistance needed    Home Safety:  No safety concerns identified    Hearing Impairment:  No hearing concerns    In the past 6 months, have you been bothered by leaking of urine?  No    In general, how would you rate your overall mental or emotional health?  Excellent      PHQ-2 Total Score: 0    Additional concerns today:  No    Do you feel safe in your environment? YES    Have you ever done Advance Care Planning? (For example, a Health Directive, POLST, or a discussion with a medical provider or your loved ones about your wishes): Yes, advance care planning is on file.       Fall risk  Fallen 2 or more times in the past year?: No  Any fall with injury in the past year?: No  click delete button to remove this line now  Cognitive Screening   1) Repeat 3 items (Leader, Season, Table)    2) Clock draw: NORMAL  3) 3 item recall: Recalls 3 objects  Results: 3 items recalled: COGNITIVE IMPAIRMENT LESS LIKELY    Mini-CogTM Copyright REBEKAH Higgins. Licensed by the author for use in [facility]; reprinted with permission (ghazala@.[facility]). All rights reserved.      Do you have sleep apnea, excessive snoring or daytime drowsiness?: no    Reviewed " and updated as needed this visit by clinical staff   Tobacco  Allergies  Meds  Problems  Med Hx  Surg Hx  Fam Hx  Soc   Hx        Reviewed and updated as needed this visit by Provider   Tobacco  Allergies  Meds  Problems  Med Hx  Surg Hx  Fam Hx         Social History     Tobacco Use     Smoking status: Former Smoker     Quit date: 1980     Years since quittin.1     Smokeless tobacco: Never Used     Tobacco comment: Pipie smoker, quit  max 1/2ppd cigarettes since    Substance Use Topics     Alcohol use: Yes     Alcohol/week: 1.7 standard drinks     Types: 2 Standard drinks or equivalent per week     Comment: 1 drink a week     If you drink alcohol do you typically have >3 drinks per day or >7 drinks per week? No    Alcohol Use 2022   Prescreen: >3 drinks/day or >7 drinks/week? No   Prescreen: >3 drinks/day or >7 drinks/week? -   No flowsheet data found.      Current providers sharing in care for this patient include:   Patient Care Team:  Gaby Richmond MD as PCP - General (Internal Medicine)  Philippe Hernandez MD as Assigned Surgical Provider  Gaby Richmond MD as Assigned PCP  Yuliana Sinha PA-C as Assigned OBGYN Provider    The following health maintenance items are reviewed in Epic and correct as of today:  Health Maintenance Due   Topic Date Due     ANNUAL REVIEW OF HM ORDERS  Never done     DTAP/TDAP/TD IMMUNIZATION (4 - Td or Tdap) 12/15/2021     FALL RISK ASSESSMENT  2022     Labs reviewed in EPIC  BP Readings from Last 3 Encounters:   22 106/68   09/15/21 132/74   21 130/78    Wt Readings from Last 3 Encounters:   22 63.9 kg (140 lb 12.8 oz)   09/15/21 63.5 kg (140 lb)   21 63.5 kg (140 lb)                  Patient Active Problem List   Diagnosis     Other psoriasis     Cataract     Hyperlipidemia LDL goal <130     Pulmonary emphysema (H)     Cleft palate     Hypertension goal BP (blood pressure) < 140/90     Health  Care Home     Advance Care Planning     BPH (benign prostatic hyperplasia)     COPD (chronic obstructive pulmonary disease) (H)     History of use of hearing aid in both ears     Sandoval catheter present     Past Surgical History:   Procedure Laterality Date     COLONOSCOPY N/A 2014    Procedure: COLONOSCOPY;  Surgeon: Clay Henley MD;  Location:  GI     EYE SURGERY      catarct removal      HERNIA REPAIR           INSERT APPLIANCE CLEFT PALATE  2000    multiple revisions.      VASECTOMY       ZZC NONSPECIFIC PROCEDURE      cervical disk 4th plating     ZZHC COLONOSCOPY THRU STOMA, DIAGNOSTIC  ;     benign polyps in        Social History     Tobacco Use     Smoking status: Former Smoker     Quit date: 1980     Years since quittin.1     Smokeless tobacco: Never Used     Tobacco comment: Pipie smoker, quit  max 1/2ppd cigarettes since    Substance Use Topics     Alcohol use: Yes     Alcohol/week: 1.7 standard drinks     Types: 2 Standard drinks or equivalent per week     Comment: 1 drink a week     Family History   Problem Relation Age of Onset     C.A.D. Mother         CABG     Diabetes Maternal Uncle      C.A.D. Maternal Uncle         MI     Hypertension Maternal Uncle      Cancer - colorectal No family hx of          Current Outpatient Medications   Medication Sig Dispense Refill     finasteride (PROSCAR) 5 MG tablet Take 1 tablet (5 mg) by mouth daily 90 tablet 3     mometasone-formoterol (DULERA) 200-5 MCG/ACT inhaler Inhale 2 puffs into the lungs 2 times daily 39 g 3     tamsulosin (FLOMAX) 0.4 MG capsule Take 1 capsule (0.4 mg) by mouth daily 90 capsule 3     valsartan-hydrochlorothiazide (DIOVAN HCT) 160-25 MG tablet Take 1 tablet by mouth daily Profile Rx: patient will contact pharmacy when needed 90 tablet 3     Allergies   Allergen Reactions     No Known Allergies      Recent Labs   Lab Test 22  0831 21  0759 20  1501 19  0803  "06/10/19  0824   A1C 6.3* 6.0*  --   --  5.6   LDL 93 80  --  106*  --    HDL 60 57  --  60  --    TRIG 64 65  --  70  --    ALT 20 19  --  18  --    CR 0.79 0.84 0.85 0.80 0.92   GFRESTIMATED 88 81 81 84 78   GFRESTBLACK  --  >90 >90 >90 90   POTASSIUM 3.6 3.8 3.8 3.7 3.7            Review of Systems   Constitutional: Negative for chills and fever.   HENT: Positive for hearing loss. Negative for congestion, ear pain and sore throat.    Eyes: Negative for pain and visual disturbance.   Respiratory: Positive for cough and shortness of breath.    Cardiovascular: Negative for chest pain, palpitations and peripheral edema.   Gastrointestinal: Negative for abdominal pain, constipation, diarrhea, heartburn, hematochezia and nausea.   Genitourinary: Positive for impotence. Negative for dysuria, frequency, genital sores, hematuria, penile discharge and urgency.   Musculoskeletal: Negative for arthralgias, joint swelling and myalgias.   Skin: Negative for rash.   Neurological: Positive for weakness. Negative for dizziness, headaches and paresthesias.   Psychiatric/Behavioral: Negative for mood changes. The patient is not nervous/anxious.        OBJECTIVE:   /68 (BP Location: Right arm, Patient Position: Sitting, Cuff Size: Adult Regular)   Pulse 85   Temp 97.9  F (36.6  C) (Oral)   Resp 16   Ht 1.645 m (5' 4.75\")   Wt 63.9 kg (140 lb 12.8 oz)   SpO2 97%   BMI 23.61 kg/m   Estimated body mass index is 23.61 kg/m  as calculated from the following:    Height as of this encounter: 1.645 m (5' 4.75\").    Weight as of this encounter: 63.9 kg (140 lb 12.8 oz).  Physical Exam  GENERAL: healthy, alert and no distress  EYES: Eyes grossly normal to inspection  HENT: normal cephalic/atraumatic, ear canals and TM's normal, nose and mouth without ulcers, oropharynx clear, oral mucous membranes moist; scar from cleft palate repair many years ago nored  NECK: no adenopathy, no asymmetry, masses, or scars and thyroid normal to " palpation  RESP: lungs clear to auscultation - no rales, rhonchi or wheezes  CV: regular rates and rhythm, normal S1 S2, no S3 or S4, no murmur, click or rub, peripheral pulses strong and no peripheral edema  ABDOMEN: soft, nontender, no hepatosplenomegaly, no masses and bowel sounds normal  MS: no gross musculoskeletal defects noted, no edema  NEURO: Normal strength and tone, sensory exam grossly normal, mentation intact, gait normal including heel/toe/tandem walking and Romberg normal  PSYCH: mentation appears normal, affect normal/bright    Diagnostic Test Results:  Labs reviewed in Epic        ASSESSMENT / PLAN:   (Z00.00) Encounter for Medicare annual wellness exam  (primary encounter diagnosis)  Comment: HEALTH CARE MAINTENANCE reviewed and up to date  Plan:     (J42) Chronic bronchitis, unspecified chronic bronchitis type (H)  Comment: Formulary switched to Dulera in the past and has been very effective; He has Albuterol available but not needed in over 12 months  Plan: mometasone-formoterol (DULERA) 200-5 MCG/ACT  inhaler        He reports no exacerbations; feels well; if formulary changes; could reconsider generic Advair for price savings and continued maintenance.    (N40.0) Enlarged prostate  Comment: past urology; no complaints; desires PCP to fill and monitor  Plan: tamsulosin (FLOMAX) 0.4 MG capsule, finasteride        (PROSCAR) 5 MG tablet        Could consider urology reassessment if symptoms progress; overall, he is doing quite well    (I10) Hypertension goal BP (blood pressure) < 140/90  Comment: BLOOD PRESSURE well controlled  Plan: valsartan-hydrochlorothiazide (DIOVAN HCT) 160-25 MG tablet        Monitor diuretic    (R79.89) Low serum sodium  Comment: suspect due to diuretic; monitor and if continues to be low, remove diuretic; BLOOD PRESSURE at appt would allow lower dose  Plan: Basic metabolic panel  (Ca, Cl, CO2, Creat,         Gluc, K, Na, BUN)          (Z79.899) Long term current use of  "diuretic  Comment: diuretic with ARB; monitor electrolytes;   Plan: Basic metabolic panel  (Ca, Cl, CO2, Creat, Gluc, K, Na, BUN)        Consider removing diuretic if low sodium persists.       Patient has been advised of split billing requirements and indicates understanding: Yes    COUNSELING:  Reviewed preventive health counseling, as reflected in patient instructions       Regular exercise       Healthy diet/nutrition    Estimated body mass index is 23.61 kg/m  as calculated from the following:    Height as of this encounter: 1.645 m (5' 4.75\").    Weight as of this encounter: 63.9 kg (140 lb 12.8 oz).        He reports that he quit smoking about 42 years ago. He has never used smokeless tobacco.      Appropriate preventive services were discussed with this patient, including applicable screening as appropriate for cardiovascular disease, diabetes, osteopenia/osteoporosis, and glaucoma.  As appropriate for age/gender, discussed screening for colorectal cancer, prostate cancer, breast cancer, and cervical cancer. Checklist reviewing preventive services available has been given to the patient.    Reviewed patients plan of care and provided an AVS. The Complex Care Plan (for patients with higher acuity and needing more deliberate coordination of services) for Deepak meets the Care Plan requirement. This Care Plan has been established and reviewed with the Patient.    Counseling Resources:  ATP IV Guidelines  Pooled Cohorts Equation Calculator  Breast Cancer Risk Calculator  Breast Cancer: Medication to Reduce Risk  FRAX Risk Assessment  ICSI Preventive Guidelines  Dietary Guidelines for Americans, 2010  USDA's MyPlate  ASA Prophylaxis  Lung CA Screening    Gaby Richmond MD  Internal Medicine   [facility]    Identified Health Risks:    30 minutes in addition to HEALTH CARE MAINTENANCE are spent with patient, over 50% of that time spent providing counselling, discussing and reviewing " medical conditions/concerns, meds and potential side effects.

## 2022-02-24 NOTE — PATIENT INSTRUCTIONS
Patient Education   Personalized Prevention Plan  You are due for the preventive services outlined below.  Your care team is available to assist you in scheduling these services.  If you have already completed any of these items, please share that information with your care team to update in your medical record.  Health Maintenance Due   Topic Date Due     ANNUAL REVIEW OF HM ORDERS  Never done     Diptheria Tetanus Pertussis (DTAP/TDAP/TD) Vaccine (4 - Td or Tdap) 12/15/2021     PHQ-2  01/01/2022     FALL RISK ASSESSMENT  01/20/2022

## 2022-03-31 ENCOUNTER — LAB (OUTPATIENT)
Dept: LAB | Facility: CLINIC | Age: 82
End: 2022-03-31
Payer: COMMERCIAL

## 2022-03-31 DIAGNOSIS — R79.89 LOW SERUM SODIUM: ICD-10-CM

## 2022-03-31 DIAGNOSIS — Z79.899 LONG TERM CURRENT USE OF DIURETIC: ICD-10-CM

## 2022-03-31 LAB
ANION GAP SERPL CALCULATED.3IONS-SCNC: 5 MMOL/L (ref 3–14)
BUN SERPL-MCNC: 24 MG/DL (ref 7–30)
CALCIUM SERPL-MCNC: 9.5 MG/DL (ref 8.5–10.1)
CHLORIDE BLD-SCNC: 104 MMOL/L (ref 94–109)
CO2 SERPL-SCNC: 29 MMOL/L (ref 20–32)
CREAT SERPL-MCNC: 0.94 MG/DL (ref 0.66–1.25)
GFR SERPL CREATININE-BSD FRML MDRD: 80 ML/MIN/1.73M2
GLUCOSE BLD-MCNC: 76 MG/DL (ref 70–99)
POTASSIUM BLD-SCNC: 3.5 MMOL/L (ref 3.4–5.3)
SODIUM SERPL-SCNC: 138 MMOL/L (ref 133–144)

## 2022-03-31 PROCEDURE — 36415 COLL VENOUS BLD VENIPUNCTURE: CPT

## 2022-03-31 PROCEDURE — 80048 BASIC METABOLIC PNL TOTAL CA: CPT

## 2022-06-27 ENCOUNTER — TRANSFERRED RECORDS (OUTPATIENT)
Dept: FAMILY MEDICINE | Facility: CLINIC | Age: 82
End: 2022-06-27

## 2023-02-08 ENCOUNTER — DOCUMENTATION ONLY (OUTPATIENT)
Dept: LAB | Facility: CLINIC | Age: 83
End: 2023-02-08
Payer: COMMERCIAL

## 2023-02-08 DIAGNOSIS — I10 HYPERTENSION GOAL BP (BLOOD PRESSURE) < 140/90: ICD-10-CM

## 2023-02-08 DIAGNOSIS — R73.09 ELEVATED GLUCOSE: ICD-10-CM

## 2023-02-08 DIAGNOSIS — E78.5 HYPERLIPIDEMIA LDL GOAL <130: Primary | ICD-10-CM

## 2023-02-08 NOTE — PROGRESS NOTES
"Patient has lab only appt 2/20/23 for \"yearly labs\", no orders in chart.  Please place FUTURE orders in Epic if appropriate.    Thanks,   lab    "

## 2023-02-20 ENCOUNTER — LAB (OUTPATIENT)
Dept: LAB | Facility: CLINIC | Age: 83
End: 2023-02-20
Payer: COMMERCIAL

## 2023-02-20 DIAGNOSIS — I10 HYPERTENSION GOAL BP (BLOOD PRESSURE) < 140/90: ICD-10-CM

## 2023-02-20 DIAGNOSIS — R73.09 ELEVATED GLUCOSE: ICD-10-CM

## 2023-02-20 DIAGNOSIS — E78.5 HYPERLIPIDEMIA LDL GOAL <130: ICD-10-CM

## 2023-02-20 LAB
ALBUMIN SERPL BCG-MCNC: 4.3 G/DL (ref 3.5–5.2)
ALP SERPL-CCNC: 58 U/L (ref 40–129)
ALT SERPL W P-5'-P-CCNC: 12 U/L (ref 10–50)
ANION GAP SERPL CALCULATED.3IONS-SCNC: 10 MMOL/L (ref 7–15)
AST SERPL W P-5'-P-CCNC: 22 U/L (ref 10–50)
BILIRUB SERPL-MCNC: 0.5 MG/DL
BUN SERPL-MCNC: 18.7 MG/DL (ref 8–23)
CALCIUM SERPL-MCNC: 9 MG/DL (ref 8.8–10.2)
CHLORIDE SERPL-SCNC: 96 MMOL/L (ref 98–107)
CHOLEST SERPL-MCNC: 175 MG/DL
CREAT SERPL-MCNC: 0.81 MG/DL (ref 0.67–1.17)
CREAT UR-MCNC: 46.4 MG/DL
DEPRECATED HCO3 PLAS-SCNC: 28 MMOL/L (ref 22–29)
GFR SERPL CREATININE-BSD FRML MDRD: 87 ML/MIN/1.73M2
GLUCOSE SERPL-MCNC: 107 MG/DL (ref 70–99)
HBA1C MFR BLD: 5.9 % (ref 0–5.6)
HDLC SERPL-MCNC: 69 MG/DL
LDLC SERPL CALC-MCNC: 93 MG/DL
MICROALBUMIN UR-MCNC: <12 MG/L
MICROALBUMIN/CREAT UR: NORMAL MG/G{CREAT}
NONHDLC SERPL-MCNC: 106 MG/DL
POTASSIUM SERPL-SCNC: 3.8 MMOL/L (ref 3.4–5.3)
PROT SERPL-MCNC: 6.8 G/DL (ref 6.4–8.3)
SODIUM SERPL-SCNC: 134 MMOL/L (ref 136–145)
TRIGL SERPL-MCNC: 67 MG/DL

## 2023-02-20 PROCEDURE — 82570 ASSAY OF URINE CREATININE: CPT

## 2023-02-20 PROCEDURE — 83036 HEMOGLOBIN GLYCOSYLATED A1C: CPT

## 2023-02-20 PROCEDURE — 80053 COMPREHEN METABOLIC PANEL: CPT

## 2023-02-20 PROCEDURE — 80061 LIPID PANEL: CPT

## 2023-02-20 PROCEDURE — 36415 COLL VENOUS BLD VENIPUNCTURE: CPT

## 2023-02-20 PROCEDURE — 82043 UR ALBUMIN QUANTITATIVE: CPT

## 2023-02-27 ENCOUNTER — OFFICE VISIT (OUTPATIENT)
Dept: FAMILY MEDICINE | Facility: CLINIC | Age: 83
End: 2023-02-27
Payer: COMMERCIAL

## 2023-02-27 VITALS
HEART RATE: 84 BPM | BODY MASS INDEX: 23.94 KG/M2 | TEMPERATURE: 97.9 F | HEIGHT: 65 IN | OXYGEN SATURATION: 95 % | WEIGHT: 143.7 LBS | SYSTOLIC BLOOD PRESSURE: 116 MMHG | DIASTOLIC BLOOD PRESSURE: 70 MMHG | RESPIRATION RATE: 16 BRPM

## 2023-02-27 DIAGNOSIS — Z79.899 LONG TERM CURRENT USE OF DIURETIC: ICD-10-CM

## 2023-02-27 DIAGNOSIS — J42 CHRONIC BRONCHITIS, UNSPECIFIED CHRONIC BRONCHITIS TYPE (H): ICD-10-CM

## 2023-02-27 DIAGNOSIS — Z00.00 ENCOUNTER FOR MEDICARE ANNUAL WELLNESS EXAM: Primary | ICD-10-CM

## 2023-02-27 DIAGNOSIS — N40.0 ENLARGED PROSTATE: ICD-10-CM

## 2023-02-27 DIAGNOSIS — Z87.730 HX OF CORRECTED CLEFT LIP AND PALATE: ICD-10-CM

## 2023-02-27 DIAGNOSIS — I10 HYPERTENSION GOAL BP (BLOOD PRESSURE) < 140/90: ICD-10-CM

## 2023-02-27 PROCEDURE — G0439 PPPS, SUBSEQ VISIT: HCPCS | Performed by: INTERNAL MEDICINE

## 2023-02-27 PROCEDURE — 99214 OFFICE O/P EST MOD 30 MIN: CPT | Mod: 25 | Performed by: INTERNAL MEDICINE

## 2023-02-27 RX ORDER — VALSARTAN AND HYDROCHLOROTHIAZIDE 160; 25 MG/1; MG/1
1 TABLET ORAL DAILY
Qty: 90 TABLET | Refills: 3 | Status: SHIPPED | OUTPATIENT
Start: 2023-02-27 | End: 2024-03-11

## 2023-02-27 RX ORDER — TAMSULOSIN HYDROCHLORIDE 0.4 MG/1
0.4 CAPSULE ORAL DAILY
Qty: 90 CAPSULE | Refills: 3 | Status: SHIPPED | OUTPATIENT
Start: 2023-02-27 | End: 2024-05-22

## 2023-02-27 RX ORDER — FINASTERIDE 5 MG/1
1 TABLET, FILM COATED ORAL DAILY
Qty: 90 TABLET | Refills: 3 | Status: SHIPPED | OUTPATIENT
Start: 2023-02-27 | End: 2024-05-22

## 2023-02-27 ASSESSMENT — ACTIVITIES OF DAILY LIVING (ADL): CURRENT_FUNCTION: NO ASSISTANCE NEEDED

## 2023-02-27 ASSESSMENT — ENCOUNTER SYMPTOMS
DIARRHEA: 0
COUGH: 1
CHILLS: 0
DYSURIA: 0
HEADACHES: 0
WEAKNESS: 0
EYE PAIN: 0
HEMATURIA: 0
SORE THROAT: 0
JOINT SWELLING: 0
FEVER: 0
ABDOMINAL PAIN: 0
FREQUENCY: 1
NERVOUS/ANXIOUS: 0
ARTHRALGIAS: 0
PALPITATIONS: 0
NAUSEA: 0
MYALGIAS: 0
DIZZINESS: 0
CONSTIPATION: 0
HEARTBURN: 0
HEMATOCHEZIA: 0
PARESTHESIAS: 0
SHORTNESS OF BREATH: 1

## 2023-02-27 ASSESSMENT — PAIN SCALES - GENERAL: PAINLEVEL: NO PAIN (0)

## 2023-02-27 NOTE — PROGRESS NOTES
"Chief Complaint   Patient presents with     Medicare Visit       SUBJECTIVE:   Deepak is a 82 year old who presents for Preventive Visit.    Patient has been advised of split billing requirements and indicates understanding: Yes  Are you in the first 12 months of your Medicare coverage?  No    Healthy Habits:     In general, how would you rate your overall health?  Good    Frequency of exercise:  4-5 days/week    Duration of exercise:  30-45 minutes    Do you usually eat at least 4 servings of fruit and vegetables a day, include whole grains    & fiber and avoid regularly eating high fat or \"junk\" foods?  Yes    Taking medications regularly:  Yes    Medication side effects:  None    Ability to successfully perform activities of daily living:  No assistance needed    Home Safety:  No safety concerns identified    Hearing Impairment:  Feel that people are mumbling or not speaking clearly, difficulty following dialogue in the theater, difficult to understand a speaker at a public meeting or Hinduism service, find that men's voices are easier to understand than woman's and difficulty understanding soft or whispered speech    In the past 6 months, have you been bothered by leaking of urine?  No    In general, how would you rate your overall mental or emotional health?  Excellent      PHQ-2 Total Score: 0    Additional concerns today:  No      Have you ever done Advance Care Planning? (For example, a Health Directive, POLST, or a discussion with a medical provider or your loved ones about your wishes): Yes, advance care planning is on file.     Fall risk  Fallen 2 or more times in the past year?: No  Any fall with injury in the past year?: No  click delete button to remove this line now  Cognitive Screening   1) Repeat 3 items (Leader, Season, Table)    2) Clock draw: NORMAL  3) 3 item recall: Recalls 3 objects  Results: 3 items recalled: COGNITIVE IMPAIRMENT LESS LIKELY    Mini-CogTM Copyright REBEKAH Higgins. Licensed by the " author for use in [facility]; reprinted with permission (ghazala@[facility]). All rights reserved.      Do you have sleep apnea, excessive snoring or daytime drowsiness?: no    Reviewed and updated as needed this visit by clinical staff   Tobacco  Allergies  Meds  Problems  Med Hx  Surg Hx  Fam Hx          Reviewed and updated as needed this visit by Provider   Tobacco  Allergies  Meds  Problems  Med Hx  Surg Hx  Fam Hx         Social History     Tobacco Use     Smoking status: Former     Types: Cigarettes     Quit date: 1980     Years since quittin.1     Smokeless tobacco: Never     Tobacco comments:     Pipie smoker, quit  max 1/2ppd cigarettes since    Substance Use Topics     Alcohol use: Yes     Alcohol/week: 1.7 standard drinks     Types: 2 Standard drinks or equivalent per week     Comment: 1 drink a week     If you drink alcohol do you typically have >3 drinks per day or >7 drinks per week? No    Alcohol Use 2023   Prescreen: >3 drinks/day or >7 drinks/week? No   Prescreen: >3 drinks/day or >7 drinks/week? -   No flowsheet data found.      Current providers sharing in care for this patient include:   Patient Care Team:  Gaby Richmond MD as PCP - General (Internal Medicine)  Philippe Hernandez MD as Assigned Surgical Provider  Gaby Richmond MD as Assigned PCP    The following health maintenance items are reviewed in Epic and correct as of today:  Health Maintenance   Topic Date Due     DTAP/TDAP/TD IMMUNIZATION (4 - Td or Tdap) 12/15/2021     ANNUAL REVIEW OF HM ORDERS  2023     MEDICARE ANNUAL WELLNESS VISIT  2024     FALL RISK ASSESSMENT  2024     ADVANCE CARE PLANNING  2028     SPIROMETRY  Completed     COPD ACTION PLAN  Completed     PHQ-2 (once per calendar year)  Completed     INFLUENZA VACCINE  Completed     Pneumococcal Vaccine: 65+ Years  Completed     ZOSTER IMMUNIZATION  Completed     COVID-19 Vaccine  Completed      IPV IMMUNIZATION  Aged Out     MENINGITIS IMMUNIZATION  Aged Out     Labs reviewed in EPIC  BP Readings from Last 3 Encounters:   23 116/70   22 106/68   09/15/21 132/74    Wt Readings from Last 3 Encounters:   23 65.2 kg (143 lb 11.2 oz)   22 63.9 kg (140 lb 12.8 oz)   09/15/21 63.5 kg (140 lb)                  Patient Active Problem List   Diagnosis     Other psoriasis     Cataract     Hyperlipidemia LDL goal <130     Pulmonary emphysema (H)     Cleft palate     Hypertension goal BP (blood pressure) < 140/90     Health Care Home     Advance Care Planning     BPH (benign prostatic hyperplasia)     COPD (chronic obstructive pulmonary disease) (H)     History of use of hearing aid in both ears     Sandoval catheter present     Past Surgical History:   Procedure Laterality Date     COLONOSCOPY N/A 2014    Procedure: COLONOSCOPY;  Surgeon: Clay Henley MD;  Location:  GI     EYE SURGERY      catarct removal      HERNIA REPAIR           INSERT APPLIANCE CLEFT PALATE      multiple revisions.      VASECTOMY       ZZC NONSPECIFIC PROCEDURE      cervical disk 4th plating     ZZ COLONOSCOPY THRU STOMA, DIAGNOSTIC  ;     benign polyps in        Social History     Tobacco Use     Smoking status: Former     Types: Cigarettes     Quit date: 1980     Years since quittin.1     Smokeless tobacco: Never     Tobacco comments:     Pipie smoker, quit  max 1/2ppd cigarettes since    Substance Use Topics     Alcohol use: Yes     Alcohol/week: 1.7 standard drinks     Types: 2 Standard drinks or equivalent per week     Comment: 1 drink a week     Family History   Problem Relation Age of Onset     C.A.D. Mother         CABG     Diabetes Maternal Uncle      C.A.D. Maternal Uncle         MI     Hypertension Maternal Uncle      Cancer - colorectal No family hx of          Current Outpatient Medications   Medication Sig Dispense Refill     finasteride (PROSCAR) 5  MG tablet Take 1 tablet (5 mg) by mouth daily 90 tablet 3     mometasone-formoterol (DULERA) 200-5 MCG/ACT inhaler Inhale 2 puffs into the lungs 2 times daily 39 g 3     tamsulosin (FLOMAX) 0.4 MG capsule Take 1 capsule (0.4 mg) by mouth daily 90 capsule 3     valsartan-hydrochlorothiazide (DIOVAN HCT) 160-25 MG tablet Take 1 tablet by mouth daily Profile Rx: patient will contact pharmacy when needed 90 tablet 3     Allergies   Allergen Reactions     No Known Allergies      Recent Labs   Lab Test 02/20/23  0738 03/31/22  0800 02/17/22  0831 02/17/22  0831 01/13/21  0759 12/09/20  1501   A1C 5.9*  --   --  6.3* 6.0*  --    LDL 93  --   --  93 80  --    HDL 69  --   --  60 57  --    TRIG 67  --   --  64 65  --    ALT 12  --   --  20 19  --    CR 0.81 0.94   < > 0.79 0.84 0.85   GFRESTIMATED 87 80   < > 88 81 81   GFRESTBLACK  --   --   --   --  >90 >90   POTASSIUM 3.8 3.5   < > 3.6 3.8 3.8    < > = values in this interval not displayed.            Review of Systems   Constitutional: Negative for chills and fever.   HENT: Positive for hearing loss. Negative for congestion, ear pain and sore throat.    Eyes: Negative for pain and visual disturbance.   Respiratory: Positive for cough and shortness of breath.    Cardiovascular: Negative for chest pain, palpitations and peripheral edema.   Gastrointestinal: Negative for abdominal pain, constipation, diarrhea, heartburn, hematochezia and nausea.   Genitourinary: Positive for frequency and impotence. Negative for dysuria, genital sores, hematuria, penile discharge and urgency.   Musculoskeletal: Negative for arthralgias, joint swelling and myalgias.   Skin: Negative for rash.   Neurological: Negative for dizziness, weakness, headaches and paresthesias.   Psychiatric/Behavioral: Negative for mood changes. The patient is not nervous/anxious.      Treadmill walk 2.3 miles in 45 minutes; previously was able to walk 2.5 miles  3-4 times per week at [employer].  Home with  "hand weights 8# curls  120-180 repetitions 6 days per week    Bilateral hearing aids for 10 + years. Audiologist appt coming up 3/23/023         OBJECTIVE:   /70 (BP Location: Right arm, Patient Position: Sitting, Cuff Size: Adult Regular)   Pulse 84   Temp 97.9  F (36.6  C) (Oral)   Resp 16   Ht 1.645 m (5' 4.75\")   Wt 65.2 kg (143 lb 11.2 oz)   SpO2 95%   BMI 24.10 kg/m   Estimated body mass index is 24.1 kg/m  as calculated from the following:    Height as of this encounter: 1.645 m (5' 4.75\").    Weight as of this encounter: 65.2 kg (143 lb 11.2 oz).  Physical Exam  GENERAL: healthy, alert and no distress  EYES: Eyes grossly normal to inspection  HENT: normal cephalic/atraumatic, ear canals and TM's normal, nose and mouth without ulcers or lesions, oropharynx with past cleft palate and cleft lip repair; upper denture in place.  bilateral hearing aids removed for exam  NECK: no adenopathy, no asymmetry, masses, or scars and thyroid normal to palpation  RESP: lungs clear to auscultation - no rales, rhonchi or wheezes  CV: regular rates and rhythm, normal S1 S2, no S3 or S4, peripheral pulses strong and no peripheral edema  ABDOMEN: soft, nontender, no hepatosplenomegaly, no masses and bowel sounds normal  MS: no gross musculoskeletal defects noted, no edema  NEURO: Normal strength and tone, sensory exam grossly normal, mentation intact, gait normal including heel/toe/tandem walking and Romberg normal  PSYCH: mentation appears normal, affect normal/bright    Diagnostic Test Results:  Labs reviewed in Epic              ASSESSMENT / PLAN:   (Z00.00) Encounter for Medicare annual wellness exam  (primary encounter diagnosis)  Comment: HEALTH CARE MAINTENANCE : hearing aids  Plan:     (I10) Hypertension goal BP (blood pressure) < 140/90  Comment: BLOOD PRESSURE well controlled; medications reviewed; reviewed metabolic panel, creatinine, electrolytes and normal microalbuminuria  Plan: " valsartan-hydrochlorothiazide (DIOVAN HCT) 160-25 MG tablet          (N40.0) Enlarged prostate  Comment: pt reports normal urine flow; improved since starting Flomax.   Plan: finasteride (PROSCAR) 5 MG tablet, tamsulosin         (FLOMAX) 0.4 MG capsule          (J42) Chronic bronchitis, unspecified chronic bronchitis type (H)  Comment:  Dulera has been effective; has Albuterol available but not needed in over 12 months  Plan: mometasone-formoterol (DULERA) 200-5 MCG/ACT inhaler       (Z79.899) Long term current use of diuretic  Comment: reviewed potassium and kidney function; overall, stable.   Plan:     (Z87.730) Hx of corrected cleft lip and palate  Comment: surgery as a toddler; upper denture in place  Plan:     Patient has been advised of split billing requirements and indicates understanding: Yes      COUNSELING:  Reviewed preventive health counseling, as reflected in patient instructions       Regular exercise       Healthy diet/nutrition        He reports that he quit smoking about 43 years ago. He has never used smokeless tobacco.      Appropriate preventive services were discussed with this patient, including applicable screening as appropriate for cardiovascular disease, diabetes, osteopenia/osteoporosis, and glaucoma.  As appropriate for age/gender, discussed screening for colorectal cancer, prostate cancer, breast cancer, and cervical cancer. Checklist reviewing preventive services available has been given to the patient.    Reviewed patients plan of care and provided an AVS. The Complex Care Plan (for patients with higher acuity and needing more deliberate coordination of services) for Deepak meets the Care Plan requirement. This Care Plan has been established and reviewed with the Patient.      Gaby Richmond MD  Internal Medicine   [facility]    20 minutes in addition to HEALTH CARE MAINTENANCE are spent with patient, over 50% of that time spent providing counselling,  discussing and reviewing medical conditions/concerns, meds and potential side effects.

## 2023-02-27 NOTE — PATIENT INSTRUCTIONS
Patient Education   Personalized Prevention Plan  You are due for the preventive services outlined below.  Your care team is available to assist you in scheduling these services.  If you have already completed any of these items, please share that information with your care team to update in your medical record.  Health Maintenance Due   Topic Date Due     Diptheria Tetanus Pertussis (DTAP/TDAP/TD) Vaccine (4 - Td or Tdap) 12/15/2021     ANNUAL REVIEW OF HM ORDERS  02/24/2023

## 2023-03-20 ENCOUNTER — ANCILLARY PROCEDURE (OUTPATIENT)
Dept: GENERAL RADIOLOGY | Facility: CLINIC | Age: 83
End: 2023-03-20
Attending: NURSE PRACTITIONER
Payer: COMMERCIAL

## 2023-03-20 ENCOUNTER — OFFICE VISIT (OUTPATIENT)
Dept: FAMILY MEDICINE | Facility: CLINIC | Age: 83
End: 2023-03-20
Payer: COMMERCIAL

## 2023-03-20 ENCOUNTER — NURSE TRIAGE (OUTPATIENT)
Dept: FAMILY MEDICINE | Facility: CLINIC | Age: 83
End: 2023-03-20

## 2023-03-20 VITALS
WEIGHT: 146 LBS | TEMPERATURE: 98.3 F | RESPIRATION RATE: 17 BRPM | DIASTOLIC BLOOD PRESSURE: 70 MMHG | BODY MASS INDEX: 24.32 KG/M2 | HEART RATE: 77 BPM | SYSTOLIC BLOOD PRESSURE: 132 MMHG | HEIGHT: 65 IN | OXYGEN SATURATION: 96 %

## 2023-03-20 DIAGNOSIS — S20.212A RIB CONTUSION, LEFT, INITIAL ENCOUNTER: Primary | ICD-10-CM

## 2023-03-20 DIAGNOSIS — S20.212A RIB CONTUSION, LEFT, INITIAL ENCOUNTER: ICD-10-CM

## 2023-03-20 PROCEDURE — 99203 OFFICE O/P NEW LOW 30 MIN: CPT | Performed by: NURSE PRACTITIONER

## 2023-03-20 PROCEDURE — 71101 X-RAY EXAM UNILAT RIBS/CHEST: CPT | Mod: TC | Performed by: RADIOLOGY

## 2023-03-20 ASSESSMENT — PAIN SCALES - GENERAL: PAINLEVEL: MILD PAIN (3)

## 2023-03-20 NOTE — TELEPHONE ENCOUNTER
"Pt calling with injury that occurred on Thurs 3/16 pt fell and hit side on door jam. Pt thinks he got up too fast and then tripped on something and fell on left, back side of torso.   Did not hit head   No bruising   Pt is using heating pad - sounds like almost consistently  No fever/no chills  Pt feels c/o hard to take a deep breath and to cough.     RN encourage pt to deep breathe, take a break from heating pad.     Next 5 appointments (look out 90 days)    Mar 20, 2023  4:00 PM  (Arrive by 3:40 PM)  Provider Visit with KETAN Burger CNP  [facility] ([facility] ) [id number] [facility] [address] [phone]      Sara COHEN RN       Answer Assessment - Initial Assessment Questions  1. MECHANISM: \"How did the injury happen?\"      Fall   2. ONSET: \"When did the injury happen?\" (Minutes or hours ago)      3/16  3. LOCATION: \"Where on the chest is the injury located?\"      Left/back   4. APPEARANCE: \"What does the injury look like?\"      No   5. BLEEDING: \"Is there any bleeding now? If Yes, ask: How long has it been bleeding?\"      No   6. SEVERITY: \"Any difficulty with breathing?\"      Yes, hard to cough/deep breath   7. SIZE: For cuts, bruises, or swelling, ask: \"How large is it?\" (e.g., inches or centimeters)      No   8. PAIN: \"Is there pain?\" If Yes, ask: \"How bad is the pain?\"   (e.g., Scale 1-10; or mild, moderate, severe)      Yes   9. TETANUS: For any breaks in the skin, ask: \"When was the last tetanus booster?\"      No   10. PREGNANCY: \"Is there any chance you are pregnant?\" \"When was your last menstrual period?\"        Na    Protocols used: CHEST INJURY-A-OH      "

## 2023-03-20 NOTE — PROGRESS NOTES
Assessment & Plan     Rib contusion, left, initial encounter  Acute; will have xray done; discussed home management; encouraged to splint chest when coughing and rising out of bed, may use brace to help with discomfort. Encouraged to do deep breathing exercises every hour to help with lung expansion, may use ibuprofen/tylenol as needed for discomfort, can also apply heat or ice to site.     - XR Ribs & Chest Left G/E 3 Views; Future      KETAN Burger CNP [facility] YADIRA Sotelo is a 82 year old, presenting for the following health issues:  Fall (Fell last Thursday and hit his left rib cage on a door frame.  States he tripped and fell to the left,  denies any other injuries )     States is hurts when he coughs and when he twists in a certain direction. When he takes a deep breath he notes increased discomfort in the left rib area.    History of Present Illness       Reason for visit:  Ribcageinjury  Symptom onset:  1-3 days ago  Symptoms include:  Pain  Symptom intensity:  Moderate  Symptom progression:  Staying the same  Had these symptoms before:  No  What makes it worse:  Coughh  What makes it better:  No    He eats 2-3 servings of fruits and vegetables daily.He consumes 0 sweetened beverage(s) daily.He exercises with enough effort to increase his heart rate 30 to 60 minutes per day.  He exercises with enough effort to increase his heart rate 4 days per week.   He is taking medications regularly.       Landed on his back; hurting when coughing and when taking deep breaths; instance occurred on Thursday. Denies shortness of breath or inability to take deep breaths. States overall he does not feel terrible but does note discomfort that starts on the back and comes around to the front of his ribcage.     Review of Systems   Constitutional, HEENT, cardiovascular, pulmonary, gi and gu systems are negative, except as otherwise noted.      Objective    /70   Pulse 77    "Temp 98.3  F (36.8  C) (Oral)   Resp 17   Ht 1.638 m (5' 4.5\")   Wt 66.2 kg (146 lb)   SpO2 96%   BMI 24.67 kg/m    Body mass index is 24.67 kg/m .  Physical Exam   GENERAL: healthy, alert and no distress  RESP: lungs clear to auscultation - no rales, rhonchi or wheezes  CV: regular rate and rhythm, normal S1 S2, no S3 or S4, no murmur, click or rub, no peripheral edema and peripheral pulses strong  SKIN: ecchymoses - left mid back two yellow ecchymotic areas noted, no tenderness to touch or swelling noted.                 "

## 2023-03-21 ENCOUNTER — TELEPHONE (OUTPATIENT)
Dept: FAMILY MEDICINE | Facility: CLINIC | Age: 83
End: 2023-03-21

## 2023-03-21 ENCOUNTER — ANCILLARY PROCEDURE (OUTPATIENT)
Dept: GENERAL RADIOLOGY | Facility: CLINIC | Age: 83
End: 2023-03-21
Payer: COMMERCIAL

## 2023-03-21 DIAGNOSIS — J93.9 PNEUMOTHORAX: ICD-10-CM

## 2023-03-21 DIAGNOSIS — J93.9 PNEUMOTHORAX: Primary | ICD-10-CM

## 2023-03-21 PROCEDURE — 71046 X-RAY EXAM CHEST 2 VIEWS: CPT | Mod: TC | Performed by: RADIOLOGY

## 2023-03-21 NOTE — RESULT ENCOUNTER NOTE
Called pt and advised of result note below per Marianna Shafer.  Pt verbalized understanding and agreeable to plan.      Scheduled follow up appt for pneumothorax on Friday, 3/24/23 w/ Marianna Shafer.    Anila Cheung RN, BSN  [facility]

## 2023-03-21 NOTE — TELEPHONE ENCOUNTER
Radiologist, Dr. Miranda calling w/ results from x-ray yesterday.     Report   2 broken ribs and pneumothorax.   F/up x-ray should be ordered today to make sure it is not getting bigger.   Order stat.     Call to [facility]- will wait to see if callback w/ in 30 mins.     Sara COHEN RN      called back- order x-ray for today. Pt asymptomatic.     Called pt and scheduled x-ray    Sara COHEN RN

## 2023-03-24 ENCOUNTER — OFFICE VISIT (OUTPATIENT)
Dept: FAMILY MEDICINE | Facility: CLINIC | Age: 83
End: 2023-03-24
Payer: COMMERCIAL

## 2023-03-24 ENCOUNTER — ANCILLARY PROCEDURE (OUTPATIENT)
Dept: GENERAL RADIOLOGY | Facility: CLINIC | Age: 83
End: 2023-03-24
Attending: NURSE PRACTITIONER
Payer: COMMERCIAL

## 2023-03-24 VITALS
TEMPERATURE: 97.4 F | WEIGHT: 142.3 LBS | DIASTOLIC BLOOD PRESSURE: 64 MMHG | HEART RATE: 72 BPM | RESPIRATION RATE: 16 BRPM | SYSTOLIC BLOOD PRESSURE: 110 MMHG | BODY MASS INDEX: 24.05 KG/M2 | OXYGEN SATURATION: 97 %

## 2023-03-24 DIAGNOSIS — S22.42XD CLOSED FRACTURE OF MULTIPLE RIBS OF LEFT SIDE WITH ROUTINE HEALING, SUBSEQUENT ENCOUNTER: Primary | ICD-10-CM

## 2023-03-24 DIAGNOSIS — S27.0XXD TRAUMATIC PNEUMOTHORAX, SUBSEQUENT ENCOUNTER: ICD-10-CM

## 2023-03-24 PROCEDURE — 99213 OFFICE O/P EST LOW 20 MIN: CPT | Performed by: NURSE PRACTITIONER

## 2023-03-24 PROCEDURE — 71046 X-RAY EXAM CHEST 2 VIEWS: CPT | Mod: TC | Performed by: RADIOLOGY

## 2023-03-24 ASSESSMENT — PAIN SCALES - GENERAL: PAINLEVEL: NO PAIN (1)

## 2023-03-24 NOTE — NURSING NOTE
"Chief Complaint   Patient presents with     Rib Pain     RECHECK     Initial /64 (BP Location: Right arm, Patient Position: Sitting, Cuff Size: Adult Regular)   Pulse 72   Temp 97.4  F (36.3  C) (Oral)   Resp 16   Wt 64.5 kg (142 lb 4.8 oz)   SpO2 97%   BMI 24.05 kg/m   Estimated body mass index is 24.05 kg/m  as calculated from the following:    Height as of 3/20/23: 1.638 m (5' 4.5\").    Weight as of this encounter: 64.5 kg (142 lb 4.8 oz).  BP completed using cuff size regular right arm    Lisa Magill, CMA    "

## 2023-03-24 NOTE — PROGRESS NOTES
Assessment & Plan     Closed fracture of multiple ribs of left side with routine healing, subsequent encounter  Acute; improving; discussed continued RICE, ibuprofen as needed, chest splinting, and deep breathing exercises. Discussed red flag symptoms and when to seek immediate medical attention. Will have repeat Xray done today to ensure pneumothorax is improving. Provided additional information via AVS.     Traumatic pneumothorax, subsequent encounter  Acute; see above.     - XR Chest 2 Views; Future      KETAN Burger CNP  [facility] [place]    Ayad Sotelo is a 82 year old, presenting for the following health issues:  Rib Pain and RECHECK    Additional Questions 3/24/2023   Roomed by Lisa Magill, CMA   Accompanied by self     Patient Reported Additional Medications 3/24/2023   Patient reports taking the following new medications NO     HPI     Pain History:  When did you first notice your pain? Ribcage    Have you seen anyone else for your pain? Yes   How has your pain affected your ability to work? Not applicable  Where in your body do you have pain? Musculoskeletal problem/pain  Onset/Duration: 03/20  Description  Location: ribcage  Joint Swelling: No  Redness: No  Pain: YES  Warmth: No  Intensity:  mild, 1/10  Progression of Symptoms:  improving  Accompanying signs and symptoms:   Fevers: No  Numbness/tingling/weakness: No  History  Trauma to the area: YES- ribcage injury due to a FALL  Recent illness:  No  Previous similar problem: No  Previous evaluation:  YES  Precipitating or alleviating factors:  Aggravating factors include: cough and sneeze   Therapies tried and outcome: NSAID - advil does help.    Patient presents to clinic was seen on 3/20/23 and found to have two rib fractures and an apical pneumothorax; was in for a follow up Xray on 03/21/2023 in which showed improvement in size. States today he is feeling much better pain is improving, continues to do deep breathing  exercises and has been using ice and advil to help with symptoms. Denies shortness of breath or chest pain and states he would like to go for a walk today.       Review of Systems   Constitutional, HEENT, cardiovascular, pulmonary, gi and gu systems are negative, except as otherwise noted.      Objective    /64 (BP Location: Right arm, Patient Position: Sitting, Cuff Size: Adult Regular)   Pulse 72   Temp 97.4  F (36.3  C) (Oral)   Resp 16   Wt 64.5 kg (142 lb 4.8 oz)   SpO2 97%   BMI 24.05 kg/m    Body mass index is 24.05 kg/m .  Physical Exam   GENERAL: healthy, alert and no distress  RESP: lungs clear to auscultation - no rales, rhonchi or wheezes  CV: regular rate and rhythm, normal S1 S2, no S3 or S4, no murmur, click or rub, no peripheral edema and peripheral pulses strong  PSYCH: mentation appears normal, affect normal/bright

## 2023-05-15 ENCOUNTER — TRANSFERRED RECORDS (OUTPATIENT)
Dept: HEALTH INFORMATION MANAGEMENT | Facility: CLINIC | Age: 83
End: 2023-05-15
Payer: COMMERCIAL

## 2023-06-28 NOTE — LETTER
1/13/2021       RE: Deepak Mckay  [address] [place] [address]     Dear Colleague,    Thank you for referring your patient, Deepak Mckay, to the [facility]. Please see a copy of my visit note below.    Office Visit Note  [facility]  [phone]    UROLOGIC DIAGNOSES:   Urinary retention    CURRENT INTERVENTIONS:   Flomax, finasteride, Sandoval catheter    HISTORY:   Deepak returns to clinic today for another Sandoval catheter removal and trial of void.  We filled his bladder with 250 mL of water and removed the Sandoval catheter.  He was able to void 150 mL of the water without difficulty.    PAST MEDICAL HISTORY:   Past Medical History:   Diagnosis Date     Brachial neuritis or radiculitis NOS     LUE weakness >> R UE      Cleft palate     multiple surgical repairs     Hernia, abdominal      History of spinal cord injury      Hypertension      Hypertrophy of prostate without urinary obstruction and other lower urinary tract symptoms (LUTS)      Obstructive chronic bronchitis with exacerbation (H) 1995     Squamous cell carcinoma of skin of scalp 11/2016     Unspecified cataract     left     Unspecified tinnitus        PAST SURGICAL HISTORY:   Past Surgical History:   Procedure Laterality Date     COLONOSCOPY N/A 11/18/2014    Procedure: COLONOSCOPY;  Surgeon: Clay Henley MD;  Location:  GI     EYE SURGERY      catarct removal      HC COLONOSCOPY THRU STOMA, DIAGNOSTIC  2002; 2006    benign polyps in 2006     HERNIA REPAIR      1955     INSERT APPLIANCE CLEFT PALATE  2000    multiple revisions.      VASECTOMY       ZZC NONSPECIFIC PROCEDURE  2001    cervical disk 4th plating       FAMILY HISTORY:   Family History   Problem Relation Age of Onset     C.A.D. Mother         CABG     Diabetes Maternal Uncle      C.A.D. Maternal Uncle         MI     Hypertension Maternal Uncle      Cancer - colorectal No  Detail Level: Zone family hx of        SOCIAL HISTORY:   Social History     Socioeconomic History     Marital status:      Spouse name: Not on file     Number of children: Not on file     Years of education: Not on file     Highest education level: Not on file   Occupational History     Not on file   Social Needs     Financial resource strain: Not on file     Food insecurity     Worry: Not on file     Inability: Not on file     Transportation needs     Medical: Not on file     Non-medical: Not on file   Tobacco Use     Smoking status: Former Smoker     Quit date: 1980     Years since quittin.0     Smokeless tobacco: Never Used     Tobacco comment: Pipie smoker, quit  max 1/2ppd cigarettes since    Substance and Sexual Activity     Alcohol use: Yes     Alcohol/week: 1.7 standard drinks     Types: 2 Standard drinks or equivalent per week     Comment: 1 drink a week     Drug use: No     Sexual activity: Not Currently   Lifestyle     Physical activity     Days per week: Not on file     Minutes per session: Not on file     Stress: Not on file   Relationships     Social connections     Talks on phone: Not on file     Gets together: Not on file     Attends Shinto service: Not on file     Active member of club or organization: Not on file     Attends meetings of clubs or organizations: Not on file     Relationship status: Not on file     Intimate partner violence     Fear of current or ex partner: Not on file     Emotionally abused: Not on file     Physically abused: Not on file     Forced sexual activity: Not on file   Other Topics Concern     Parent/sibling w/ CABG, MI or angioplasty before 65F 55M? No   Social History Narrative     Not on file       Review Of Systems:  Skin: No rash, pruritis, or skin pigmentation  Eyes: No changes in vision  Ears/Nose/Throat: No changes in hearing, no nosebleeds  Respiratory: No shortness of breath, dyspnea on exertion, cough, or hemoptysis  Cardiovascular: No chest pain or  palpitations  Gastrointestinal: No diarrhea or constipation. No abdominal pain. No hematochezia  Genitourinary: see HPI  Musculoskeletal: No pain or swelling of joints, normal range of motion  Neurologic: No weakness or tremors  Psychiatric: No recent changes in memory or mood  Hematologic/Lymphatic/Immunologic: No easy bruising or enlarged lymph nodes  Endocrine: No weight gain or loss      PHYSICAL EXAM:    There were no vitals taken for this visit.    Constitutional: Well developed. Conversant and in no acute distress  Eyes: Anicteric sclera, conjunctiva clear, normal extraocular movements  ENT: Normocephalic and atraumatic,   Skin: Warm and dry. No rashes or lesions  Cardiac: No peripheral edema  Back/Flank: Not done  CNS/PNS: Normal musculature and movements, moves all extremities normally  Respiratory: Normal non-labored breathing  Abdomen: Soft nontender and nondistended  Peripheral Vascular: No peripheral edema  Mental Status/Psych: Alert and Oriented x 3. Normal mood and affect    Penis: Not done  Scrotal Skin: Not done  Testicles: Not done  Epididymis: Not done  Digital Rectal Exam:     Cystoscopy: Not done    Imaging: None    Urinalysis: UA RESULTS:  Recent Labs   Lab Test 12/09/20  1509   COLOR Yellow   APPEARANCE Slightly Cloudy   URINEGLC Negative   URINEBILI Negative   URINEKETONE Negative   SG 1.023   UBLD Large*   URINEPH 6.5   PROTEIN 70*   NITRITE Negative   LEUKEST Small*   RBCU >182*   WBCU 4       PSA:     Post Void Residual:     Other labs: None today      IMPRESSION:  Urinary retention now resolved, enlarged prostate,    PLAN:  He is doing much better today and he is urinating on trial of void today.  I instructed to drink large amounts of water today and contact our clinic if he has difficulty voiding.  Otherwise he will resume the Flomax and the finasteride.  I will follow him closely and see him back for a urinalysis and bladder scan in 2 weeks.      Philippe Hernandez M.D.

## 2023-12-04 ENCOUNTER — TRANSFERRED RECORDS (OUTPATIENT)
Dept: HEALTH INFORMATION MANAGEMENT | Facility: CLINIC | Age: 83
End: 2023-12-04
Payer: COMMERCIAL

## 2024-01-31 ENCOUNTER — DOCUMENTATION ONLY (OUTPATIENT)
Dept: FAMILY MEDICINE | Facility: CLINIC | Age: 84
End: 2024-01-31
Payer: COMMERCIAL

## 2024-01-31 DIAGNOSIS — E78.5 HYPERLIPIDEMIA LDL GOAL <130: Primary | ICD-10-CM

## 2024-01-31 DIAGNOSIS — R73.09 ELEVATED GLUCOSE: ICD-10-CM

## 2024-01-31 DIAGNOSIS — I10 HYPERTENSION GOAL BP (BLOOD PRESSURE) < 140/90: ICD-10-CM

## 2024-01-31 NOTE — PROGRESS NOTES
Patient has lab only appt 2/21/24. There is an order for Lipids. Anything else you want before his annual appt. on 2/28/24?    Thanks,   lab

## 2024-02-21 ENCOUNTER — LAB (OUTPATIENT)
Dept: LAB | Facility: CLINIC | Age: 84
End: 2024-02-21
Payer: COMMERCIAL

## 2024-02-21 DIAGNOSIS — I10 HYPERTENSION GOAL BP (BLOOD PRESSURE) < 140/90: ICD-10-CM

## 2024-02-21 DIAGNOSIS — E78.5 HYPERLIPIDEMIA LDL GOAL <130: Primary | ICD-10-CM

## 2024-02-21 DIAGNOSIS — R73.09 ELEVATED GLUCOSE: ICD-10-CM

## 2024-02-21 LAB
ALBUMIN SERPL BCG-MCNC: 4.4 G/DL (ref 3.5–5.2)
ALP SERPL-CCNC: 52 U/L (ref 40–150)
ALT SERPL W P-5'-P-CCNC: 13 U/L (ref 0–70)
ANION GAP SERPL CALCULATED.3IONS-SCNC: 10 MMOL/L (ref 7–15)
AST SERPL W P-5'-P-CCNC: 22 U/L (ref 0–45)
BILIRUB SERPL-MCNC: 0.8 MG/DL
BUN SERPL-MCNC: 19.5 MG/DL (ref 8–23)
CALCIUM SERPL-MCNC: 9.4 MG/DL (ref 8.8–10.2)
CHLORIDE SERPL-SCNC: 94 MMOL/L (ref 98–107)
CHOLEST SERPL-MCNC: 189 MG/DL
CREAT SERPL-MCNC: 0.87 MG/DL (ref 0.67–1.17)
CREAT UR-MCNC: 51.2 MG/DL
DEPRECATED HCO3 PLAS-SCNC: 27 MMOL/L (ref 22–29)
EGFRCR SERPLBLD CKD-EPI 2021: 85 ML/MIN/1.73M2
FASTING STATUS PATIENT QL REPORTED: YES
GLUCOSE SERPL-MCNC: 118 MG/DL (ref 70–99)
HBA1C MFR BLD: 5.8 % (ref 0–5.6)
HDLC SERPL-MCNC: 89 MG/DL
LDLC SERPL CALC-MCNC: 86 MG/DL
MICROALBUMIN UR-MCNC: <12 MG/L
MICROALBUMIN/CREAT UR: NORMAL MG/G{CREAT}
NONHDLC SERPL-MCNC: 100 MG/DL
POTASSIUM SERPL-SCNC: 3.7 MMOL/L (ref 3.4–5.3)
PROT SERPL-MCNC: 7.3 G/DL (ref 6.4–8.3)
SODIUM SERPL-SCNC: 131 MMOL/L (ref 135–145)
TRIGL SERPL-MCNC: 68 MG/DL

## 2024-02-21 PROCEDURE — 80061 LIPID PANEL: CPT

## 2024-02-21 PROCEDURE — 82570 ASSAY OF URINE CREATININE: CPT

## 2024-02-21 PROCEDURE — 36415 COLL VENOUS BLD VENIPUNCTURE: CPT

## 2024-02-21 PROCEDURE — 83036 HEMOGLOBIN GLYCOSYLATED A1C: CPT

## 2024-02-21 PROCEDURE — 80053 COMPREHEN METABOLIC PANEL: CPT

## 2024-02-21 PROCEDURE — 82043 UR ALBUMIN QUANTITATIVE: CPT

## 2024-02-22 ENCOUNTER — TRANSFERRED RECORDS (OUTPATIENT)
Dept: HEALTH INFORMATION MANAGEMENT | Facility: CLINIC | Age: 84
End: 2024-02-22
Payer: COMMERCIAL

## 2024-02-28 ENCOUNTER — OFFICE VISIT (OUTPATIENT)
Dept: FAMILY MEDICINE | Facility: CLINIC | Age: 84
End: 2024-02-28
Payer: COMMERCIAL

## 2024-02-28 VITALS
TEMPERATURE: 98 F | BODY MASS INDEX: 24.42 KG/M2 | RESPIRATION RATE: 16 BRPM | HEIGHT: 65 IN | SYSTOLIC BLOOD PRESSURE: 146 MMHG | DIASTOLIC BLOOD PRESSURE: 67 MMHG | HEART RATE: 68 BPM | WEIGHT: 146.56 LBS | OXYGEN SATURATION: 98 %

## 2024-02-28 DIAGNOSIS — J43.9 PULMONARY EMPHYSEMA, UNSPECIFIED EMPHYSEMA TYPE (H): ICD-10-CM

## 2024-02-28 DIAGNOSIS — R73.03 PREDIABETES: ICD-10-CM

## 2024-02-28 DIAGNOSIS — N40.1 BENIGN PROSTATIC HYPERPLASIA WITH NOCTURIA: ICD-10-CM

## 2024-02-28 DIAGNOSIS — J42 CHRONIC BRONCHITIS, UNSPECIFIED CHRONIC BRONCHITIS TYPE (H): ICD-10-CM

## 2024-02-28 DIAGNOSIS — R35.1 BENIGN PROSTATIC HYPERPLASIA WITH NOCTURIA: ICD-10-CM

## 2024-02-28 DIAGNOSIS — Z00.00 ENCOUNTER FOR MEDICARE ANNUAL WELLNESS EXAM: Primary | ICD-10-CM

## 2024-02-28 DIAGNOSIS — E78.5 HYPERLIPIDEMIA LDL GOAL <130: ICD-10-CM

## 2024-02-28 DIAGNOSIS — I10 HYPERTENSION GOAL BP (BLOOD PRESSURE) < 140/90: ICD-10-CM

## 2024-02-28 DIAGNOSIS — E87.1 HYPONATREMIA: ICD-10-CM

## 2024-02-28 PROCEDURE — 36415 COLL VENOUS BLD VENIPUNCTURE: CPT | Performed by: STUDENT IN AN ORGANIZED HEALTH CARE EDUCATION/TRAINING PROGRAM

## 2024-02-28 PROCEDURE — G0439 PPPS, SUBSEQ VISIT: HCPCS | Performed by: STUDENT IN AN ORGANIZED HEALTH CARE EDUCATION/TRAINING PROGRAM

## 2024-02-28 PROCEDURE — 99214 OFFICE O/P EST MOD 30 MIN: CPT | Mod: 25 | Performed by: STUDENT IN AN ORGANIZED HEALTH CARE EDUCATION/TRAINING PROGRAM

## 2024-02-28 PROCEDURE — 80048 BASIC METABOLIC PNL TOTAL CA: CPT | Performed by: STUDENT IN AN ORGANIZED HEALTH CARE EDUCATION/TRAINING PROGRAM

## 2024-02-28 RX ORDER — RESPIRATORY SYNCYTIAL VIRUS VACCINE 120MCG/0.5
0.5 KIT INTRAMUSCULAR ONCE
Qty: 1 EACH | Refills: 0 | Status: CANCELLED | OUTPATIENT
Start: 2024-02-28 | End: 2024-02-28

## 2024-02-28 SDOH — HEALTH STABILITY: PHYSICAL HEALTH: ON AVERAGE, HOW MANY DAYS PER WEEK DO YOU ENGAGE IN MODERATE TO STRENUOUS EXERCISE (LIKE A BRISK WALK)?: 5 DAYS

## 2024-02-28 SDOH — HEALTH STABILITY: PHYSICAL HEALTH: ON AVERAGE, HOW MANY MINUTES DO YOU ENGAGE IN EXERCISE AT THIS LEVEL?: 60 MIN

## 2024-02-28 ASSESSMENT — SOCIAL DETERMINANTS OF HEALTH (SDOH): HOW OFTEN DO YOU GET TOGETHER WITH FRIENDS OR RELATIVES?: THREE TIMES A WEEK

## 2024-02-28 NOTE — PROGRESS NOTES
"Preventive Care Visit  [facility] SATURNINOMOSTEPHANIE Lyles MD, Family Practice  2024    SUBJECTIVE:   Deepak is a 83 year old, presenting for the following:  Medicare Visit        2024     1:49 PM   Additional Questions   Roomed by kb     Are you in the first 12 months of your Medicare coverage?  No    HPI    HTN  Valsartan-hydrochlorothiazide.  Has been cutting this in half for years.     BPH  Taking tamsulosin and finasteride daily.   Did have a catheter in the past. Doesn't need any longer.  Working well. Doesn't miss any doses at all.     COPD  Takes daily inhaler for the most part but does forget every once in a while.   Does have albuterol, but likely  now. Hasn't used for the past 3 years.   It does seem to be worsening over time. Coughing up stuff a lot more.   He is quite active still, daily walks but COPD has made this a little more difficult lately.  No ED hospitalization nor flare up in the past year.    Skin cancer hx  Follows with Dermatology regularly  Told he has some \"keratoses\"  Has some cream that he uses on top of the scalp to help clear this up  Scheduled for blue light therapy on     Today's PHQ-2 Score:       2024     1:50 PM   PHQ-2 ( 1999 Pfizer)   Q1: Little interest or pleasure in doing things 0   Q2: Feeling down, depressed or hopeless 0   PHQ-2 Score 0     Have you ever done Advance Care Planning? (For example, a Health Directive, POLST, or a discussion with a medical provider or your loved ones about your wishes): Yes, advance care planning is on file.    Fall risk  Fallen 2 or more times in the past year?: No    Cognitive Screening   1) Repeat 3 items (Leader, Season, Table)    2) Clock draw: NORMAL  3) 3 item recall:Recalls 3 objects  Results: 3 items recalled: COGNITIVE IMPAIRMENT LESS LIKELY    Reviewed and updated as needed this visit by clinical staff   Tobacco  Allergies  Meds   Med Hx  Surg Hx  Fam Hx          Reviewed and " "updated as needed this visit by Provider   Tobacco  Allergies    Med Hx  Surg Hx  Fam Hx          Social History     Tobacco Use    Smoking status: Former     Types: Cigarettes     Quit date: 1980     Years since quittin.1    Smokeless tobacco: Never    Tobacco comments:     Pipie smoker, quit  max 1/2ppd cigarettes since    Substance Use Topics    Alcohol use: Yes     Alcohol/week: 1.7 standard drinks of alcohol     Types: 2 Standard drinks or equivalent per week     Comment: one shot of alcohol daily         2024     1:47 PM   Alcohol Use   Prescreen: >3 drinks/day or >7 drinks/week? No     Do you have a current opioid prescription? No  Do you use any other controlled substances or medications that are not prescribed by a provider? None    Current providers sharing in care for this patient include:   Patient Care Team:  Gaby Richmond MD as Assigned PCP    The following health maintenance items are reviewed in Epic and correct as of today:  Health Maintenance   Topic Date Due    RSV VACCINE (Pregnancy & 60+) (1 - 1-dose 60+ series) Never done    DTAP/TDAP/TD IMMUNIZATION (2 - Td or Tdap) 12/15/2021    ANNUAL REVIEW OF HM ORDERS  2024    LIPID  2025    MEDICARE ANNUAL WELLNESS VISIT  2025    FALL RISK ASSESSMENT  2025    ADVANCE CARE PLANNING  2029    SPIROMETRY  Completed    COPD ACTION PLAN  Completed    PHQ-2 (once per calendar year)  Completed    INFLUENZA VACCINE  Completed    Pneumococcal Vaccine: 65+ Years  Completed    ZOSTER IMMUNIZATION  Completed    COVID-19 Vaccine  Completed    IPV IMMUNIZATION  Aged Out    HPV IMMUNIZATION  Aged Out    MENINGITIS IMMUNIZATION  Aged Out    RSV MONOCLONAL ANTIBODY  Aged Out     OBJECTIVE:   BP (!) 146/67   Pulse 68   Temp 98  F (36.7  C)   Resp 16   Ht 1.638 m (5' 4.5\")   Wt 66.5 kg (146 lb 9 oz)   SpO2 98%   BMI 24.77 kg/m     Estimated body mass index is 24.77 kg/m  as calculated from the " "following:    Height as of this encounter: 1.638 m (5' 4.5\").    Weight as of this encounter: 66.5 kg (146 lb 9 oz).    Physical Exam  GENERAL: healthy, alert and no distress  HEAD: Normocephalic, atraumatic.   EYES: PERRL. Normal conjunctivae, sclera.   ENT: Bilateral hearing aids removed for exam. Normal EAC and TMs bilaterally. Maxillary denture in place.   NECK: Supple. No lymphadenopathy appreciated. Trachea midline. Thyroid not enlarged, not TTP.  RESP: lungs clear to auscultation - no rales, rhonchi or wheezes  CV: regular rate and rhythm, normal S1 S2, no murmur, click, rub or gallop.  No peripheral swelling noted.   ABDOMEN: soft, no TTP x4 quadrants. No hepatomegaly or masses appreciated. BS normactive.  MSK: no gross musculoskeletal defects noted.  SKIN: no suspicious lesions or rashes.  EXT: Warm and well perfused. DP pulses 2+ bilaterally.  NEURO: CNII-XII grossly intact. No focal deficits.  PSYCH: Groomed, dressed appropriately for weather. Normal mood with consistent affect.     ASSESSMENT / PLAN:   Encounter for Medicare annual wellness exam  Doing well today. Recent lipid WNL. Discussed vaccination recommendations.    Prediabetes  Recent A1c stable, reviewed with patient.    Hyponatremia  Mild, likely 2/2 hydrochlorothiazide, as below. Will repeat. Repeat still low, transition to alternative HTN medication as below.  - Basic metabolic panel  (Ca, Cl, CO2, Creat, Gluc, K, Na, BUN)    Hypertension goal BP (blood pressure) < 140/90  Uncontrolled in office today. On valsartan-hydrochlorothiazide combo but patient notes he only takes 1/2 tab daily. Due to hyponatremia above, will plan to separate prescription. Increase valsartan at 160mg (full tab) daily and stop hydrochlorothiazide. RTC for repeat blood pressure/BMP in 2 weeks. If BP still elevated at that time, plan to add 5mg amlodipine.   - Basic metabolic panel  (Ca, Cl, CO2, Creat, Gluc, K, Na, BUN)  - valsartan (DIOVAN) 160 MG tablet "     Hyperlipidemia LDL goal <130  Lipid WNL. Discussed in office.    Pulmonary emphysema, unspecified emphysema type (H)  Chronic bronchitis, unspecified chronic bronchitis type (H)  CAT score of 19 today. Currently on ICS-LABA. Per chart review, hx of 'asthma' documented but patient denies every having this. Last spirometry from 2013 revealing FEV1/FVC of 43% predicted but no FEV1 reported as %. Plan to add LAMA (for triple therapy) and repeat spirometry. Follow up in one month for reassessment.  - tiotropium (SPIRIVA RESPIMAT) 2.5 MCG/ACT inhaler   - spirometry    Benign prostatic hyperplasia with nocturia  Currently on tamsulosin 0.4mg daily and finasteride 5mg daily. Last seen by Urology in 2021 and stated no need to return unless sxs worsening. BMP stable. Ok to continue current regimen for 1 yr.     Counseling  Reviewed preventive health counseling, as reflected in patient instructions    He reports that he quit smoking about 44 years ago. He has never used smokeless tobacco.    Appropriate preventive services were discussed with this patient, including applicable screening as appropriate for fall prevention, nutrition, physical activity, Tobacco-use cessation, weight loss and cognition.  Checklist reviewing preventive services available has been given to the patient.    Reviewed patients plan of care and provided an AVS. The Intermediate Care Plan ( asthma action plan, low back pain action plan, and migraine action plan) for Deepak meets the Care Plan requirement. This Care Plan has been established and reviewed with the Patient.          Signed Electronically by: Jose Francisco Lyles MD    Identified Health Risks  I have reviewed Opioid Use Disorder and Substance Use Disorder risk factors and made any needed referrals.

## 2024-02-28 NOTE — PATIENT INSTRUCTIONS
Due for tetanus booster. Please have done at the pharmacy.    It was nice to meet you!    Dr. Felton    Preventive Care Advice   This is general advice given by our system to help you stay healthy. However, your care team may have specific advice just for you. Please talk to your care team about your preventive care needs.  Nutrition  Eat 5 or more servings of fruits and vegetables each day.  Try wheat bread, brown rice and whole grain pasta (instead of white bread, rice, and pasta).  Get enough calcium and vitamin D. Check the label on foods and aim for 100% of the RDA (recommended daily allowance).  Lifestyle  Exercise at least 150 minutes each week   (30 minutes a day, 5 days a week).  Do muscle strengthening activities 2 days a week. These help control your weight and prevent disease.  No smoking.  Wear sunscreen to prevent skin cancer.  Have a dental exam and cleaning every 6 months.  Yearly exams  See your health care team every year to talk about:  Any changes in your health.  Any medicines your care team has prescribed.  Preventive care, family planning, and ways to prevent chronic diseases.  Shots (vaccines)   HPV shots (up to age 26), if you've never had them before.  Hepatitis B shots (up to age 59), if you've never had them before.  COVID-19 shot: Get this shot when it's due.  Flu shot: Get a flu shot every year.  Tetanus shot: Get a tetanus shot every 10 years.  Pneumococcal, hepatitis A, and RSV shots: Ask your care team if you need these based on your risk.  Shingles shot (for age 50 and up).  General health tests  Diabetes screening:  Starting at age 35, Get screened for diabetes at least every 3 years.  If you are younger than age 35, ask your care team if you should be screened for diabetes.  Cholesterol test: At age 39, start having a cholesterol test every 5 years, or more often if advised.  Bone density scan (DEXA): At age 50, ask your care team if you should have this scan for osteoporosis  (brittle bones).  Hepatitis C: Get tested at least once in your life.  STIs (sexually transmitted infections)  Before age 24: Ask your care team if you should be screened for STIs.  After age 24: Get screened for STIs if you're at risk. You are at risk for STIs (including HIV) if:  You are sexually active with more than one person.  You don't use condoms every time.  You or a partner was diagnosed with a sexually transmitted infection.  If you are at risk for HIV, ask about PrEP medicine to prevent HIV.  Get tested for HIV at least once in your life, whether you are at risk for HIV or not.  Cancer screening tests  Cervical cancer screening: If you have a cervix, begin getting regular cervical cancer screening tests at age 21. Most people who have regular screenings with normal results can stop after age 65. Talk about this with your provider.  Breast cancer scan (mammogram): If you've ever had breasts, begin having regular mammograms starting at age 40. This is a scan to check for breast cancer.  Colon cancer screening: It is important to start screening for colon cancer at age 45.  Have a colonoscopy test every 10 years (or more often if you're at risk) Or, ask your provider about stool tests like a FIT test every year or Cologuard test every 3 years.  To learn more about your testing options, visit: https://www.Fullscreen/270498.pdf.  For help making a decision, visit: https://bit.ly/ss23838.  Prostate cancer screening test: If you have a prostate and are age 55 to 69, ask your provider if you would benefit from a yearly prostate cancer screening test.  Lung cancer screening: If you are a current or former smoker age 50 to 80, ask your care team if ongoing lung cancer screenings are right for you.  For informational purposes only. Not to replace the advice of your health care provider. Copyright   2023 [employer]. All rights reserved. Clinically reviewed by the [facility] Transitions Program.  [employer] [id number] - REV 01/24.

## 2024-02-29 ENCOUNTER — TRANSFERRED RECORDS (OUTPATIENT)
Dept: HEALTH INFORMATION MANAGEMENT | Facility: CLINIC | Age: 84
End: 2024-02-29

## 2024-02-29 LAB
ANION GAP SERPL CALCULATED.3IONS-SCNC: 12 MMOL/L (ref 7–15)
BUN SERPL-MCNC: 16.2 MG/DL (ref 8–23)
CALCIUM SERPL-MCNC: 9.1 MG/DL (ref 8.8–10.2)
CHLORIDE SERPL-SCNC: 95 MMOL/L (ref 98–107)
CREAT SERPL-MCNC: 0.76 MG/DL (ref 0.67–1.17)
DEPRECATED HCO3 PLAS-SCNC: 24 MMOL/L (ref 22–29)
EGFRCR SERPLBLD CKD-EPI 2021: 88 ML/MIN/1.73M2
GLUCOSE SERPL-MCNC: 109 MG/DL (ref 70–99)
POTASSIUM SERPL-SCNC: 3.9 MMOL/L (ref 3.4–5.3)
SODIUM SERPL-SCNC: 131 MMOL/L (ref 135–145)

## 2024-03-01 PROBLEM — R73.03 PREDIABETES: Status: ACTIVE | Noted: 2024-03-01

## 2024-03-06 DIAGNOSIS — J42 CHRONIC BRONCHITIS, UNSPECIFIED CHRONIC BRONCHITIS TYPE (H): ICD-10-CM

## 2024-03-11 RX ORDER — VALSARTAN 160 MG/1
160 TABLET ORAL DAILY
Qty: 30 TABLET | Refills: 0 | Status: SHIPPED | OUTPATIENT
Start: 2024-03-11 | End: 2024-04-10

## 2024-04-01 ENCOUNTER — ALLIED HEALTH/NURSE VISIT (OUTPATIENT)
Dept: FAMILY MEDICINE | Facility: CLINIC | Age: 84
End: 2024-04-01
Payer: COMMERCIAL

## 2024-04-01 VITALS — DIASTOLIC BLOOD PRESSURE: 82 MMHG | HEART RATE: 64 BPM | SYSTOLIC BLOOD PRESSURE: 134 MMHG | OXYGEN SATURATION: 96 %

## 2024-04-01 DIAGNOSIS — I10 HYPERTENSION GOAL BP (BLOOD PRESSURE) < 140/90: Primary | ICD-10-CM

## 2024-04-01 PROCEDURE — 99207 PR NO CHARGE NURSE ONLY: CPT

## 2024-04-01 NOTE — PROGRESS NOTES
Agree with plan set forth by MA.    Jose Francisco Lyles MD  [facility]  4/1/2024

## 2024-04-01 NOTE — PROGRESS NOTES
In for blood pressure check.    Today's reading: /82   Pulse 64   SpO2 96%      History   Smoking Status    Former    Quit date: 1/1/1980   Smokeless Tobacco    Never     Comment: Pipie smoker, quit 1980 max 1/2ppd cigarettes since 1955       Current Outpatient Medications   Medication Sig    finasteride (PROSCAR) 5 MG tablet Take 1 tablet (5 mg) by mouth daily    mometasone-formoterol (DULERA) 200-5 MCG/ACT inhaler Inhale 2 puffs into the lungs 2 times daily    tamsulosin (FLOMAX) 0.4 MG capsule Take 1 capsule (0.4 mg) by mouth daily    tiotropium (SPIRIVA RESPIMAT) 2.5 MCG/ACT inhaler Inhale 2 puffs into the lungs daily    valsartan (DIOVAN) 160 MG tablet Take 1 tablet (160 mg) by mouth daily     No current facility-administered medications for this visit.        He is having his blood pressure monitored because it has been mildly elevated and has been on medication.    Deepak has had the following symptoms: none    We discussed Medication compliance and follow up in another week.     HAS ONLY BEEN ON INCREASED DOSE OF MEDICATION FOR THE PAST WEEK OR SO.      SCHEDULED FOR 4/10/2024 FOR A FOLLOW UP B/P AND WILL HAVE ENOUGH MEDICATION TO LAST UNTIL THEN.

## 2024-04-06 DIAGNOSIS — J42 CHRONIC BRONCHITIS, UNSPECIFIED CHRONIC BRONCHITIS TYPE (H): ICD-10-CM

## 2024-04-06 DIAGNOSIS — J43.9 PULMONARY EMPHYSEMA, UNSPECIFIED EMPHYSEMA TYPE (H): ICD-10-CM

## 2024-04-08 RX ORDER — TIOTROPIUM BROMIDE INHALATION SPRAY 3.12 UG/1
SPRAY, METERED RESPIRATORY (INHALATION)
Qty: 4 G | Refills: 0 | Status: SHIPPED | OUTPATIENT
Start: 2024-04-08 | End: 2024-05-23

## 2024-04-10 ENCOUNTER — ALLIED HEALTH/NURSE VISIT (OUTPATIENT)
Dept: FAMILY MEDICINE | Facility: CLINIC | Age: 84
End: 2024-04-10
Payer: COMMERCIAL

## 2024-04-10 ENCOUNTER — TELEPHONE (OUTPATIENT)
Dept: FAMILY MEDICINE | Facility: CLINIC | Age: 84
End: 2024-04-10

## 2024-04-10 VITALS — SYSTOLIC BLOOD PRESSURE: 129 MMHG | DIASTOLIC BLOOD PRESSURE: 81 MMHG

## 2024-04-10 DIAGNOSIS — I10 HYPERTENSION GOAL BP (BLOOD PRESSURE) < 140/90: Primary | ICD-10-CM

## 2024-04-10 DIAGNOSIS — I10 HYPERTENSION GOAL BP (BLOOD PRESSURE) < 140/90: ICD-10-CM

## 2024-04-10 PROCEDURE — 99207 PR NO CHARGE NURSE ONLY: CPT

## 2024-04-10 RX ORDER — VALSARTAN 160 MG/1
160 TABLET ORAL DAILY
Qty: 90 TABLET | Refills: 1 | Status: SHIPPED | OUTPATIENT
Start: 2024-04-10 | End: 2024-09-27

## 2024-04-10 NOTE — TELEPHONE ENCOUNTER
Blood pressure looks good on increased dose of medication. Does need repeat BMP (was to have done at same time). Please let him know to set up lab only visit for this week. Is important to make sure kidney function is stable.    Will refill valsartan in meantime.    Jose Francisco Lyles MD  [facility]  4/10/2024

## 2024-04-10 NOTE — PROGRESS NOTES
Deepak Mckay is a 83 year old patient who comes in today for a Blood Pressure check.  Initial BP:  /81 (BP Location: Right arm, Patient Position: Sitting, Cuff Size: Adult Regular)      Data Unavailable  Disposition: follow-up as previously indicated by provider and results routed to provider.    Renita Felder MA      BP Readings from Last 6 Encounters:   04/10/24 129/81   04/01/24 134/82   02/28/24 (!) 146/67   03/24/23 110/64   03/20/23 132/70   02/27/23 116/70

## 2024-04-10 NOTE — TELEPHONE ENCOUNTER
/81 (BP Location: Right arm, Patient Position: Sitting, Cuff Size: Adult Regular)     BP Readings from Last 6 Encounters:   04/10/24 129/81   04/01/24 134/82   02/28/24 (!) 146/67   03/24/23 110/64   03/20/23 132/70   02/27/23 116/70         Renita Felder MA
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
## 2024-04-11 ENCOUNTER — LAB (OUTPATIENT)
Dept: LAB | Facility: CLINIC | Age: 84
End: 2024-04-11
Payer: COMMERCIAL

## 2024-04-11 DIAGNOSIS — I10 HYPERTENSION GOAL BP (BLOOD PRESSURE) < 140/90: ICD-10-CM

## 2024-04-11 DIAGNOSIS — E87.1 HYPONATREMIA: ICD-10-CM

## 2024-04-11 PROCEDURE — 36415 COLL VENOUS BLD VENIPUNCTURE: CPT

## 2024-04-11 PROCEDURE — 80048 BASIC METABOLIC PNL TOTAL CA: CPT

## 2024-04-11 NOTE — TELEPHONE ENCOUNTER
Called pt and relayed provider message below. Patient was given an opportunity to ask questions, verbalized understanding of plan, and is agreeable.   Lab only appt scheduled.     Meche Srivastava RN    Appointments in Next Year      Apr 11, 2024  1:00 PM  LAB with  LAB  [facility] ([facility] ) [phone]

## 2024-04-12 LAB
ANION GAP SERPL CALCULATED.3IONS-SCNC: 12 MMOL/L (ref 7–15)
BUN SERPL-MCNC: 15.8 MG/DL (ref 8–23)
CALCIUM SERPL-MCNC: 9.4 MG/DL (ref 8.8–10.2)
CHLORIDE SERPL-SCNC: 99 MMOL/L (ref 98–107)
CREAT SERPL-MCNC: 0.86 MG/DL (ref 0.67–1.17)
DEPRECATED HCO3 PLAS-SCNC: 25 MMOL/L (ref 22–29)
EGFRCR SERPLBLD CKD-EPI 2021: 85 ML/MIN/1.73M2
GLUCOSE SERPL-MCNC: 107 MG/DL (ref 70–99)
POTASSIUM SERPL-SCNC: 4 MMOL/L (ref 3.4–5.3)
SODIUM SERPL-SCNC: 136 MMOL/L (ref 135–145)

## 2024-04-29 ENCOUNTER — HOSPITAL ENCOUNTER (OUTPATIENT)
Dept: RESPIRATORY THERAPY | Facility: CLINIC | Age: 84
Discharge: HOME | End: 2024-04-29
Attending: STUDENT IN AN ORGANIZED HEALTH CARE EDUCATION/TRAINING PROGRAM | Admitting: STUDENT IN AN ORGANIZED HEALTH CARE EDUCATION/TRAINING PROGRAM
Payer: COMMERCIAL

## 2024-04-29 DIAGNOSIS — J43.9 PULMONARY EMPHYSEMA, UNSPECIFIED EMPHYSEMA TYPE (H): ICD-10-CM

## 2024-04-29 PROCEDURE — 94729 DIFFUSING CAPACITY: CPT

## 2024-04-29 PROCEDURE — 250N000009 HC RX 250

## 2024-04-29 PROCEDURE — 94060 EVALUATION OF WHEEZING: CPT

## 2024-04-29 PROCEDURE — 999N000157 HC STATISTIC RCP TIME EA 10 MIN

## 2024-04-29 RX ORDER — ALBUTEROL SULFATE 0.83 MG/ML
2.5 SOLUTION RESPIRATORY (INHALATION)
Status: COMPLETED | OUTPATIENT
Start: 2024-04-29 | End: 2024-04-29

## 2024-04-29 RX ORDER — ALBUTEROL SULFATE 0.83 MG/ML
SOLUTION RESPIRATORY (INHALATION)
Status: COMPLETED
Start: 2024-04-29 | End: 2024-04-29

## 2024-04-29 RX ADMIN — ALBUTEROL SULFATE 2.5 MG: 0.83 SOLUTION RESPIRATORY (INHALATION) at 15:39

## 2024-04-29 RX ADMIN — ALBUTEROL SULFATE 2.5 MG: 2.5 SOLUTION RESPIRATORY (INHALATION) at 15:39

## 2024-04-29 NOTE — LETTER
May 2, 2024      Deepak Mckay  [address] [place] [address]        Hi Deepak,     Your emphysema/COPD test shows mild airflow obstruction which is improved from your last spirometry test in 2014. Please continue your current inhalers.     Dr. Felton     Resulted Orders   General PFT Lab (Please always keep checked)   Result Value Ref Range    FVC-Pred 2.81 L    FVC-Pre 3.69 L    FVC-%Pred-Pre 131 %    FEV1-Pre 1.37 L    FEV1-%Pred-Pre 65 %    FEV1FVC-Pred 76 %    FEV1FVC-Pre 37 %    FEFMax-Pred 5.29 L/sec    FEFMax-Pre 3.47 L/sec    FEFMax-%Pred-Pre 65 %    FEF2575-Pred 1.52 L/sec    FEF2575-Pre 0.30 L/sec    TAQ7340-%Pred-Pre 19 %    FEF2575-Post 0.31 L/sec    GVC0282-%Pred-Post 20 %    ExpTime-Pre 16.78 sec    FIFMax-Pre 2.05 L/sec    VC-Pred 3.18 L    VC-Pre 3.49 L    VC-%Pred-Pre 109 %    IC-Pred 1.91 L    IC-Pre 2.22 L    IC-%Pred-Pre 116 %    ERV-Pred 0.95 L    ERV-Pre 1.26 L    ERV-%Pred-Pre 132 %    FEV1FEV6-Pred 75 %    FEV1FEV6-Pre 51 %    DLCOunc-Pred 20.01 ml/min/mmHg    DLCOunc-Pre 16.76 ml/min/mmHg    DLCOunc-%Pred-Pre 83 %    VA-Pre 5.25 L    VA-%Pred-Pre 102 %    FEV1SVC-Pred 66 %    FEV1SVC-Pre 39 %    Narrative    The FEV1 and FEV1/FVC ratio are reduced. Following administration of bronchodilators, there is no significant response. The diffusing capacity is normal.        IMPRESSION:    Mild airflow obstruction.    No significant change following bronchodilators but this does not rule out clinical efficacy.    Normal diffusing capacity.     Ant Morel MD          This interpretation has been electronically signed:  ANT MOREL 04/30/2024  05:13:15 PM         If you have any questions or concerns, please call the clinic at the number listed above.

## 2024-04-29 NOTE — PROGRESS NOTES
PFT Note:        Pt completed spirometry and DLCO testing.  Pre/post flow volume loops with 2.5mg Albuterol nebulizer.  Good Pt effort and cooperation.  All testing meets ATS recommendations.  DLCO is an average of 2 maneuvers.  No recent Hgb available for DLCO correction.    April 29, 2024.4:01 PM  [place], RT

## 2024-04-30 LAB
DLCOUNC-%PRED-PRE: 83 %
DLCOUNC-PRE: 16.76 ML/MIN/MMHG
DLCOUNC-PRED: 20.01 ML/MIN/MMHG
ERV-%PRED-PRE: 132 %
ERV-PRE: 1.26 L
ERV-PRED: 0.95 L
EXPTIME-PRE: 16.78 SEC
FEF2575-%PRED-POST: 20 %
FEF2575-%PRED-PRE: 19 %
FEF2575-POST: 0.31 L/SEC
FEF2575-PRE: 0.3 L/SEC
FEF2575-PRED: 1.52 L/SEC
FEFMAX-%PRED-PRE: 65 %
FEFMAX-PRE: 3.47 L/SEC
FEFMAX-PRED: 5.29 L/SEC
FEV1-%PRED-PRE: 65 %
FEV1-PRE: 1.37 L
FEV1FEV6-PRE: 51 %
FEV1FEV6-PRED: 75 %
FEV1FVC-PRE: 37 %
FEV1FVC-PRED: 76 %
FEV1SVC-PRE: 39 %
FEV1SVC-PRED: 66 %
FIFMAX-PRE: 2.05 L/SEC
FVC-%PRED-PRE: 131 %
FVC-PRE: 3.69 L
FVC-PRED: 2.81 L
IC-%PRED-PRE: 116 %
IC-PRE: 2.22 L
IC-PRED: 1.91 L
VA-%PRED-PRE: 102 %
VA-PRE: 5.25 L
VC-%PRED-PRE: 109 %
VC-PRE: 3.49 L
VC-PRED: 3.18 L

## 2024-05-22 DIAGNOSIS — N40.0 ENLARGED PROSTATE: ICD-10-CM

## 2024-05-22 RX ORDER — TAMSULOSIN HYDROCHLORIDE 0.4 MG/1
0.4 CAPSULE ORAL DAILY
Qty: 90 CAPSULE | Refills: 2 | Status: SHIPPED | OUTPATIENT
Start: 2024-05-22

## 2024-05-22 RX ORDER — FINASTERIDE 5 MG/1
1 TABLET, FILM COATED ORAL DAILY
Qty: 90 TABLET | Refills: 2 | Status: SHIPPED | OUTPATIENT
Start: 2024-05-22

## 2024-05-23 DIAGNOSIS — J42 CHRONIC BRONCHITIS, UNSPECIFIED CHRONIC BRONCHITIS TYPE (H): ICD-10-CM

## 2024-05-23 DIAGNOSIS — J43.9 PULMONARY EMPHYSEMA, UNSPECIFIED EMPHYSEMA TYPE (H): ICD-10-CM

## 2024-05-23 RX ORDER — TIOTROPIUM BROMIDE INHALATION SPRAY 3.12 UG/1
SPRAY, METERED RESPIRATORY (INHALATION)
Qty: 4 G | Refills: 1 | Status: SHIPPED | OUTPATIENT
Start: 2024-05-23 | End: 2024-08-19

## 2024-07-29 ENCOUNTER — TRANSFERRED RECORDS (OUTPATIENT)
Dept: HEALTH INFORMATION MANAGEMENT | Facility: CLINIC | Age: 84
End: 2024-07-29
Payer: COMMERCIAL

## 2024-08-19 DIAGNOSIS — J43.9 PULMONARY EMPHYSEMA, UNSPECIFIED EMPHYSEMA TYPE (H): ICD-10-CM

## 2024-08-19 DIAGNOSIS — J42 CHRONIC BRONCHITIS, UNSPECIFIED CHRONIC BRONCHITIS TYPE (H): ICD-10-CM

## 2024-08-19 RX ORDER — TIOTROPIUM BROMIDE INHALATION SPRAY 3.12 UG/1
SPRAY, METERED RESPIRATORY (INHALATION)
Qty: 4 G | Refills: 0 | Status: SHIPPED | OUTPATIENT
Start: 2024-08-19 | End: 2024-10-04

## 2024-09-27 DIAGNOSIS — I10 HYPERTENSION GOAL BP (BLOOD PRESSURE) < 140/90: ICD-10-CM

## 2024-09-27 RX ORDER — VALSARTAN 160 MG/1
160 TABLET ORAL DAILY
Qty: 90 TABLET | Refills: 0 | Status: SHIPPED | OUTPATIENT
Start: 2024-09-27

## 2024-10-03 DIAGNOSIS — J43.9 PULMONARY EMPHYSEMA, UNSPECIFIED EMPHYSEMA TYPE (H): ICD-10-CM

## 2024-10-03 DIAGNOSIS — J42 CHRONIC BRONCHITIS, UNSPECIFIED CHRONIC BRONCHITIS TYPE (H): ICD-10-CM

## 2024-10-04 RX ORDER — TIOTROPIUM BROMIDE INHALATION SPRAY 3.12 UG/1
SPRAY, METERED RESPIRATORY (INHALATION)
Qty: 4 G | Refills: 2 | Status: SHIPPED | OUTPATIENT
Start: 2024-10-04

## 2025-01-06 DIAGNOSIS — I10 HYPERTENSION GOAL BP (BLOOD PRESSURE) < 140/90: ICD-10-CM

## 2025-01-06 RX ORDER — VALSARTAN 160 MG/1
160 TABLET ORAL DAILY
Qty: 90 TABLET | Refills: 0 | Status: SHIPPED | OUTPATIENT
Start: 2025-01-06

## 2025-01-28 ENCOUNTER — DOCUMENTATION ONLY (OUTPATIENT)
Dept: FAMILY MEDICINE | Facility: CLINIC | Age: 85
End: 2025-01-28
Payer: COMMERCIAL

## 2025-01-28 NOTE — PROGRESS NOTES
Deepak CRAWFORD Edmarivan has an upcoming lab appointment:    Future Appointments   Date Time Provider Department Center   2/24/2025  7:30 AM [facility]   3/3/2025  8:30 AM Jose Francisco Lyles MD [facility]     Patient is scheduled for the following lab(s): labs per Rafal    There is no order available. Please review and place either future orders or HMPO (Review of Health Maintenance Protocol Orders), as appropriate.    Health Maintenance Due   Topic    ANNUAL REVIEW OF HM ORDERS     LIPID      Sumeet Christiansen, CMA, MLT

## 2025-01-28 NOTE — PROGRESS NOTES
Brief chart review.    Please let patient know that I dedicate time to review all of my patient's charts in depth and determine what labs are necessary the morning of our scheduled visit. We will then obtain all necessary labs directly prior or after the scheduled office visit. This way, we can also make changes/additions deemed necessary depending on current symptoms of patient. Please let him know that lab only appt is not needed    Thanks,    Jose Francisco Lyles MD  [facility]  1/28/2025

## 2025-01-28 NOTE — PROGRESS NOTES
ANNM to call back. Two more attempts will be made.     Jennifer Leigh  Lead   [facility] Dada Escalera

## 2025-01-29 NOTE — PROGRESS NOTES
Pt was called and notified that his lab request will be discussed at his upcoming appt.     Amarilis Escalera   [facility]

## 2025-02-10 DIAGNOSIS — N40.0 ENLARGED PROSTATE: ICD-10-CM

## 2025-02-10 RX ORDER — TAMSULOSIN HYDROCHLORIDE 0.4 MG/1
0.4 CAPSULE ORAL DAILY
Qty: 90 CAPSULE | Refills: 0 | Status: SHIPPED | OUTPATIENT
Start: 2025-02-10

## 2025-02-10 RX ORDER — FINASTERIDE 5 MG/1
1 TABLET, FILM COATED ORAL DAILY
Qty: 90 TABLET | Refills: 0 | Status: SHIPPED | OUTPATIENT
Start: 2025-02-10

## 2025-03-03 ENCOUNTER — OFFICE VISIT (OUTPATIENT)
Dept: FAMILY MEDICINE | Facility: CLINIC | Age: 85
End: 2025-03-03
Payer: COMMERCIAL

## 2025-03-03 VITALS
TEMPERATURE: 97.6 F | SYSTOLIC BLOOD PRESSURE: 147 MMHG | HEART RATE: 73 BPM | DIASTOLIC BLOOD PRESSURE: 83 MMHG | BODY MASS INDEX: 23.46 KG/M2 | HEIGHT: 65 IN | WEIGHT: 140.8 LBS | RESPIRATION RATE: 14 BRPM | OXYGEN SATURATION: 97 %

## 2025-03-03 DIAGNOSIS — N40.1 BENIGN PROSTATIC HYPERPLASIA WITH NOCTURIA: ICD-10-CM

## 2025-03-03 DIAGNOSIS — I10 HYPERTENSION GOAL BP (BLOOD PRESSURE) < 140/90: ICD-10-CM

## 2025-03-03 DIAGNOSIS — N40.0 ENLARGED PROSTATE: ICD-10-CM

## 2025-03-03 DIAGNOSIS — J42 CHRONIC BRONCHITIS, UNSPECIFIED CHRONIC BRONCHITIS TYPE (H): ICD-10-CM

## 2025-03-03 DIAGNOSIS — R06.09 DOE (DYSPNEA ON EXERTION): ICD-10-CM

## 2025-03-03 DIAGNOSIS — R35.1 BENIGN PROSTATIC HYPERPLASIA WITH NOCTURIA: ICD-10-CM

## 2025-03-03 DIAGNOSIS — Z00.00 ENCOUNTER FOR MEDICARE ANNUAL WELLNESS EXAM: Primary | ICD-10-CM

## 2025-03-03 DIAGNOSIS — J43.9 PULMONARY EMPHYSEMA, UNSPECIFIED EMPHYSEMA TYPE (H): ICD-10-CM

## 2025-03-03 DIAGNOSIS — R73.03 PREDIABETES: ICD-10-CM

## 2025-03-03 LAB
ANION GAP SERPL CALCULATED.3IONS-SCNC: 12 MMOL/L (ref 7–15)
BUN SERPL-MCNC: 16.3 MG/DL (ref 8–23)
CALCIUM SERPL-MCNC: 9.4 MG/DL (ref 8.8–10.4)
CHLORIDE SERPL-SCNC: 100 MMOL/L (ref 98–107)
CREAT SERPL-MCNC: 0.78 MG/DL (ref 0.67–1.17)
EGFRCR SERPLBLD CKD-EPI 2021: 87 ML/MIN/1.73M2
EST. AVERAGE GLUCOSE BLD GHB EST-MCNC: 123 MG/DL
GLUCOSE SERPL-MCNC: 119 MG/DL (ref 70–99)
HBA1C MFR BLD: 5.9 % (ref 0–5.6)
HCO3 SERPL-SCNC: 25 MMOL/L (ref 22–29)
POTASSIUM SERPL-SCNC: 4.4 MMOL/L (ref 3.4–5.3)
SODIUM SERPL-SCNC: 137 MMOL/L (ref 135–145)

## 2025-03-03 PROCEDURE — 83036 HEMOGLOBIN GLYCOSYLATED A1C: CPT | Performed by: STUDENT IN AN ORGANIZED HEALTH CARE EDUCATION/TRAINING PROGRAM

## 2025-03-03 PROCEDURE — 80048 BASIC METABOLIC PNL TOTAL CA: CPT | Performed by: STUDENT IN AN ORGANIZED HEALTH CARE EDUCATION/TRAINING PROGRAM

## 2025-03-03 PROCEDURE — 36415 COLL VENOUS BLD VENIPUNCTURE: CPT | Performed by: STUDENT IN AN ORGANIZED HEALTH CARE EDUCATION/TRAINING PROGRAM

## 2025-03-03 RX ORDER — TAMSULOSIN HYDROCHLORIDE 0.4 MG/1
0.4 CAPSULE ORAL DAILY
Qty: 90 CAPSULE | Refills: 3 | Status: SHIPPED | OUTPATIENT
Start: 2025-03-03

## 2025-03-03 RX ORDER — TIOTROPIUM BROMIDE INHALATION SPRAY 3.12 UG/1
2 SPRAY, METERED RESPIRATORY (INHALATION) DAILY
Qty: 4 G | Refills: 11 | Status: SHIPPED | OUTPATIENT
Start: 2025-03-03

## 2025-03-03 RX ORDER — VALSARTAN 160 MG/1
160 TABLET ORAL DAILY
Qty: 90 TABLET | Refills: 3 | Status: SHIPPED | OUTPATIENT
Start: 2025-03-03

## 2025-03-03 RX ORDER — FINASTERIDE 5 MG/1
1 TABLET, FILM COATED ORAL DAILY
Qty: 90 TABLET | Refills: 3 | Status: SHIPPED | OUTPATIENT
Start: 2025-03-03

## 2025-03-03 SDOH — HEALTH STABILITY: PHYSICAL HEALTH: ON AVERAGE, HOW MANY MINUTES DO YOU ENGAGE IN EXERCISE AT THIS LEVEL?: 40 MIN

## 2025-03-03 SDOH — HEALTH STABILITY: PHYSICAL HEALTH: ON AVERAGE, HOW MANY DAYS PER WEEK DO YOU ENGAGE IN MODERATE TO STRENUOUS EXERCISE (LIKE A BRISK WALK)?: 7 DAYS

## 2025-03-03 ASSESSMENT — ASTHMA QUESTIONNAIRES
QUESTION_1 LAST FOUR WEEKS HOW MUCH OF THE TIME DID YOUR ASTHMA KEEP YOU FROM GETTING AS MUCH DONE AT WORK, SCHOOL OR AT HOME: ALL OF THE TIME
QUESTION_5 LAST FOUR WEEKS HOW WOULD YOU RATE YOUR ASTHMA CONTROL: WELL CONTROLLED
QUESTION_2 LAST FOUR WEEKS HOW OFTEN HAVE YOU HAD SHORTNESS OF BREATH: MORE THAN ONCE A DAY
QUESTION_4 LAST FOUR WEEKS HOW OFTEN HAVE YOU USED YOUR RESCUE INHALER OR NEBULIZER MEDICATION (SUCH AS ALBUTEROL): THREE OR MORE TIMES PER DAY
ACT_TOTALSCORE: 12
ACT_TOTALSCORE: 12
QUESTION_3 LAST FOUR WEEKS HOW OFTEN DID YOUR ASTHMA SYMPTOMS (WHEEZING, COUGHING, SHORTNESS OF BREATH, CHEST TIGHTNESS OR PAIN) WAKE YOU UP AT NIGHT OR EARLIER THAN USUAL IN THE MORNING: NOT AT ALL

## 2025-03-03 ASSESSMENT — PAIN SCALES - GENERAL: PAINLEVEL_OUTOF10: NO PAIN (0)

## 2025-03-03 ASSESSMENT — SOCIAL DETERMINANTS OF HEALTH (SDOH): HOW OFTEN DO YOU GET TOGETHER WITH FRIENDS OR RELATIVES?: ONCE A WEEK

## 2025-03-03 NOTE — LETTER
March 4, 2025      Deepak Mckay  [address] [place] [address]        Hi Deepak,     Your kidney function is stable.     Your diabetes screening test is still in the prediabetic range but also stable.     Dr. Jose Francisco Narayanan   Hemoglobin A1c   Result Value Ref Range    Estimated Average Glucose 123 (H) <117 mg/dL    Hemoglobin A1C 5.9 (H) 0.0 - 5.6 %      Comment:      Normal <5.7%   Prediabetes 5.7-6.4%    Diabetes 6.5% or higher     Note: Adopted from ADA consensus guidelines.   Basic metabolic panel  (Ca, Cl, CO2, Creat, Gluc, K, Na, BUN)   Result Value Ref Range    Sodium 137 135 - 145 mmol/L    Potassium 4.4 3.4 - 5.3 mmol/L    Chloride 100 98 - 107 mmol/L    Carbon Dioxide (CO2) 25 22 - 29 mmol/L    Anion Gap 12 7 - 15 mmol/L    Urea Nitrogen 16.3 8.0 - 23.0 mg/dL    Creatinine 0.78 0.67 - 1.17 mg/dL    GFR Estimate 87 >60 mL/min/1.73m2      Comment:      eGFR calculated using 2021 CKD-EPI equation.    Calcium 9.4 8.8 - 10.4 mg/dL    Glucose 119 (H) 70 - 99 mg/dL       If you have any questions or concerns, please call the clinic at the number listed above.

## 2025-03-03 NOTE — PATIENT INSTRUCTIONS
Patient Education   Preventive Care Advice   This is general advice given by our system to help you stay healthy. However, your care team may have specific advice just for you. Please talk to your care team about your preventive care needs.  Nutrition  Eat 5 or more servings of fruits and vegetables each day.  Try wheat bread, brown rice and whole grain pasta (instead of white bread, rice, and pasta).  Get enough calcium and vitamin D. Check the label on foods and aim for 100% of the RDA (recommended daily allowance).  Lifestyle  Exercise at least 150 minutes each week  (30 minutes a day, 5 days a week).  Do muscle strengthening activities 2 days a week. These help control your weight and prevent disease.  No smoking.  Wear sunscreen to prevent skin cancer.  Have a dental exam and cleaning every 6 months.  Yearly exams  See your health care team every year to talk about:  Any changes in your health.  Any medicines your care team has prescribed.  Preventive care, family planning, and ways to prevent chronic diseases.  Shots (vaccines)   HPV shots (up to age 26), if you've never had them before.  Hepatitis B shots (up to age 59), if you've never had them before.  COVID-19 shot: Get this shot when it's due.  Flu shot: Get a flu shot every year.  Tetanus shot: Get a tetanus shot every 10 years.  Pneumococcal, hepatitis A, and RSV shots: Ask your care team if you need these based on your risk.  Shingles shot (for age 50 and up)  General health tests  Diabetes screening:  Starting at age 35, Get screened for diabetes at least every 3 years.  If you are younger than age 35, ask your care team if you should be screened for diabetes.  Cholesterol test: At age 39, start having a cholesterol test every 5 years, or more often if advised.  Bone density scan (DEXA): At age 50, ask your care team if you should have this scan for osteoporosis (brittle bones).  Hepatitis C: Get tested at least once in your life.  STIs (sexually  transmitted infections)  Before age 24: Ask your care team if you should be screened for STIs.  After age 24: Get screened for STIs if you're at risk. You are at risk for STIs (including HIV) if:  You are sexually active with more than one person.  You don't use condoms every time.  You or a partner was diagnosed with a sexually transmitted infection.  If you are at risk for HIV, ask about PrEP medicine to prevent HIV.  Get tested for HIV at least once in your life, whether you are at risk for HIV or not.  Cancer screening tests  Cervical cancer screening: If you have a cervix, begin getting regular cervical cancer screening tests starting at age 21.  Breast cancer scan (mammogram): If you've ever had breasts, begin having regular mammograms starting at age 40. This is a scan to check for breast cancer.  Colon cancer screening: It is important to start screening for colon cancer at age 45.  Have a colonoscopy test every 10 years (or more often if you're at risk) Or, ask your provider about stool tests like a FIT test every year or Cologuard test every 3 years.  To learn more about your testing options, visit:   .  For help making a decision, visit:   https://bit.ly/wo96242.  Prostate cancer screening test: If you have a prostate, ask your care team if a prostate cancer screening test (PSA) at age 55 is right for you.  Lung cancer screening: If you are a current or former smoker ages 50 to 80, ask your care team if ongoing lung cancer screenings are right for you.  For informational purposes only. Not to replace the advice of your health care provider. Copyright   2023 [facility]. All rights reserved. Clinically reviewed by the [facility]itions Program. [employer] [id number] - REV 01/24.  Preventing Falls: Care Instructions  Injuries and health problems such as trouble walking or poor eyesight can increase your risk of falling. So can some medicines. But there are things you can do to help  "prevent falls. You can exercise to get stronger. You can also arrange your home to make it safer.    Talk to your doctor about the medicines you take. Ask if any of them increase the risk of falls and whether they can be changed or stopped.   Try to exercise regularly. It can help improve your strength and balance. This can help lower your risk of falling.         Practice fall safety and prevention.   Wear low-heeled shoes that fit well and give your feet good support. Talk to your doctor if you have foot problems that make this hard.  Carry a cellphone or wear a medical alert device that you can use to call for help.  Use stepladders instead of chairs to reach high objects. Don't climb if you're at risk for falls. Ask for help, if needed.  Wear the correct eyeglasses, if you need them.        Make your home safer.   Remove rugs, cords, clutter, and furniture from walkways.  Keep your house well lit. Use night-lights in hallways and bathrooms.  Install and use sturdy handrails on stairways.  Wear nonskid footwear, even inside. Don't walk barefoot or in socks without shoes.        Be safe outside.   Use handrails, curb cuts, and ramps whenever possible.  Keep your hands free by using a shoulder bag or backpack.  Try to walk in well-lit areas. Watch out for uneven ground, changes in pavement, and debris.  Be careful in the winter. Walk on the grass or gravel when sidewalks are slippery. Use de-icer on steps and walkways. Add non-slip devices to shoes.    Put grab bars and nonskid mats in your shower or tub and near the toilet. Try to use a shower chair or bath bench when bathing.   Get into a tub or shower by putting in your weaker leg first. Get out with your strong side first. Have a phone or medical alert device in the bathroom with you.   Where can you learn more?  Go to https://www.[employer].net/patiented  Enter G117 in the search box to learn more about \"Preventing Falls: Care Instructions.\"  Current as of: " July 31, 2024  Content Version: 14.3    2024 [employer].   Care instructions adapted under license by your healthcare professional. If you have questions about a medical condition or this instruction, always ask your healthcare professional. [employer] disclaims any warranty or liability for your use of this information.    Hearing Loss: Care Instructions  Overview     Hearing loss is a sudden or slow decrease in how well you hear. It can range from slight to profound. Permanent hearing loss can occur with aging. It also can happen when you are exposed long-term to loud noise. Examples include listening to loud music, riding motorcycles, or being around other loud machines.  Hearing loss can affect your work and home life. It can make you feel lonely or depressed. You may feel that you have lost your independence. But hearing aids and other devices can help you hear better and feel connected to others.  Follow-up care is a key part of your treatment and safety. Be sure to make and go to all appointments, and call your doctor if you are having problems. It's also a good idea to know your test results and keep a list of the medicines you take.  How can you care for yourself at home?  Avoid loud noises whenever possible. This helps keep your hearing from getting worse.  Always wear hearing protection around loud noises.  Wear a hearing aid as directed.  A professional can help you pick a hearing aid that will work best for you.  You can also get hearing aids over the counter for mild to moderate hearing loss.  Have hearing tests as your doctor suggests. They can show whether your hearing has changed. Your hearing aid may need to be adjusted.  Use other devices as needed. These may include:  Telephone amplifiers and hearing aids that can connect to a television, stereo, radio, or microphone.  Devices that use lights or vibrations. These alert you to the doorbell, a ringing telephone, or a baby  "monitor.  Television closed-captioning. This shows the words at the bottom of the screen. Most new TVs can do this.  TTY (text telephone). This lets you type messages back and forth on the telephone instead of talking or listening. These devices are also called TDD. When messages are typed on the keyboard, they are sent over the phone line to a receiving TTY. The message is shown on a monitor.  Use text messaging, social media, and email if it is hard for you to communicate by telephone.  Try to learn a listening technique called speechreading. It is not lipreading. You pay attention to people's gestures, expressions, posture, and tone of voice. These clues can help you understand what a person is saying. Face the person you are talking to, and have them face you. Make sure the lighting is good. You need to see the other person's face clearly.  Think about counseling if you need help to adjust to your hearing loss.  When should you call for help?  Watch closely for changes in your health, and be sure to contact your doctor if:    You think your hearing is getting worse.     You have new symptoms, such as dizziness or nausea.   Where can you learn more?  Go to https://www.[employer].net/patiented  Enter R798 in the search box to learn more about \"Hearing Loss: Care Instructions.\"  Current as of: September 27, 2023  Content Version: 14.3    2024 [employer].   Care instructions adapted under license by your healthcare professional. If you have questions about a medical condition or this instruction, always ask your healthcare professional. [employer] disclaims any warranty or liability for your use of this information.       "

## 2025-03-03 NOTE — PROGRESS NOTES
Preventive Care Visit  [facility]  Jose Francisco Lyles MD, Family Medicine  Mar 3, 2025    Assessment & Plan     Encounter for Medicare annual wellness exam  Doing well overall. Due for fasting glucose. UTD on lipid screening. Discussed vaccination recommendations.    Prediabetes  - Hemoglobin A1c    Hypertension goal BP (blood pressure) < 140/90  On 160mg valsartan daily. Blood pressure mildly elevated in clinic x2. Repeat at next office visit.   - Basic metabolic panel  (Ca, Cl, CO2, Creat, Gluc, K, Na, BUN)    Pulmonary emphysema, unspecified emphysema type (H)  Chronic bronchitis, unspecified chronic bronchitis type (H)  On triple therapy. CAT score still high (18), despite adding LAMA at last visit. He does endorses significant DUMONT only with uphill walking. Progressively worsening over the past 5 years. No chest pain, palpitations. Continue current medications, see below for DUMONT.     DUMONT  Only when walking with incline. Progressively worsening over past 5 years. Adding COPD medications didn't improve symptoms. No symptoms of chest pressure/pain, palpitations but hx of HTN, prior smoker. Review of recent imaging (CXR) without mention of atherosclerosis. Reflecting on this a little more after patient left office, think would be best to workup with separate office visit to review symptoms. Called patient, set up follow up appt, will likely need EKG and stress testing.    Benign prostatic hyperplasia with nocturia  Well controlled on 0.4mg tamsulosin and 5mg finasteride daily. Not worsening with time. Ok to refill for 1 yr.    The longitudinal plan of care for the diagnosis(es)/condition(s) as documented were addressed during this visit. Due to the added complexity in care, I will continue to support Deepak in the subsequent management and with ongoing continuity of care.    Counseling  Appropriate preventive services were discussed with this patient.  Checklist reviewing preventive services  available has been given to the patient.    Follow up in 2-3 weeks as already scheduled    Jose Francisco Lyles MD  [facility]  3/3/2025      Ayad Sotelo is a 84 year old, presenting for the following:  Medicare Visit        3/3/2025     8:25 AM   Additional Questions   Roomed by MR   Accompanied by GREGOR         3/3/2025     8:25 AM   Patient Reported Additional Medications   Patient reports taking the following new medications NA     HPI     PT IS FASTING    BPH  Taking tamsulosin and finasteride daily.  Going well, not worsening.     HTN  Took the valsartan last night, just before bedtime.   No issues at all.    COPD  Is taking the two different inhalers. Feels this is better than when he was on only one inhaler.  No exacerbations in the last year.   Does notice productive cough, no change.   Endorses SOB when walking up a hill that has been progressively worsening over the past 5 years but no SOB when walking up to 2miles per day. No chest pain, no palpitations.    Walking about 1.5 to 2 miles per day and  is able to get this done within 40 minutes.     Advance Care Planning  Patient has a Health Care Directive on file  Advance care planning document is on file and is current.        3/3/2025   General Health   How would you rate your overall physical health? Good   Feel stress (tense, anxious, or unable to sleep) Not at all         3/3/2025   Nutrition   Diet: Regular (no restrictions)         3/3/2025   Exercise   Days per week of moderate/strenous exercise 7 days   Average minutes spent exercising at this level 40 min         3/3/2025   Social Factors   Frequency of gathering with friends or relatives Once a week   Worry food won't last until get money to buy more No   Food not last or not have enough money for food? No   Do you have housing? (Housing is defined as stable permanent housing and does not include staying ouside in a car, in a tent, in an abandoned building, in an overnight shelter,  or couch-surfing.) Yes   Are you worried about losing your housing? No   Lack of transportation? No   Unable to get utilities (heat,electricity)? No         3/3/2025   Fall Risk   Fallen 2 or more times in the past year? No     No    Trouble with walking or balance? Yes     Yes    Gait Speed Test (Document in seconds) 5   Gait Speed Test Interpretation Less than or equal to 5.00 seconds - PASS       Proxy-reported    Multiple values from one day are sorted in reverse-chronological order   Has always had a balance problem for his whole life. Was never able to walk on the railroad track and balance as a kid. Not any better with age. Maybe a little bit worse. No stumbling at all. Declines PT referral as he doesn't feel this is needed quite yet.        3/3/2025   Activities of Daily Living- Home Safety   Needs help with the following daily activites None of the above   Safety concerns in the home None of the above         3/3/2025   Dental   Dentist two times every year? Yes         3/3/2025   Hearing Screening   Hearing concerns? (!) I FEEL THAT PEOPLE ARE MUMBLING OR NOT SPEAKING CLEARLY.    (!) I NEED TO ASK PEOPLE TO SPEAK UP OR REPEAT THEMSELVES.    (!) IT'S HARD TO FOLLOW A CONVERSATION IN A NOISY RESTAURANT OR CROWDED ROOM.    (!) TROUBLE UNDERSTANDING SOFT OR WHISPERED SPEECH.       Multiple values from one day are sorted in reverse-chronological order         3/3/2025   Driving Risk Screening   Patient/family members have concerns about driving NO          3/3/2025   General Alertness/Fatigue Screening   Have you been more tired than usual lately? No         3/3/2025   Urinary Incontinence Screening   Bothered by leaking urine in past 6 months No         2/28/2024   TB Screening   Were you born outside of the [place]? No     Today's PHQ-2 Score:       3/3/2025     8:23 AM   PHQ-2 ( 1999 [employer])   Q1: Little interest or pleasure in doing things 0    Q2: Feeling down, depressed or hopeless 0    PHQ-2 Score 0    Q1:  Little interest or pleasure in doing things Not at all   Q2: Feeling down, depressed or hopeless Not at all   PHQ-2 Score 0       Proxy-reported           3/3/2025   Substance Use   Alcohol more than 3/day or more than 7/wk No   Do you have a current opioid prescription? No   How severe/bad is pain from 1 to 10? 0/10 (No Pain)   Do you use any other substances recreationally? No     Social History     Tobacco Use    Smoking status: Former     Current packs/day: 0.00     Types: Cigarettes     Quit date: 1980     Years since quittin.2    Smokeless tobacco: Never    Tobacco comments:     Pipie smoker, quit  max 1/2ppd cigarettes since    Vaping Use    Vaping status: Never Used   Substance Use Topics    Alcohol use: Yes     Alcohol/week: 1.7 standard drinks of alcohol     Types: 2 Standard drinks or equivalent per week     Comment: one shot of alcohol daily    Drug use: No     Reviewed and updated as needed this visit by Provider   Tobacco  Allergies  Meds   Med Hx  Surg Hx  Fam Hx            Current providers sharing in care for this patient include:  Patient Care Team:  Clinic - BRUNA Escalera [facility] as PCP - Jose Francisco Chavez MD as Assigned PCP    The following health maintenance items are reviewed in Epic and correct as of today:  Health Maintenance   Topic Date Due    RSV VACCINE (1 - 1-dose 75+ series) Never done    ANNUAL REVIEW OF HM ORDERS  2024    LIPID  2025    MEDICARE ANNUAL WELLNESS VISIT  2025    BMP  2025    COVID-19 Vaccine ( season) 2025    FALL RISK ASSESSMENT  2026    ADVANCE CARE PLANNING  2030    DTAP/TDAP/TD IMMUNIZATION (3 - Td or Tdap) 2034    SPIROMETRY  Completed    COPD ACTION PLAN  Completed    PHQ-2 (once per calendar year)  Completed    INFLUENZA VACCINE  Completed    Pneumococcal Vaccine: 50+ Years  Completed    ZOSTER IMMUNIZATION  Completed    HPV IMMUNIZATION  Aged Out    MENINGITIS  "IMMUNIZATION  Aged Out        Objective    Exam  BP (!) 147/83   Pulse 73   Temp 97.6  F (36.4  C) (Oral)   Resp 14   Ht 1.638 m (5' 4.5\")   Wt 63.9 kg (140 lb 12.8 oz)   SpO2 97%   BMI 23.80 kg/m     Estimated body mass index is 23.8 kg/m  as calculated from the following:    Height as of this encounter: 1.638 m (5' 4.5\").    Weight as of this encounter: 63.9 kg (140 lb 12.8 oz).    Physical Exam  GENERAL: healthy, alert and no distress  HEAD: Normocephalic, atraumatic.   EYES: PERRL. Normal conjunctivae, sclera.   ENT: Normal EAC and TMs bilaterally. Normal oropharynx.   NECK: Supple. No lymphadenopathy appreciated. Trachea midline. Thyroid not enlarged, not TTP.  RESP: lungs clear to auscultation - no rales, rhonchi or wheezes  CV: regular rate and rhythm, normal S1 S2, no murmur, click, rub or gallop.  No peripheral swelling noted.   ABDOMEN: soft, no TTP x4 quadrants. No hepatomegaly or masses appreciated. BS normactive.  MSK: no gross musculoskeletal defects noted.  SKIN: no suspicious lesions or rashes.  EXT: Warm and well perfused. DP pulses 2+ bilaterally.  NEURO: CNII-XII grossly intact. No focal deficits.  PSYCH: Groomed, dressed appropriately for weather.  Logical, linear thought process. Normal mood with consistent affect.            3/3/2025   Mini Cog   Clock Draw Score 2 Normal   3 Item Recall 3 objects recalled   Mini Cog Total Score 5     Signed Electronically by: Jose Francisco Lyles MD      "

## 2025-03-26 ENCOUNTER — OFFICE VISIT (OUTPATIENT)
Dept: FAMILY MEDICINE | Facility: CLINIC | Age: 85
End: 2025-03-26
Payer: COMMERCIAL

## 2025-03-26 VITALS
HEIGHT: 64 IN | OXYGEN SATURATION: 98 % | HEART RATE: 80 BPM | WEIGHT: 142.7 LBS | RESPIRATION RATE: 15 BRPM | BODY MASS INDEX: 24.36 KG/M2 | DIASTOLIC BLOOD PRESSURE: 83 MMHG | SYSTOLIC BLOOD PRESSURE: 125 MMHG

## 2025-03-26 DIAGNOSIS — I10 HYPERTENSION GOAL BP (BLOOD PRESSURE) < 140/90: ICD-10-CM

## 2025-03-26 DIAGNOSIS — R06.09 DOE (DYSPNEA ON EXERTION): Primary | ICD-10-CM

## 2025-03-26 DIAGNOSIS — J42 CHRONIC BRONCHITIS, UNSPECIFIED CHRONIC BRONCHITIS TYPE (H): ICD-10-CM

## 2025-03-26 PROCEDURE — 99213 OFFICE O/P EST LOW 20 MIN: CPT | Performed by: STUDENT IN AN ORGANIZED HEALTH CARE EDUCATION/TRAINING PROGRAM

## 2025-03-26 PROCEDURE — 3079F DIAST BP 80-89 MM HG: CPT | Performed by: STUDENT IN AN ORGANIZED HEALTH CARE EDUCATION/TRAINING PROGRAM

## 2025-03-26 PROCEDURE — 3074F SYST BP LT 130 MM HG: CPT | Performed by: STUDENT IN AN ORGANIZED HEALTH CARE EDUCATION/TRAINING PROGRAM

## 2025-03-26 NOTE — PROGRESS NOTES
Assessment & Plan     DUMONT (dyspnea on exertion)  Chronic bronchitis, unspecified chronic bronchitis type (H)  Clarified with patient that progressive DUMONT up hills WAS significantly improved with addition of LAMA to ICS-LABA. In fact, his overall DUMONT and exertional capacity was much improved after addition.  No cardiac symptoms/signs. Very likely that DUMONT is related to COPD. Continue current medication regimen.    Hypertension goal BP (blood pressure) < 140/90  BP WNL today. Continue current medication - 160mg valsartan daily.    Follow up in one year for annual physical    Jose Francisco Lyles MD  [facility]  3/26/2025    Subjective   Deepak is a 84 year old, presenting for the following health issues:  Follow Up (BP)        3/26/2025     9:12 AM   Additional Questions   Roomed by Anamaria KEYES   Accompanied by self         3/26/2025     9:12 AM   Patient Reported Additional Medications   Patient reports taking the following new medications n/a     History of Present Illness       Hypertension: He presents for follow up of hypertension.  He does not check blood pressure  regularly outside of the clinic. Outside blood pressures have been over 140/90. He follows a low salt diet.     He eats 2-3 servings of fruits and vegetables daily.He consumes 0 sweetened beverage(s) daily.He exercises with enough effort to increase his heart rate 30 to 60 minutes per day.  He exercises with enough effort to increase his heart rate 5 days per week.   He is taking medications regularly.        DUMONT  Discussed reason for return visit today.   He states there may have been miscommunication or misunderstanding at last visit.  He feels there WAS significant improvement after adding the Spiriva to his Dulera in symptoms last year.   He feels his exertional capacity is much better in both walking and climbing hills after adding Spiriva.  No chest pain/pressure, palpitations, LE swelling.   Does endorse AM productive cough but this also  "improved after adding Spiriva.   Walks about 1.5 to 2 miles per day.           Objective    /83 (BP Location: Right arm, Patient Position: Sitting, Cuff Size: Adult Regular)   Pulse 80   Resp 15   Ht 1.626 m (5' 4\")   Wt 64.7 kg (142 lb 11.2 oz)   SpO2 98%   BMI 24.49 kg/m    Body mass index is 24.49 kg/m .    Physical Exam   GENERAL: healthy, alert and no distress  HEAD: Normocephalic, atraumatic.   EYES: Normal conjunctivae, sclera.   RESP: lungs clear to auscultation - no rales, rhonchi or wheezes  CV: regular rate and rhythm, normal S1 S2, no murmur, click, rub or gallop. No peripheral edema bilaterally.   MSK: no gross musculoskeletal defects noted.  SKIN: no suspicious lesions or rashes.  NEURO: CNII-XII grossly intact. No focal deficits.  PSYCH: Groomed, dressed appropriately for weather. Normal mood with consistent affect.     Signed Electronically by: Jose Francisco Lyles MD    "

## 2025-08-25 ENCOUNTER — TRANSFERRED RECORDS (OUTPATIENT)
Dept: HEALTH INFORMATION MANAGEMENT | Facility: CLINIC | Age: 85
End: 2025-08-25
Payer: COMMERCIAL